# Patient Record
Sex: MALE | Race: WHITE | NOT HISPANIC OR LATINO | Employment: OTHER | URBAN - METROPOLITAN AREA
[De-identification: names, ages, dates, MRNs, and addresses within clinical notes are randomized per-mention and may not be internally consistent; named-entity substitution may affect disease eponyms.]

---

## 2017-01-10 RX ORDER — FLUTICASONE PROPIONATE 50 MCG
SPRAY, SUSPENSION (ML) NASAL
Qty: 16 G | Refills: 3 | Status: SHIPPED | OUTPATIENT
Start: 2017-01-10

## 2017-03-09 RX ORDER — BENAZEPRIL HYDROCHLORIDE 20 MG/1
TABLET ORAL
Qty: 30 TABLET | Refills: 0 | OUTPATIENT
Start: 2017-03-09

## 2017-03-14 RX ORDER — BENAZEPRIL HYDROCHLORIDE 20 MG/1
TABLET ORAL
Qty: 30 TABLET | Refills: 0 | OUTPATIENT
Start: 2017-03-14

## 2020-11-03 ENCOUNTER — OFFICE VISIT (OUTPATIENT)
Dept: SURGERY | Facility: CLINIC | Age: 75
End: 2020-11-03
Payer: COMMERCIAL

## 2020-11-03 VITALS
TEMPERATURE: 97 F | WEIGHT: 219.38 LBS | HEIGHT: 69 IN | HEART RATE: 73 BPM | SYSTOLIC BLOOD PRESSURE: 132 MMHG | BODY MASS INDEX: 32.49 KG/M2 | DIASTOLIC BLOOD PRESSURE: 79 MMHG

## 2020-11-03 DIAGNOSIS — K64.1 GRADE II HEMORRHOIDS: Primary | ICD-10-CM

## 2020-11-03 PROCEDURE — 1126F AMNT PAIN NOTED NONE PRSNT: CPT | Mod: S$GLB,,, | Performed by: SURGERY

## 2020-11-03 PROCEDURE — 3075F SYST BP GE 130 - 139MM HG: CPT | Mod: CPTII,S$GLB,, | Performed by: SURGERY

## 2020-11-03 PROCEDURE — 3078F PR MOST RECENT DIASTOLIC BLOOD PRESSURE < 80 MM HG: ICD-10-PCS | Mod: CPTII,S$GLB,, | Performed by: SURGERY

## 2020-11-03 PROCEDURE — 3008F BODY MASS INDEX DOCD: CPT | Mod: CPTII,S$GLB,, | Performed by: SURGERY

## 2020-11-03 PROCEDURE — 46600 DIAGNOSTIC ANOSCOPY SPX: CPT | Mod: S$GLB,,, | Performed by: SURGERY

## 2020-11-03 PROCEDURE — 1101F PT FALLS ASSESS-DOCD LE1/YR: CPT | Mod: CPTII,S$GLB,, | Performed by: SURGERY

## 2020-11-03 PROCEDURE — 1159F MED LIST DOCD IN RCRD: CPT | Mod: S$GLB,,, | Performed by: SURGERY

## 2020-11-03 PROCEDURE — 99999 PR PBB SHADOW E&M-NEW PATIENT-LVL III: CPT | Mod: PBBFAC,,, | Performed by: SURGERY

## 2020-11-03 PROCEDURE — 99203 PR OFFICE/OUTPT VISIT, NEW, LEVL III, 30-44 MIN: ICD-10-PCS | Mod: 25,S$GLB,, | Performed by: SURGERY

## 2020-11-03 PROCEDURE — 1126F PR PAIN SEVERITY QUANTIFIED, NO PAIN PRESENT: ICD-10-PCS | Mod: S$GLB,,, | Performed by: SURGERY

## 2020-11-03 PROCEDURE — 99203 OFFICE O/P NEW LOW 30 MIN: CPT | Mod: 25,S$GLB,, | Performed by: SURGERY

## 2020-11-03 PROCEDURE — 3078F DIAST BP <80 MM HG: CPT | Mod: CPTII,S$GLB,, | Performed by: SURGERY

## 2020-11-03 PROCEDURE — 99999 PR PBB SHADOW E&M-NEW PATIENT-LVL III: ICD-10-PCS | Mod: PBBFAC,,, | Performed by: SURGERY

## 2020-11-03 PROCEDURE — 1159F PR MEDICATION LIST DOCUMENTED IN MEDICAL RECORD: ICD-10-PCS | Mod: S$GLB,,, | Performed by: SURGERY

## 2020-11-03 PROCEDURE — 46600 PR DIAG2STIC A2SCOPY: ICD-10-PCS | Mod: S$GLB,,, | Performed by: SURGERY

## 2020-11-03 PROCEDURE — 3008F PR BODY MASS INDEX (BMI) DOCUMENTED: ICD-10-PCS | Mod: CPTII,S$GLB,, | Performed by: SURGERY

## 2020-11-03 PROCEDURE — 3075F PR MOST RECENT SYSTOLIC BLOOD PRESS GE 130-139MM HG: ICD-10-PCS | Mod: CPTII,S$GLB,, | Performed by: SURGERY

## 2020-11-03 PROCEDURE — 1101F PR PT FALLS ASSESS DOC 0-1 FALLS W/OUT INJ PAST YR: ICD-10-PCS | Mod: CPTII,S$GLB,, | Performed by: SURGERY

## 2020-11-03 RX ORDER — A/SINGAPORE/GP1908/2015 IVR-180 (AN A/MICHIGAN/45/2015 (H1N1)PDM09-LIKE VIRUS, A/HONG KONG/4801/2014, NYMC X-263B (H3N2) (AN A/HONG KONG/4801/2014-LIKE VIRUS), AND B/BRISBANE/60/2008, WILD TYPE (A B/BRISBANE/60/2008-LIKE VIRUS) 15; 15; 15 UG/.5ML; UG/.5ML; UG/.5ML
INJECTION, SUSPENSION INTRAMUSCULAR
COMMUNITY
Start: 2020-10-08 | End: 2021-01-01

## 2020-11-03 RX ORDER — AMLODIPINE AND BENAZEPRIL HYDROCHLORIDE 5; 40 MG/1; MG/1
1 CAPSULE ORAL DAILY
Status: ON HOLD | COMMUNITY
End: 2022-01-01 | Stop reason: HOSPADM

## 2020-11-03 RX ORDER — TESTOSTERONE CYPIONATE 200 MG/ML
200 INJECTION, SOLUTION INTRAMUSCULAR
COMMUNITY
End: 2021-01-01

## 2020-11-03 RX ORDER — HYDROCORTISONE ACETATE 25 MG/1
25 SUPPOSITORY RECTAL 2 TIMES DAILY PRN
Status: ON HOLD | COMMUNITY
Start: 2020-10-21 | End: 2022-01-01 | Stop reason: HOSPADM

## 2020-11-03 RX ORDER — MULTIVIT WITH IRON,MINERALS
1 TABLET,CHEWABLE ORAL DAILY
Status: ON HOLD | COMMUNITY
End: 2022-01-01 | Stop reason: HOSPADM

## 2020-11-03 NOTE — PROGRESS NOTES
Subjective:       Patient ID: Kalen Evans is a 75 y.o. male.    Chief Complaint: Consult (Hemorrhoids)    HPI   this is a pleasant 75 male who was referred to me for evaluation of his hemorrhoids.  Patient notes he has been having difficulty with his hemorrhoids.  He noted that he had a swollen mass internally that was prolapsing her falling out.  He stated this made bowel movements difficulty.  He states over the last 4-5 days however this has improved.  He notes he can hardly notice that anymore.  He denies any bleeding.  He denies any changes in bowel habits.  He does report he had a colonoscopy in the last 2-3 years.  He has no other complaints.  He does take fiber daily.  His past medical history significant for coronary artery disease.  He has had multiple abdominal surgeries.  Review of Systems   Constitutional: Negative for activity change, appetite change, diaphoresis, fever and unexpected weight change.   Respiratory: Negative for cough, chest tightness and wheezing.    Cardiovascular: Negative for chest pain and palpitations.   Gastrointestinal: Negative for abdominal distention, abdominal pain, anal bleeding, blood in stool, constipation, diarrhea, nausea, rectal pain and vomiting.   Genitourinary: Negative for difficulty urinating, dysuria and hematuria.   Musculoskeletal: Negative for back pain and gait problem.   Neurological: Negative for tremors and seizures.         Objective:      Physical Exam  Vitals signs reviewed.   Constitutional:       Appearance: He is well-developed.   HENT:      Head: Normocephalic and atraumatic.   Eyes:      Pupils: Pupils are equal, round, and reactive to light.   Neck:      Musculoskeletal: Normal range of motion.      Thyroid: No thyromegaly.      Trachea: No tracheal deviation.   Cardiovascular:      Rate and Rhythm: Normal rate and regular rhythm.      Heart sounds: Normal heart sounds. No murmur. No friction rub. No gallop.    Pulmonary:      Effort: Pulmonary  effort is normal.   Abdominal:      General: There is no distension.      Palpations: There is no mass.      Tenderness: There is no abdominal tenderness. There is no guarding or rebound.   Genitourinary:     Comments: External exam does demonstrate a hemorrhoidal tag in the left anterior lateral position.  Digital rectal exam with normal sphincter tone.  Anoscopy was done demonstrating slight to moderate internal hemorrhoids in the right anterior, right posterior and left lateral columns.  Musculoskeletal: Normal range of motion.   Skin:     General: Skin is warm.   Neurological:      Mental Status: He is alert and oriented to person, place, and time.      Coordination: Coordination normal.         Assessment:     hemorrhoids  No diagnosis found.    Plan:       D/w pt.  I have recommended increasing fiber in male diet and to also begin using a fiber supplement (metamucil or psyillium husk).  Will monitor for improvement and if no significant improvement will consider more aggressive treatment.

## 2021-01-01 ENCOUNTER — HOSPITAL ENCOUNTER (OUTPATIENT)
Dept: RADIOLOGY | Facility: HOSPITAL | Age: 76
Discharge: HOME | End: 2021-11-11
Attending: INTERNAL MEDICINE
Payer: MEDICARE

## 2021-01-01 ENCOUNTER — SOCIAL WORK (OUTPATIENT)
Dept: HEMATOLOGY/ONCOLOGY | Facility: CLINIC | Age: 76
End: 2021-01-01

## 2021-01-01 ENCOUNTER — HOSPITAL ENCOUNTER (OUTPATIENT)
Dept: RADIOLOGY | Facility: HOSPITAL | Age: 76
Discharge: HOME | End: 2021-09-23
Attending: INTERNAL MEDICINE
Payer: MEDICARE

## 2021-01-01 ENCOUNTER — INFUSION (OUTPATIENT)
Dept: INFUSION THERAPY | Facility: HOSPITAL | Age: 76
End: 2021-01-01
Attending: INTERNAL MEDICINE
Payer: MEDICARE

## 2021-01-01 ENCOUNTER — TELEPHONE (OUTPATIENT)
Dept: HEMATOLOGY/ONCOLOGY | Facility: CLINIC | Age: 76
End: 2021-01-01

## 2021-01-01 ENCOUNTER — HOSPITAL ENCOUNTER (OUTPATIENT)
Dept: RADIOLOGY | Facility: HOSPITAL | Age: 76
Discharge: HOME | End: 2021-09-21
Attending: INTERNAL MEDICINE
Payer: MEDICARE

## 2021-01-01 ENCOUNTER — TELEPHONE (OUTPATIENT)
Dept: HEMATOLOGY/ONCOLOGY | Facility: HOSPITAL | Age: 76
End: 2021-01-01
Payer: MEDICARE

## 2021-01-01 ENCOUNTER — OFFICE VISIT (OUTPATIENT)
Dept: HEMATOLOGY/ONCOLOGY | Facility: CLINIC | Age: 76
End: 2021-01-01
Payer: MEDICARE

## 2021-01-01 ENCOUNTER — TELEPHONE (OUTPATIENT)
Dept: HEMATOLOGY/ONCOLOGY | Facility: CLINIC | Age: 76
End: 2021-01-01
Payer: MEDICARE

## 2021-01-01 ENCOUNTER — OFFICE VISIT (OUTPATIENT)
Dept: RADIATION ONCOLOGY | Facility: CLINIC | Age: 76
End: 2021-01-01
Payer: MEDICARE

## 2021-01-01 ENCOUNTER — HOSPITAL ENCOUNTER (OUTPATIENT)
Dept: RADIOLOGY | Facility: HOSPITAL | Age: 76
Discharge: HOME | End: 2021-12-29
Attending: INTERNAL MEDICINE
Payer: MEDICARE

## 2021-01-01 ENCOUNTER — HOSPITAL ENCOUNTER (OUTPATIENT)
Dept: RADIOLOGY | Facility: HOSPITAL | Age: 76
Discharge: HOME | End: 2021-09-10
Attending: INTERNAL MEDICINE
Payer: MEDICARE

## 2021-01-01 ENCOUNTER — LAB VISIT (OUTPATIENT)
Dept: LAB | Facility: HOSPITAL | Age: 76
End: 2021-01-01
Attending: INTERNAL MEDICINE
Payer: MEDICARE

## 2021-01-01 ENCOUNTER — TELEPHONE (OUTPATIENT)
Dept: PULMONOLOGY | Facility: CLINIC | Age: 76
End: 2021-01-01

## 2021-01-01 ENCOUNTER — DOCUMENTATION ONLY (OUTPATIENT)
Dept: HEMATOLOGY/ONCOLOGY | Facility: CLINIC | Age: 76
End: 2021-01-01

## 2021-01-01 ENCOUNTER — TELEPHONE (OUTPATIENT)
Dept: INFUSION THERAPY | Facility: HOSPITAL | Age: 76
End: 2021-01-01

## 2021-01-01 ENCOUNTER — HOSPITAL ENCOUNTER (OUTPATIENT)
Dept: RADIOLOGY | Facility: HOSPITAL | Age: 76
Discharge: HOME | End: 2021-12-01
Attending: INTERNAL MEDICINE
Payer: MEDICARE

## 2021-01-01 ENCOUNTER — TELEPHONE (OUTPATIENT)
Dept: RADIOLOGY | Facility: HOSPITAL | Age: 76
End: 2021-01-01

## 2021-01-01 VITALS
HEIGHT: 69 IN | DIASTOLIC BLOOD PRESSURE: 54 MMHG | RESPIRATION RATE: 16 BRPM | HEART RATE: 57 BPM | BODY MASS INDEX: 28.57 KG/M2 | BODY MASS INDEX: 29.31 KG/M2 | RESPIRATION RATE: 16 BRPM | WEIGHT: 191.13 LBS | SYSTOLIC BLOOD PRESSURE: 113 MMHG | WEIGHT: 192.88 LBS | TEMPERATURE: 98 F | SYSTOLIC BLOOD PRESSURE: 117 MMHG | HEIGHT: 69 IN | BODY MASS INDEX: 28.31 KG/M2 | WEIGHT: 197.88 LBS | DIASTOLIC BLOOD PRESSURE: 65 MMHG | DIASTOLIC BLOOD PRESSURE: 62 MMHG | TEMPERATURE: 97 F | HEART RATE: 83 BPM | HEART RATE: 59 BPM | RESPIRATION RATE: 16 BRPM | HEIGHT: 69 IN | TEMPERATURE: 98 F | SYSTOLIC BLOOD PRESSURE: 138 MMHG | OXYGEN SATURATION: 97 %

## 2021-01-01 VITALS
OXYGEN SATURATION: 95 % | SYSTOLIC BLOOD PRESSURE: 133 MMHG | TEMPERATURE: 97 F | DIASTOLIC BLOOD PRESSURE: 63 MMHG | WEIGHT: 194.81 LBS | HEART RATE: 73 BPM | BODY MASS INDEX: 28.77 KG/M2 | RESPIRATION RATE: 18 BRPM

## 2021-01-01 VITALS
WEIGHT: 189.69 LBS | TEMPERATURE: 97 F | HEIGHT: 69 IN | HEART RATE: 62 BPM | BODY MASS INDEX: 28.09 KG/M2 | SYSTOLIC BLOOD PRESSURE: 129 MMHG | DIASTOLIC BLOOD PRESSURE: 72 MMHG | RESPIRATION RATE: 18 BRPM

## 2021-01-01 VITALS
BODY MASS INDEX: 27.91 KG/M2 | SYSTOLIC BLOOD PRESSURE: 132 MMHG | DIASTOLIC BLOOD PRESSURE: 68 MMHG | TEMPERATURE: 98 F | RESPIRATION RATE: 18 BRPM | WEIGHT: 189 LBS | OXYGEN SATURATION: 95 % | HEART RATE: 85 BPM

## 2021-01-01 VITALS
TEMPERATURE: 98 F | HEART RATE: 57 BPM | DIASTOLIC BLOOD PRESSURE: 57 MMHG | DIASTOLIC BLOOD PRESSURE: 63 MMHG | HEART RATE: 70 BPM | BODY MASS INDEX: 28.16 KG/M2 | TEMPERATURE: 97 F | WEIGHT: 190.13 LBS | OXYGEN SATURATION: 95 % | BODY MASS INDEX: 28.51 KG/M2 | RESPIRATION RATE: 18 BRPM | WEIGHT: 192.5 LBS | SYSTOLIC BLOOD PRESSURE: 104 MMHG | SYSTOLIC BLOOD PRESSURE: 113 MMHG | RESPIRATION RATE: 18 BRPM | OXYGEN SATURATION: 96 % | HEIGHT: 69 IN | HEIGHT: 69 IN

## 2021-01-01 VITALS
HEIGHT: 69 IN | HEART RATE: 85 BPM | DIASTOLIC BLOOD PRESSURE: 78 MMHG | BODY MASS INDEX: 27.4 KG/M2 | WEIGHT: 185 LBS | RESPIRATION RATE: 18 BRPM | SYSTOLIC BLOOD PRESSURE: 145 MMHG

## 2021-01-01 VITALS
DIASTOLIC BLOOD PRESSURE: 79 MMHG | BODY MASS INDEX: 28.21 KG/M2 | WEIGHT: 191 LBS | SYSTOLIC BLOOD PRESSURE: 158 MMHG | OXYGEN SATURATION: 95 % | RESPIRATION RATE: 18 BRPM | HEART RATE: 69 BPM | HEIGHT: 69 IN | DIASTOLIC BLOOD PRESSURE: 82 MMHG | TEMPERATURE: 98 F | SYSTOLIC BLOOD PRESSURE: 147 MMHG | HEART RATE: 64 BPM | WEIGHT: 191 LBS | BODY MASS INDEX: 28.29 KG/M2

## 2021-01-01 VITALS
RESPIRATION RATE: 18 BRPM | HEIGHT: 69 IN | HEART RATE: 86 BPM | OXYGEN SATURATION: 97 % | BODY MASS INDEX: 28.29 KG/M2 | DIASTOLIC BLOOD PRESSURE: 66 MMHG | WEIGHT: 191 LBS | TEMPERATURE: 98 F | SYSTOLIC BLOOD PRESSURE: 128 MMHG

## 2021-01-01 VITALS
RESPIRATION RATE: 16 BRPM | WEIGHT: 196.19 LBS | TEMPERATURE: 98 F | DIASTOLIC BLOOD PRESSURE: 71 MMHG | BODY MASS INDEX: 29.06 KG/M2 | HEART RATE: 64 BPM | HEIGHT: 69 IN | SYSTOLIC BLOOD PRESSURE: 149 MMHG | OXYGEN SATURATION: 97 %

## 2021-01-01 VITALS
RESPIRATION RATE: 18 BRPM | SYSTOLIC BLOOD PRESSURE: 142 MMHG | HEIGHT: 69 IN | BODY MASS INDEX: 28.44 KG/M2 | OXYGEN SATURATION: 96 % | HEIGHT: 69 IN | HEART RATE: 71 BPM | HEART RATE: 62 BPM | SYSTOLIC BLOOD PRESSURE: 132 MMHG | WEIGHT: 191.13 LBS | HEIGHT: 69 IN | DIASTOLIC BLOOD PRESSURE: 62 MMHG | BODY MASS INDEX: 28.31 KG/M2 | BODY MASS INDEX: 28.88 KG/M2 | RESPIRATION RATE: 16 BRPM | TEMPERATURE: 97 F | WEIGHT: 192 LBS | DIASTOLIC BLOOD PRESSURE: 75 MMHG | WEIGHT: 195 LBS

## 2021-01-01 VITALS
BODY MASS INDEX: 27.85 KG/M2 | SYSTOLIC BLOOD PRESSURE: 119 MMHG | OXYGEN SATURATION: 95 % | HEIGHT: 69 IN | HEART RATE: 70 BPM | RESPIRATION RATE: 18 BRPM | WEIGHT: 186.81 LBS | SYSTOLIC BLOOD PRESSURE: 126 MMHG | DIASTOLIC BLOOD PRESSURE: 72 MMHG | HEIGHT: 69 IN | RESPIRATION RATE: 17 BRPM | TEMPERATURE: 98 F | HEART RATE: 61 BPM | WEIGHT: 188 LBS | DIASTOLIC BLOOD PRESSURE: 78 MMHG | BODY MASS INDEX: 27.67 KG/M2

## 2021-01-01 VITALS
BODY MASS INDEX: 28.01 KG/M2 | TEMPERATURE: 97 F | RESPIRATION RATE: 18 BRPM | DIASTOLIC BLOOD PRESSURE: 78 MMHG | WEIGHT: 189.13 LBS | HEART RATE: 68 BPM | SYSTOLIC BLOOD PRESSURE: 148 MMHG | HEIGHT: 69 IN | OXYGEN SATURATION: 95 %

## 2021-01-01 VITALS
DIASTOLIC BLOOD PRESSURE: 77 MMHG | RESPIRATION RATE: 18 BRPM | SYSTOLIC BLOOD PRESSURE: 151 MMHG | BODY MASS INDEX: 29.33 KG/M2 | HEART RATE: 75 BPM | WEIGHT: 198 LBS | HEIGHT: 69 IN

## 2021-01-01 VITALS
DIASTOLIC BLOOD PRESSURE: 80 MMHG | WEIGHT: 189 LBS | SYSTOLIC BLOOD PRESSURE: 155 MMHG | HEART RATE: 67 BPM | BODY MASS INDEX: 27.91 KG/M2

## 2021-01-01 VITALS
WEIGHT: 193.63 LBS | SYSTOLIC BLOOD PRESSURE: 149 MMHG | TEMPERATURE: 98 F | RESPIRATION RATE: 17 BRPM | BODY MASS INDEX: 28.59 KG/M2 | HEART RATE: 72 BPM | DIASTOLIC BLOOD PRESSURE: 78 MMHG

## 2021-01-01 VITALS
BODY MASS INDEX: 27.91 KG/M2 | HEART RATE: 80 BPM | DIASTOLIC BLOOD PRESSURE: 69 MMHG | WEIGHT: 189 LBS | TEMPERATURE: 98 F | SYSTOLIC BLOOD PRESSURE: 133 MMHG

## 2021-01-01 VITALS
HEIGHT: 69 IN | DIASTOLIC BLOOD PRESSURE: 73 MMHG | BODY MASS INDEX: 27.8 KG/M2 | TEMPERATURE: 98 F | SYSTOLIC BLOOD PRESSURE: 127 MMHG | RESPIRATION RATE: 16 BRPM | RESPIRATION RATE: 16 BRPM | HEIGHT: 69 IN | DIASTOLIC BLOOD PRESSURE: 64 MMHG | HEART RATE: 78 BPM | WEIGHT: 194.13 LBS | WEIGHT: 187.69 LBS | TEMPERATURE: 98 F | BODY MASS INDEX: 28.75 KG/M2 | HEART RATE: 75 BPM | SYSTOLIC BLOOD PRESSURE: 143 MMHG

## 2021-01-01 VITALS
WEIGHT: 187 LBS | TEMPERATURE: 99 F | HEART RATE: 66 BPM | BODY MASS INDEX: 27.62 KG/M2 | DIASTOLIC BLOOD PRESSURE: 81 MMHG | SYSTOLIC BLOOD PRESSURE: 151 MMHG

## 2021-01-01 VITALS
RESPIRATION RATE: 18 BRPM | DIASTOLIC BLOOD PRESSURE: 84 MMHG | WEIGHT: 197 LBS | SYSTOLIC BLOOD PRESSURE: 179 MMHG | HEART RATE: 69 BPM | BODY MASS INDEX: 29.18 KG/M2 | HEIGHT: 69 IN

## 2021-01-01 VITALS
TEMPERATURE: 98 F | WEIGHT: 186 LBS | HEART RATE: 78 BPM | RESPIRATION RATE: 18 BRPM | HEIGHT: 69 IN | OXYGEN SATURATION: 95 % | DIASTOLIC BLOOD PRESSURE: 67 MMHG | BODY MASS INDEX: 27.55 KG/M2 | SYSTOLIC BLOOD PRESSURE: 144 MMHG

## 2021-01-01 DIAGNOSIS — T45.1X5A CHEMOTHERAPY INDUCED NEUTROPENIA: ICD-10-CM

## 2021-01-01 DIAGNOSIS — C7A.8 NEUROENDOCRINE CARCINOMA OF LUNG: ICD-10-CM

## 2021-01-01 DIAGNOSIS — C34.90 PRIMARY MALIGNANT NEOPLASM OF LUNG, UNSPECIFIED LATERALITY: Primary | ICD-10-CM

## 2021-01-01 DIAGNOSIS — Z03.818 ENCOUNTER FOR OBSERVATION FOR SUSPECTED EXPOSURE TO OTHER BIOLOGICAL AGENTS RULED OUT: Primary | ICD-10-CM

## 2021-01-01 DIAGNOSIS — C34.90 PRIMARY MALIGNANT NEOPLASM OF LUNG, UNSPECIFIED LATERALITY: ICD-10-CM

## 2021-01-01 DIAGNOSIS — C7A.8 NEUROENDOCRINE CARCINOMA OF LUNG: Primary | ICD-10-CM

## 2021-01-01 DIAGNOSIS — C38.1 MALIGNANT NEOPLASM OF ANTERIOR MEDIASTINUM: ICD-10-CM

## 2021-01-01 DIAGNOSIS — I10 ESSENTIAL HYPERTENSION: ICD-10-CM

## 2021-01-01 DIAGNOSIS — J98.59 MEDIASTINAL MASS: Primary | ICD-10-CM

## 2021-01-01 DIAGNOSIS — E03.2 HYPOTHYROIDISM DUE TO MEDICATION: Primary | ICD-10-CM

## 2021-01-01 DIAGNOSIS — D70.1 CHEMOTHERAPY INDUCED NEUTROPENIA: ICD-10-CM

## 2021-01-01 DIAGNOSIS — K86.3 PSEUDOCYST OF PANCREAS: ICD-10-CM

## 2021-01-01 DIAGNOSIS — F17.200 CURRENT EVERY DAY SMOKER: ICD-10-CM

## 2021-01-01 DIAGNOSIS — C7A.8 NEUROENDOCRINE CARCINOMA METASTATIC TO LUNG: Primary | ICD-10-CM

## 2021-01-01 DIAGNOSIS — Z95.1 S/P CABG X 4: ICD-10-CM

## 2021-01-01 DIAGNOSIS — R91.8 PULMONARY NODULES/LESIONS, MULTIPLE: ICD-10-CM

## 2021-01-01 DIAGNOSIS — C7B.8 NEUROENDOCRINE CARCINOMA METASTATIC TO LUNG: Primary | ICD-10-CM

## 2021-01-01 DIAGNOSIS — R91.1 PULMONARY NODULE: Primary | ICD-10-CM

## 2021-01-01 DIAGNOSIS — R91.1 PULMONARY NODULE: ICD-10-CM

## 2021-01-01 DIAGNOSIS — D70.1 CHEMOTHERAPY INDUCED NEUTROPENIA: Primary | ICD-10-CM

## 2021-01-01 DIAGNOSIS — C7B.8 NEUROENDOCRINE CARCINOMA METASTATIC TO LUNG: ICD-10-CM

## 2021-01-01 DIAGNOSIS — E03.2 HYPOTHYROIDISM DUE TO MEDICATION: ICD-10-CM

## 2021-01-01 DIAGNOSIS — R19.7 DIARRHEA DUE TO MALABSORPTION: ICD-10-CM

## 2021-01-01 DIAGNOSIS — J98.59 MEDIASTINAL MASS: ICD-10-CM

## 2021-01-01 DIAGNOSIS — K90.9 DIARRHEA DUE TO MALABSORPTION: ICD-10-CM

## 2021-01-01 DIAGNOSIS — T45.1X5A CHEMOTHERAPY INDUCED NEUTROPENIA: Primary | ICD-10-CM

## 2021-01-01 DIAGNOSIS — C7A.8 NEUROENDOCRINE CARCINOMA METASTATIC TO LUNG: ICD-10-CM

## 2021-01-01 DIAGNOSIS — K86.89 SECONDARY PANCREATIC INSUFFICIENCY: ICD-10-CM

## 2021-01-01 LAB
ALBUMIN SERPL BCP-MCNC: 3.2 G/DL (ref 3.5–5.2)
ALBUMIN SERPL BCP-MCNC: 3.4 G/DL (ref 3.5–5.2)
ALBUMIN SERPL BCP-MCNC: 3.5 G/DL (ref 3.5–5.2)
ALBUMIN SERPL BCP-MCNC: 3.6 G/DL (ref 3.5–5.2)
ALBUMIN SERPL BCP-MCNC: 3.7 G/DL (ref 3.5–5.2)
ALBUMIN SERPL BCP-MCNC: 3.7 G/DL (ref 3.5–5.2)
ALP SERPL-CCNC: 108 U/L (ref 55–135)
ALP SERPL-CCNC: 120 U/L (ref 55–135)
ALP SERPL-CCNC: 153 U/L (ref 55–135)
ALP SERPL-CCNC: 74 U/L (ref 55–135)
ALP SERPL-CCNC: 74 U/L (ref 55–135)
ALP SERPL-CCNC: 95 U/L (ref 55–135)
ALT SERPL W/O P-5'-P-CCNC: 13 U/L (ref 10–44)
ALT SERPL W/O P-5'-P-CCNC: 16 U/L (ref 10–44)
ALT SERPL W/O P-5'-P-CCNC: 17 U/L (ref 10–44)
ALT SERPL W/O P-5'-P-CCNC: 18 U/L (ref 10–44)
ANION GAP SERPL CALC-SCNC: 11 MMOL/L (ref 8–16)
ANION GAP SERPL CALC-SCNC: 12 MMOL/L (ref 8–16)
ANION GAP SERPL CALC-SCNC: 13 MMOL/L (ref 8–16)
ANION GAP SERPL CALC-SCNC: 8 MMOL/L (ref 8–16)
ANION GAP SERPL CALC-SCNC: 8 MMOL/L (ref 8–16)
ANION GAP SERPL CALC-SCNC: 9 MMOL/L (ref 8–16)
ANISOCYTOSIS BLD QL SMEAR: SLIGHT
APTT PPP: 33.7 SEC (ref 25.6–35.8)
AST SERPL-CCNC: 16 U/L (ref 10–40)
AST SERPL-CCNC: 19 U/L (ref 10–40)
AST SERPL-CCNC: 21 U/L (ref 10–40)
AST SERPL-CCNC: 22 U/L (ref 10–40)
AST SERPL-CCNC: 28 U/L (ref 10–40)
AST SERPL-CCNC: 28 U/L (ref 10–40)
BASOPHILS # BLD AUTO: 0.03 K/UL (ref 0–0.2)
BASOPHILS # BLD AUTO: 0.04 K/UL (ref 0–0.2)
BASOPHILS # BLD AUTO: 0.06 K/UL (ref 0–0.2)
BASOPHILS # BLD AUTO: 0.07 K/UL (ref 0–0.2)
BASOPHILS # BLD AUTO: 0.07 K/UL (ref 0–0.2)
BASOPHILS NFR BLD: 0 % (ref 0–1.9)
BASOPHILS NFR BLD: 0 % (ref 0–1.9)
BASOPHILS NFR BLD: 0.3 % (ref 0–1.9)
BASOPHILS NFR BLD: 0.4 % (ref 0–1.9)
BASOPHILS NFR BLD: 0.7 % (ref 0–1.9)
BASOPHILS NFR BLD: 0.7 % (ref 0–1.9)
BASOPHILS NFR BLD: 1 % (ref 0–1.9)
BILIRUB SERPL-MCNC: 0.6 MG/DL (ref 0.1–1)
BILIRUB SERPL-MCNC: 0.8 MG/DL (ref 0.1–1)
BILIRUB SERPL-MCNC: 0.9 MG/DL (ref 0.1–1)
BILIRUB SERPL-MCNC: 1 MG/DL (ref 0.1–1)
BUN SERPL-MCNC: 13 MG/DL (ref 8–23)
BUN SERPL-MCNC: 15 MG/DL (ref 8–23)
BUN SERPL-MCNC: 17 MG/DL (ref 8–23)
BUN SERPL-MCNC: 8 MG/DL (ref 8–23)
CALCIUM SERPL-MCNC: 9 MG/DL (ref 8.7–10.5)
CALCIUM SERPL-MCNC: 9.3 MG/DL (ref 8.7–10.5)
CALCIUM SERPL-MCNC: 9.4 MG/DL (ref 8.7–10.5)
CALCIUM SERPL-MCNC: 9.4 MG/DL (ref 8.7–10.5)
CALCIUM SERPL-MCNC: 9.9 MG/DL (ref 8.7–10.5)
CALCIUM SERPL-MCNC: 9.9 MG/DL (ref 8.7–10.5)
CHLORIDE SERPL-SCNC: 101 MMOL/L (ref 95–110)
CHLORIDE SERPL-SCNC: 102 MMOL/L (ref 95–110)
CHLORIDE SERPL-SCNC: 102 MMOL/L (ref 95–110)
CHLORIDE SERPL-SCNC: 98 MMOL/L (ref 95–110)
CO2 SERPL-SCNC: 22 MMOL/L (ref 23–29)
CO2 SERPL-SCNC: 25 MMOL/L (ref 23–29)
CREAT SERPL-MCNC: 0.7 MG/DL (ref 0.5–1.4)
CREAT SERPL-MCNC: 0.7 MG/DL (ref 0.5–1.4)
CREAT SERPL-MCNC: 0.8 MG/DL (ref 0.5–1.4)
CREAT SERPL-MCNC: 0.9 MG/DL (ref 0.5–1.4)
DIFFERENTIAL METHOD: ABNORMAL
EOSINOPHIL # BLD AUTO: 0 K/UL (ref 0–0.5)
EOSINOPHIL # BLD AUTO: 0 K/UL (ref 0–0.5)
EOSINOPHIL # BLD AUTO: 0.1 K/UL (ref 0–0.5)
EOSINOPHIL # BLD AUTO: 0.1 K/UL (ref 0–0.5)
EOSINOPHIL # BLD AUTO: 0.2 K/UL (ref 0–0.5)
EOSINOPHIL NFR BLD: 0 % (ref 0–8)
EOSINOPHIL NFR BLD: 0 % (ref 0–8)
EOSINOPHIL NFR BLD: 0.2 % (ref 0–8)
EOSINOPHIL NFR BLD: 0.3 % (ref 0–8)
EOSINOPHIL NFR BLD: 1.3 % (ref 0–8)
EOSINOPHIL NFR BLD: 1.3 % (ref 0–8)
EOSINOPHIL NFR BLD: 2.4 % (ref 0–8)
ERYTHROCYTE [DISTWIDTH] IN BLOOD BY AUTOMATED COUNT: 13.6 % (ref 11.5–14.5)
ERYTHROCYTE [DISTWIDTH] IN BLOOD BY AUTOMATED COUNT: 14.7 % (ref 11.5–14.5)
ERYTHROCYTE [DISTWIDTH] IN BLOOD BY AUTOMATED COUNT: 16.6 % (ref 11.5–14.5)
ERYTHROCYTE [DISTWIDTH] IN BLOOD BY AUTOMATED COUNT: 16.6 % (ref 11.5–14.5)
ERYTHROCYTE [DISTWIDTH] IN BLOOD BY AUTOMATED COUNT: 16.9 % (ref 11.5–14.5)
ERYTHROCYTE [DISTWIDTH] IN BLOOD BY AUTOMATED COUNT: 17.4 % (ref 11.5–14.5)
ERYTHROCYTE [DISTWIDTH] IN BLOOD BY AUTOMATED COUNT: 18.7 % (ref 11.5–14.5)
EST. GFR  (AFRICAN AMERICAN): >60 ML/MIN/1.73 M^2
EST. GFR  (NON AFRICAN AMERICAN): >60 ML/MIN/1.73 M^2
GLUCOSE SERPL-MCNC: 102 MG/DL (ref 70–110)
GLUCOSE SERPL-MCNC: 103 MG/DL (ref 70–110)
GLUCOSE SERPL-MCNC: 107 MG/DL (ref 70–110)
GLUCOSE SERPL-MCNC: 120 MG/DL (ref 70–110)
GLUCOSE SERPL-MCNC: 141 MG/DL (ref 70–110)
HCT VFR BLD AUTO: 29.3 % (ref 40–54)
HCT VFR BLD AUTO: 31.2 % (ref 40–54)
HCT VFR BLD AUTO: 32.4 % (ref 40–54)
HCT VFR BLD AUTO: 33.8 % (ref 40–54)
HCT VFR BLD AUTO: 35.7 % (ref 40–54)
HCT VFR BLD AUTO: 35.7 % (ref 40–54)
HCT VFR BLD AUTO: 40 % (ref 40–54)
HGB BLD-MCNC: 10.6 G/DL (ref 14–18)
HGB BLD-MCNC: 11.2 G/DL (ref 14–18)
HGB BLD-MCNC: 11.5 G/DL (ref 14–18)
HGB BLD-MCNC: 12 G/DL (ref 14–18)
HGB BLD-MCNC: 12 G/DL (ref 14–18)
HGB BLD-MCNC: 13.7 G/DL (ref 14–18)
HGB BLD-MCNC: 9.7 G/DL (ref 14–18)
IMM GRANULOCYTES # BLD AUTO: 0.03 K/UL (ref 0–0.04)
IMM GRANULOCYTES # BLD AUTO: 0.04 K/UL (ref 0–0.04)
IMM GRANULOCYTES # BLD AUTO: 0.09 K/UL (ref 0–0.04)
IMM GRANULOCYTES # BLD AUTO: ABNORMAL K/UL (ref 0–0.04)
IMM GRANULOCYTES # BLD AUTO: ABNORMAL K/UL (ref 0–0.04)
IMM GRANULOCYTES NFR BLD AUTO: 0.3 % (ref 0–0.5)
IMM GRANULOCYTES NFR BLD AUTO: 0.3 % (ref 0–0.5)
IMM GRANULOCYTES NFR BLD AUTO: 0.4 % (ref 0–0.5)
IMM GRANULOCYTES NFR BLD AUTO: 0.7 % (ref 0–0.5)
IMM GRANULOCYTES NFR BLD AUTO: 0.7 % (ref 0–0.5)
IMM GRANULOCYTES NFR BLD AUTO: ABNORMAL % (ref 0–0.5)
IMM GRANULOCYTES NFR BLD AUTO: ABNORMAL % (ref 0–0.5)
INR PPP: 1.1
LYMPHOCYTES # BLD AUTO: 0.9 K/UL (ref 1–4.8)
LYMPHOCYTES # BLD AUTO: 1.3 K/UL (ref 1–4.8)
LYMPHOCYTES # BLD AUTO: 1.3 K/UL (ref 1–4.8)
LYMPHOCYTES # BLD AUTO: 1.4 K/UL (ref 1–4.8)
LYMPHOCYTES # BLD AUTO: 1.6 K/UL (ref 1–4.8)
LYMPHOCYTES NFR BLD: 10 % (ref 18–48)
LYMPHOCYTES NFR BLD: 13.1 % (ref 18–48)
LYMPHOCYTES NFR BLD: 13.1 % (ref 18–48)
LYMPHOCYTES NFR BLD: 13.2 % (ref 18–48)
LYMPHOCYTES NFR BLD: 15.1 % (ref 18–48)
LYMPHOCYTES NFR BLD: 17.1 % (ref 18–48)
LYMPHOCYTES NFR BLD: 4 % (ref 18–48)
MAGNESIUM SERPL-MCNC: 1.8 MG/DL (ref 1.6–2.6)
MAGNESIUM SERPL-MCNC: 1.8 MG/DL (ref 1.6–2.6)
MAGNESIUM SERPL-MCNC: 1.9 MG/DL (ref 1.6–2.6)
MCH RBC QN AUTO: 31.4 PG (ref 27–31)
MCH RBC QN AUTO: 32.3 PG (ref 27–31)
MCH RBC QN AUTO: 32.4 PG (ref 27–31)
MCH RBC QN AUTO: 32.7 PG (ref 27–31)
MCH RBC QN AUTO: 33.1 PG (ref 27–31)
MCH RBC QN AUTO: 33.1 PG (ref 27–31)
MCH RBC QN AUTO: 33.2 PG (ref 27–31)
MCHC RBC AUTO-ENTMCNC: 33.1 G/DL (ref 32–36)
MCHC RBC AUTO-ENTMCNC: 33.6 G/DL (ref 32–36)
MCHC RBC AUTO-ENTMCNC: 33.6 G/DL (ref 32–36)
MCHC RBC AUTO-ENTMCNC: 34 G/DL (ref 32–36)
MCHC RBC AUTO-ENTMCNC: 34 G/DL (ref 32–36)
MCHC RBC AUTO-ENTMCNC: 34.3 G/DL (ref 32–36)
MCHC RBC AUTO-ENTMCNC: 34.6 G/DL (ref 32–36)
MCV RBC AUTO: 92 FL (ref 82–98)
MCV RBC AUTO: 95 FL (ref 82–98)
MCV RBC AUTO: 95 FL (ref 82–98)
MCV RBC AUTO: 98 FL (ref 82–98)
METAMYELOCYTES NFR BLD MANUAL: 1 %
MONOCYTES # BLD AUTO: 0.5 K/UL (ref 0.3–1)
MONOCYTES # BLD AUTO: 0.7 K/UL (ref 0.3–1)
MONOCYTES # BLD AUTO: 0.9 K/UL (ref 0.3–1)
MONOCYTES # BLD AUTO: 1.1 K/UL (ref 0.3–1)
MONOCYTES # BLD AUTO: 1.1 K/UL (ref 0.3–1)
MONOCYTES NFR BLD: 0 % (ref 4–15)
MONOCYTES NFR BLD: 11.8 % (ref 4–15)
MONOCYTES NFR BLD: 11.8 % (ref 4–15)
MONOCYTES NFR BLD: 7.5 % (ref 4–15)
MONOCYTES NFR BLD: 7.7 % (ref 4–15)
MONOCYTES NFR BLD: 7.8 % (ref 4–15)
MONOCYTES NFR BLD: 8 % (ref 4–15)
NEUTROPHILS # BLD AUTO: 4.4 K/UL (ref 1.8–7.7)
NEUTROPHILS # BLD AUTO: 6.1 K/UL (ref 1.8–7.7)
NEUTROPHILS # BLD AUTO: 6.9 K/UL (ref 1.8–7.7)
NEUTROPHILS # BLD AUTO: 6.9 K/UL (ref 1.8–7.7)
NEUTROPHILS # BLD AUTO: 9.7 K/UL (ref 1.8–7.7)
NEUTROPHILS NFR BLD: 71.9 % (ref 38–73)
NEUTROPHILS NFR BLD: 72.8 % (ref 38–73)
NEUTROPHILS NFR BLD: 72.8 % (ref 38–73)
NEUTROPHILS NFR BLD: 73 % (ref 38–73)
NEUTROPHILS NFR BLD: 75.2 % (ref 38–73)
NEUTROPHILS NFR BLD: 78.1 % (ref 38–73)
NEUTROPHILS NFR BLD: 92 % (ref 38–73)
NEUTS BAND NFR BLD MANUAL: 4 %
NEUTS BAND NFR BLD MANUAL: 8 %
NRBC BLD-RTO: 0 /100 WBC
PHOSPHATE SERPL-MCNC: 3.1 MG/DL (ref 2.7–4.5)
PHOSPHATE SERPL-MCNC: 3.3 MG/DL (ref 2.7–4.5)
PHOSPHATE SERPL-MCNC: 3.4 MG/DL (ref 2.7–4.5)
PHOSPHATE SERPL-MCNC: 3.4 MG/DL (ref 2.7–4.5)
PHOSPHATE SERPL-MCNC: 3.8 MG/DL (ref 2.7–4.5)
PLATELET # BLD AUTO: 131 K/UL (ref 150–450)
PLATELET # BLD AUTO: 187 K/UL (ref 150–450)
PLATELET # BLD AUTO: 247 K/UL (ref 150–450)
PLATELET # BLD AUTO: 266 K/UL (ref 150–450)
PLATELET # BLD AUTO: 288 K/UL (ref 150–450)
PLATELET # BLD AUTO: 303 K/UL (ref 150–450)
PLATELET # BLD AUTO: 303 K/UL (ref 150–450)
PLATELET BLD QL SMEAR: ABNORMAL
PLATELET BLD QL SMEAR: ABNORMAL
PMV BLD AUTO: 10.1 FL (ref 9.2–12.9)
PMV BLD AUTO: 10.1 FL (ref 9.2–12.9)
PMV BLD AUTO: 10.2 FL (ref 9.2–12.9)
PMV BLD AUTO: 10.4 FL (ref 9.2–12.9)
PMV BLD AUTO: 10.7 FL (ref 9.2–12.9)
PMV BLD AUTO: 9.3 FL (ref 9.2–12.9)
PMV BLD AUTO: 9.3 FL (ref 9.2–12.9)
POLYCHROMASIA BLD QL SMEAR: ABNORMAL
POTASSIUM SERPL-SCNC: 4.4 MMOL/L (ref 3.5–5.1)
POTASSIUM SERPL-SCNC: 4.4 MMOL/L (ref 3.5–5.1)
POTASSIUM SERPL-SCNC: 4.6 MMOL/L (ref 3.5–5.1)
PROT SERPL-MCNC: 6.9 G/DL (ref 6–8.4)
PROT SERPL-MCNC: 7.3 G/DL (ref 6–8.4)
PROT SERPL-MCNC: 7.4 G/DL (ref 6–8.4)
PROT SERPL-MCNC: 7.5 G/DL (ref 6–8.4)
PROT SERPL-MCNC: 7.9 G/DL (ref 6–8.4)
PROT SERPL-MCNC: 7.9 G/DL (ref 6–8.4)
PROTHROMBIN TIME: 14.1 SEC (ref 11.8–14.3)
RBC # BLD AUTO: 2.99 M/UL (ref 4.6–6.2)
RBC # BLD AUTO: 3.19 M/UL (ref 4.6–6.2)
RBC # BLD AUTO: 3.42 M/UL (ref 4.6–6.2)
RBC # BLD AUTO: 3.56 M/UL (ref 4.6–6.2)
RBC # BLD AUTO: 3.63 M/UL (ref 4.6–6.2)
RBC # BLD AUTO: 3.63 M/UL (ref 4.6–6.2)
RBC # BLD AUTO: 4.37 M/UL (ref 4.6–6.2)
SAMPLE: NORMAL
SARS-COV-2 RDRP RESP QL NAA+PROBE: NEGATIVE
SODIUM SERPL-SCNC: 134 MMOL/L (ref 136–145)
SODIUM SERPL-SCNC: 135 MMOL/L (ref 136–145)
SODIUM SERPL-SCNC: 139 MMOL/L (ref 136–145)
TOXIC GRANULES BLD QL SMEAR: PRESENT
TSH SERPL DL<=0.005 MIU/L-ACNC: 2.47 UIU/ML (ref 0.34–5.6)
WBC # BLD AUTO: 12.47 K/UL (ref 3.9–12.7)
WBC # BLD AUTO: 13.64 K/UL (ref 3.9–12.7)
WBC # BLD AUTO: 22.15 K/UL (ref 3.9–12.7)
WBC # BLD AUTO: 5.84 K/UL (ref 3.9–12.7)
WBC # BLD AUTO: 8.42 K/UL (ref 3.9–12.7)
WBC # BLD AUTO: 9.52 K/UL (ref 3.9–12.7)
WBC # BLD AUTO: 9.52 K/UL (ref 3.9–12.7)

## 2021-01-01 PROCEDURE — 1125F PR PAIN SEVERITY QUANTIFIED, PAIN PRESENT: ICD-10-PCS | Mod: S$GLB,,, | Performed by: RADIOLOGY

## 2021-01-01 PROCEDURE — 25000003 PHARM REV CODE 250: Performed by: INTERNAL MEDICINE

## 2021-01-01 PROCEDURE — 25500020 PHARM REV CODE 255: Performed by: INTERNAL MEDICINE

## 2021-01-01 PROCEDURE — 3288F PR FALLS RISK ASSESSMENT DOCUMENTED: ICD-10-PCS | Mod: S$GLB,,, | Performed by: NURSE PRACTITIONER

## 2021-01-01 PROCEDURE — 3008F BODY MASS INDEX DOCD: CPT | Mod: S$GLB,,, | Performed by: INTERNAL MEDICINE

## 2021-01-01 PROCEDURE — 84100 ASSAY OF PHOSPHORUS: CPT | Performed by: INTERNAL MEDICINE

## 2021-01-01 PROCEDURE — 3288F FALL RISK ASSESSMENT DOCD: CPT | Mod: S$GLB,,, | Performed by: INTERNAL MEDICINE

## 2021-01-01 PROCEDURE — 3077F PR MOST RECENT SYSTOLIC BLOOD PRESSURE >= 140 MM HG: ICD-10-PCS | Mod: S$GLB,,, | Performed by: INTERNAL MEDICINE

## 2021-01-01 PROCEDURE — 36415 COLL VENOUS BLD VENIPUNCTURE: CPT | Performed by: INTERNAL MEDICINE

## 2021-01-01 PROCEDURE — 96367 TX/PROPH/DG ADDL SEQ IV INF: CPT

## 2021-01-01 PROCEDURE — 63600175 PHARM REV CODE 636 W HCPCS: Performed by: INTERNAL MEDICINE

## 2021-01-01 PROCEDURE — 99213 PR OFFICE/OUTPT VISIT, EST, LEVL III, 20-29 MIN: ICD-10-PCS | Mod: S$GLB,,, | Performed by: INTERNAL MEDICINE

## 2021-01-01 PROCEDURE — 63600175 PHARM REV CODE 636 W HCPCS: Performed by: RADIOLOGY

## 2021-01-01 PROCEDURE — 3288F FALL RISK ASSESSMENT DOCD: CPT | Mod: S$GLB,,, | Performed by: RADIOLOGY

## 2021-01-01 PROCEDURE — 3078F PR MOST RECENT DIASTOLIC BLOOD PRESSURE < 80 MM HG: ICD-10-PCS | Mod: S$GLB,,, | Performed by: RADIOLOGY

## 2021-01-01 PROCEDURE — 4010F ACE/ARB THERAPY RXD/TAKEN: CPT | Mod: S$GLB,,, | Performed by: INTERNAL MEDICINE

## 2021-01-01 PROCEDURE — 3077F SYST BP >= 140 MM HG: CPT | Mod: S$GLB,,, | Performed by: INTERNAL MEDICINE

## 2021-01-01 PROCEDURE — 83735 ASSAY OF MAGNESIUM: CPT | Performed by: INTERNAL MEDICINE

## 2021-01-01 PROCEDURE — A9585 GADOBUTROL INJECTION: HCPCS | Mod: PO | Performed by: INTERNAL MEDICINE

## 2021-01-01 PROCEDURE — 96413 CHEMO IV INFUSION 1 HR: CPT

## 2021-01-01 PROCEDURE — 4010F ACE/ARB THERAPY RXD/TAKEN: CPT | Mod: S$GLB,,, | Performed by: NURSE PRACTITIONER

## 2021-01-01 PROCEDURE — 71046 X-RAY EXAM CHEST 2 VIEWS: CPT | Mod: TC

## 2021-01-01 PROCEDURE — 88185 FLOWCYTOMETRY/TC ADD-ON: CPT | Mod: TC,59

## 2021-01-01 PROCEDURE — 3008F BODY MASS INDEX DOCD: CPT | Mod: S$GLB,,, | Performed by: NURSE PRACTITIONER

## 2021-01-01 PROCEDURE — 1125F AMNT PAIN NOTED PAIN PRSNT: CPT | Mod: S$GLB,,, | Performed by: INTERNAL MEDICINE

## 2021-01-01 PROCEDURE — 63600175 PHARM REV CODE 636 W HCPCS: Mod: JG | Performed by: INTERNAL MEDICINE

## 2021-01-01 PROCEDURE — 3079F DIAST BP 80-89 MM HG: CPT | Mod: S$GLB,,, | Performed by: INTERNAL MEDICINE

## 2021-01-01 PROCEDURE — 4010F ACE/ARB THERAPY RXD/TAKEN: CPT | Mod: S$GLB,,, | Performed by: RADIOLOGY

## 2021-01-01 PROCEDURE — 85610 PROTHROMBIN TIME: CPT | Performed by: RADIOLOGY

## 2021-01-01 PROCEDURE — 3077F PR MOST RECENT SYSTOLIC BLOOD PRESSURE >= 140 MM HG: ICD-10-PCS | Mod: S$GLB,,, | Performed by: NURSE PRACTITIONER

## 2021-01-01 PROCEDURE — 99215 PR OFFICE/OUTPT VISIT, EST, LEVL V, 40-54 MIN: ICD-10-PCS | Mod: S$GLB,,, | Performed by: NURSE PRACTITIONER

## 2021-01-01 PROCEDURE — 1159F MED LIST DOCD IN RCRD: CPT | Mod: S$GLB,,, | Performed by: INTERNAL MEDICINE

## 2021-01-01 PROCEDURE — 1101F PT FALLS ASSESS-DOCD LE1/YR: CPT | Mod: S$GLB,,, | Performed by: INTERNAL MEDICINE

## 2021-01-01 PROCEDURE — 85025 COMPLETE CBC W/AUTO DIFF WBC: CPT | Performed by: INTERNAL MEDICINE

## 2021-01-01 PROCEDURE — 1160F PR REVIEW ALL MEDS BY PRESCRIBER/CLIN PHARMACIST DOCUMENTED: ICD-10-PCS | Mod: S$GLB,,, | Performed by: NURSE PRACTITIONER

## 2021-01-01 PROCEDURE — 4010F PR ACE/ARB THEARPY RXD/TAKEN: ICD-10-PCS | Mod: S$GLB,,, | Performed by: INTERNAL MEDICINE

## 2021-01-01 PROCEDURE — 1159F PR MEDICATION LIST DOCUMENTED IN MEDICAL RECORD: ICD-10-PCS | Mod: S$GLB,,, | Performed by: INTERNAL MEDICINE

## 2021-01-01 PROCEDURE — 3288F PR FALLS RISK ASSESSMENT DOCUMENTED: ICD-10-PCS | Mod: S$GLB,,, | Performed by: INTERNAL MEDICINE

## 2021-01-01 PROCEDURE — 85730 THROMBOPLASTIN TIME PARTIAL: CPT | Performed by: RADIOLOGY

## 2021-01-01 PROCEDURE — 3008F BODY MASS INDEX DOCD: CPT | Mod: S$GLB,,, | Performed by: RADIOLOGY

## 2021-01-01 PROCEDURE — 99152 MOD SED SAME PHYS/QHP 5/>YRS: CPT

## 2021-01-01 PROCEDURE — 96375 TX/PRO/DX INJ NEW DRUG ADDON: CPT

## 2021-01-01 PROCEDURE — 99213 OFFICE O/P EST LOW 20 MIN: CPT | Mod: S$GLB,,, | Performed by: INTERNAL MEDICINE

## 2021-01-01 PROCEDURE — 3078F PR MOST RECENT DIASTOLIC BLOOD PRESSURE < 80 MM HG: ICD-10-PCS | Mod: S$GLB,,, | Performed by: INTERNAL MEDICINE

## 2021-01-01 PROCEDURE — 1126F PR PAIN SEVERITY QUANTIFIED, NO PAIN PRESENT: ICD-10-PCS | Mod: S$GLB,,, | Performed by: INTERNAL MEDICINE

## 2021-01-01 PROCEDURE — 70553 MRI BRAIN STEM W/O & W/DYE: CPT | Mod: TC,PO

## 2021-01-01 PROCEDURE — 85027 COMPLETE CBC AUTOMATED: CPT | Performed by: INTERNAL MEDICINE

## 2021-01-01 PROCEDURE — 99205 PR OFFICE/OUTPT VISIT, NEW, LEVL V, 60-74 MIN: ICD-10-PCS | Mod: S$GLB,,, | Performed by: RADIOLOGY

## 2021-01-01 PROCEDURE — 99214 OFFICE O/P EST MOD 30 MIN: CPT | Mod: S$GLB,,, | Performed by: INTERNAL MEDICINE

## 2021-01-01 PROCEDURE — 25000003 PHARM REV CODE 250: Performed by: RADIOLOGY

## 2021-01-01 PROCEDURE — 80053 COMPREHEN METABOLIC PANEL: CPT | Performed by: INTERNAL MEDICINE

## 2021-01-01 PROCEDURE — 1125F PR PAIN SEVERITY QUANTIFIED, PAIN PRESENT: ICD-10-PCS | Mod: S$GLB,,, | Performed by: INTERNAL MEDICINE

## 2021-01-01 PROCEDURE — 99205 OFFICE O/P NEW HI 60 MIN: CPT | Mod: S$GLB,,, | Performed by: RADIOLOGY

## 2021-01-01 PROCEDURE — 99215 OFFICE O/P EST HI 40 MIN: CPT | Mod: S$GLB,,, | Performed by: NURSE PRACTITIONER

## 2021-01-01 PROCEDURE — 3078F PR MOST RECENT DIASTOLIC BLOOD PRESSURE < 80 MM HG: ICD-10-PCS | Mod: S$GLB,,, | Performed by: NURSE PRACTITIONER

## 2021-01-01 PROCEDURE — 1101F PR PT FALLS ASSESS DOC 0-1 FALLS W/OUT INJ PAST YR: ICD-10-PCS | Mod: S$GLB,,, | Performed by: RADIOLOGY

## 2021-01-01 PROCEDURE — 3078F DIAST BP <80 MM HG: CPT | Mod: S$GLB,,, | Performed by: RADIOLOGY

## 2021-01-01 PROCEDURE — 96417 CHEMO IV INFUS EACH ADDL SEQ: CPT

## 2021-01-01 PROCEDURE — 1101F PR PT FALLS ASSESS DOC 0-1 FALLS W/OUT INJ PAST YR: ICD-10-PCS | Mod: S$GLB,,, | Performed by: INTERNAL MEDICINE

## 2021-01-01 PROCEDURE — 3288F FALL RISK ASSESSMENT DOCD: CPT | Mod: S$GLB,,, | Performed by: NURSE PRACTITIONER

## 2021-01-01 PROCEDURE — 3008F PR BODY MASS INDEX (BMI) DOCUMENTED: ICD-10-PCS | Mod: S$GLB,,, | Performed by: INTERNAL MEDICINE

## 2021-01-01 PROCEDURE — 1101F PR PT FALLS ASSESS DOC 0-1 FALLS W/OUT INJ PAST YR: ICD-10-PCS | Mod: S$GLB,,, | Performed by: NURSE PRACTITIONER

## 2021-01-01 PROCEDURE — 96372 THER/PROPH/DIAG INJ SC/IM: CPT

## 2021-01-01 PROCEDURE — 4010F PR ACE/ARB THEARPY RXD/TAKEN: ICD-10-PCS | Mod: S$GLB,,, | Performed by: RADIOLOGY

## 2021-01-01 PROCEDURE — 1159F PR MEDICATION LIST DOCUMENTED IN MEDICAL RECORD: ICD-10-PCS | Mod: S$GLB,,, | Performed by: NURSE PRACTITIONER

## 2021-01-01 PROCEDURE — 3078F DIAST BP <80 MM HG: CPT | Mod: S$GLB,,, | Performed by: INTERNAL MEDICINE

## 2021-01-01 PROCEDURE — 3079F PR MOST RECENT DIASTOLIC BLOOD PRESSURE 80-89 MM HG: ICD-10-PCS | Mod: S$GLB,,, | Performed by: INTERNAL MEDICINE

## 2021-01-01 PROCEDURE — 4010F PR ACE/ARB THEARPY RXD/TAKEN: ICD-10-PCS | Mod: S$GLB,,, | Performed by: NURSE PRACTITIONER

## 2021-01-01 PROCEDURE — 3288F PR FALLS RISK ASSESSMENT DOCUMENTED: ICD-10-PCS | Mod: S$GLB,,, | Performed by: RADIOLOGY

## 2021-01-01 PROCEDURE — 3074F PR MOST RECENT SYSTOLIC BLOOD PRESSURE < 130 MM HG: ICD-10-PCS | Mod: S$GLB,,, | Performed by: INTERNAL MEDICINE

## 2021-01-01 PROCEDURE — 1126F PR PAIN SEVERITY QUANTIFIED, NO PAIN PRESENT: ICD-10-PCS | Mod: S$GLB,,, | Performed by: NURSE PRACTITIONER

## 2021-01-01 PROCEDURE — 25500020 PHARM REV CODE 255: Mod: PO | Performed by: INTERNAL MEDICINE

## 2021-01-01 PROCEDURE — 85007 BL SMEAR W/DIFF WBC COUNT: CPT | Performed by: INTERNAL MEDICINE

## 2021-01-01 PROCEDURE — 1126F AMNT PAIN NOTED NONE PRSNT: CPT | Mod: S$GLB,,, | Performed by: NURSE PRACTITIONER

## 2021-01-01 PROCEDURE — 85025 COMPLETE CBC W/AUTO DIFF WBC: CPT | Performed by: RADIOLOGY

## 2021-01-01 PROCEDURE — 1125F AMNT PAIN NOTED PAIN PRSNT: CPT | Mod: S$GLB,,, | Performed by: RADIOLOGY

## 2021-01-01 PROCEDURE — 1159F MED LIST DOCD IN RCRD: CPT | Mod: S$GLB,,, | Performed by: NURSE PRACTITIONER

## 2021-01-01 PROCEDURE — 99204 OFFICE O/P NEW MOD 45 MIN: CPT | Mod: S$GLB,,, | Performed by: INTERNAL MEDICINE

## 2021-01-01 PROCEDURE — A4216 STERILE WATER/SALINE, 10 ML: HCPCS | Performed by: INTERNAL MEDICINE

## 2021-01-01 PROCEDURE — 32408 CORE NDL BX LNG/MED PERQ: CPT

## 2021-01-01 PROCEDURE — 1101F PT FALLS ASSESS-DOCD LE1/YR: CPT | Mod: S$GLB,,, | Performed by: RADIOLOGY

## 2021-01-01 PROCEDURE — 3077F SYST BP >= 140 MM HG: CPT | Mod: S$GLB,,, | Performed by: NURSE PRACTITIONER

## 2021-01-01 PROCEDURE — 96415 CHEMO IV INFUSION ADDL HR: CPT

## 2021-01-01 PROCEDURE — 1101F PT FALLS ASSESS-DOCD LE1/YR: CPT | Mod: S$GLB,,, | Performed by: NURSE PRACTITIONER

## 2021-01-01 PROCEDURE — 99204 PR OFFICE/OUTPT VISIT, NEW, LEVL IV, 45-59 MIN: ICD-10-PCS | Mod: S$GLB,,, | Performed by: INTERNAL MEDICINE

## 2021-01-01 PROCEDURE — 3008F PR BODY MASS INDEX (BMI) DOCUMENTED: ICD-10-PCS | Mod: S$GLB,,, | Performed by: NURSE PRACTITIONER

## 2021-01-01 PROCEDURE — 82962 GLUCOSE BLOOD TEST: CPT | Mod: PO

## 2021-01-01 PROCEDURE — 3008F PR BODY MASS INDEX (BMI) DOCUMENTED: ICD-10-PCS | Mod: S$GLB,,, | Performed by: RADIOLOGY

## 2021-01-01 PROCEDURE — 99214 PR OFFICE/OUTPT VISIT, EST, LEVL IV, 30-39 MIN: ICD-10-PCS | Mod: S$GLB,,, | Performed by: INTERNAL MEDICINE

## 2021-01-01 PROCEDURE — 88305 TISSUE EXAM BY PATHOLOGIST: CPT | Mod: TC

## 2021-01-01 PROCEDURE — 3077F SYST BP >= 140 MM HG: CPT | Mod: S$GLB,,, | Performed by: RADIOLOGY

## 2021-01-01 PROCEDURE — 3078F DIAST BP <80 MM HG: CPT | Mod: S$GLB,,, | Performed by: NURSE PRACTITIONER

## 2021-01-01 PROCEDURE — 3074F SYST BP LT 130 MM HG: CPT | Mod: S$GLB,,, | Performed by: INTERNAL MEDICINE

## 2021-01-01 PROCEDURE — 1126F AMNT PAIN NOTED NONE PRSNT: CPT | Mod: S$GLB,,, | Performed by: INTERNAL MEDICINE

## 2021-01-01 PROCEDURE — A4215 STERILE NEEDLE: HCPCS

## 2021-01-01 PROCEDURE — 3077F PR MOST RECENT SYSTOLIC BLOOD PRESSURE >= 140 MM HG: ICD-10-PCS | Mod: S$GLB,,, | Performed by: RADIOLOGY

## 2021-01-01 PROCEDURE — 1160F RVW MEDS BY RX/DR IN RCRD: CPT | Mod: S$GLB,,, | Performed by: NURSE PRACTITIONER

## 2021-01-01 PROCEDURE — 71260 CT THORAX DX C+: CPT | Mod: TC

## 2021-01-01 PROCEDURE — 84443 ASSAY THYROID STIM HORMONE: CPT | Performed by: INTERNAL MEDICINE

## 2021-01-01 PROCEDURE — U0002 COVID-19 LAB TEST NON-CDC: HCPCS | Performed by: RADIOLOGY

## 2021-01-01 RX ORDER — ONDANSETRON 2 MG/ML
16 INJECTION INTRAMUSCULAR; INTRAVENOUS ONCE
Status: CANCELLED
Start: 2021-01-01 | End: 2021-01-01

## 2021-01-01 RX ORDER — EPINEPHRINE 0.3 MG/.3ML
0.3 INJECTION SUBCUTANEOUS ONCE AS NEEDED
Status: CANCELLED | OUTPATIENT
Start: 2021-01-01

## 2021-01-01 RX ORDER — SODIUM CHLORIDE 0.9 % (FLUSH) 0.9 %
10 SYRINGE (ML) INJECTION
Status: DISCONTINUED | OUTPATIENT
Start: 2021-01-01 | End: 2021-01-01 | Stop reason: HOSPADM

## 2021-01-01 RX ORDER — PROMETHAZINE HYDROCHLORIDE 25 MG/1
25 TABLET ORAL
Qty: 30 TABLET | Refills: 5 | Status: SHIPPED | OUTPATIENT
Start: 2021-01-01

## 2021-01-01 RX ORDER — PANCRELIPASE LIPASE, PANCRELIPASE PROTEASE, PANCRELIPASE AMYLASE 40000; 126000; 168000 [USP'U]/1; [USP'U]/1; [USP'U]/1
CAPSULE, DELAYED RELEASE ORAL
Qty: 80 CAPSULE | Refills: 2 | Status: ON HOLD | OUTPATIENT
Start: 2021-01-01 | End: 2022-01-01 | Stop reason: HOSPADM

## 2021-01-01 RX ORDER — DEXAMETHASONE SODIUM PHOSPHATE 4 MG/ML
12 INJECTION, SOLUTION INTRA-ARTICULAR; INTRALESIONAL; INTRAMUSCULAR; INTRAVENOUS; SOFT TISSUE ONCE
Status: CANCELLED
Start: 2021-01-01

## 2021-01-01 RX ORDER — MIDAZOLAM HYDROCHLORIDE 1 MG/ML
INJECTION INTRAMUSCULAR; INTRAVENOUS CODE/TRAUMA/SEDATION MEDICATION
Status: COMPLETED | OUTPATIENT
Start: 2021-01-01 | End: 2021-01-01

## 2021-01-01 RX ORDER — SODIUM CHLORIDE 0.9 % (FLUSH) 0.9 %
10 SYRINGE (ML) INJECTION
Status: CANCELLED | OUTPATIENT
Start: 2021-01-01

## 2021-01-01 RX ORDER — FAMOTIDINE 10 MG/ML
20 INJECTION INTRAVENOUS
Status: CANCELLED
Start: 2021-01-01 | End: 2021-01-01

## 2021-01-01 RX ORDER — DIPHENHYDRAMINE HYDROCHLORIDE 50 MG/ML
50 INJECTION INTRAMUSCULAR; INTRAVENOUS ONCE AS NEEDED
Status: CANCELLED | OUTPATIENT
Start: 2021-01-01

## 2021-01-01 RX ORDER — HEPARIN 100 UNIT/ML
500 SYRINGE INTRAVENOUS
Status: CANCELLED | OUTPATIENT
Start: 2021-01-01

## 2021-01-01 RX ORDER — TADALAFIL 5 MG/1
5 TABLET ORAL DAILY
Status: ON HOLD | COMMUNITY
Start: 2021-01-01 | End: 2022-01-01 | Stop reason: HOSPADM

## 2021-01-01 RX ORDER — DIPHENHYDRAMINE HYDROCHLORIDE 50 MG/ML
25 INJECTION INTRAMUSCULAR; INTRAVENOUS ONCE
Status: CANCELLED | OUTPATIENT
Start: 2021-01-01 | End: 2021-01-01

## 2021-01-01 RX ORDER — ONDANSETRON HYDROCHLORIDE 8 MG/1
8 TABLET, FILM COATED ORAL EVERY 8 HOURS PRN
Qty: 30 TABLET | Refills: 5 | Status: SHIPPED | OUTPATIENT
Start: 2021-01-01 | End: 2022-09-28

## 2021-01-01 RX ORDER — FAMOTIDINE 10 MG/ML
20 INJECTION INTRAVENOUS
Status: COMPLETED | OUTPATIENT
Start: 2021-01-01 | End: 2021-01-01

## 2021-01-01 RX ORDER — HEPARIN 100 UNIT/ML
500 SYRINGE INTRAVENOUS
Status: DISCONTINUED | OUTPATIENT
Start: 2021-01-01 | End: 2021-01-01 | Stop reason: HOSPADM

## 2021-01-01 RX ORDER — FAMOTIDINE 10 MG/ML
20 INJECTION INTRAVENOUS
Status: CANCELLED
Start: 2021-01-01

## 2021-01-01 RX ORDER — EPINEPHRINE 0.3 MG/.3ML
0.3 INJECTION SUBCUTANEOUS ONCE AS NEEDED
Status: DISCONTINUED | OUTPATIENT
Start: 2021-01-01 | End: 2021-01-01 | Stop reason: HOSPADM

## 2021-01-01 RX ORDER — HYDROCODONE BITARTRATE AND ACETAMINOPHEN 5; 325 MG/1; MG/1
1 TABLET ORAL EVERY 6 HOURS PRN
Qty: 40 TABLET | Refills: 0 | OUTPATIENT
Start: 2021-01-01 | End: 2022-01-01

## 2021-01-01 RX ORDER — LEVOFLOXACIN 500 MG/1
500 TABLET, FILM COATED ORAL DAILY
Qty: 10 TABLET | Refills: 0 | Status: SHIPPED | OUTPATIENT
Start: 2021-01-01 | End: 2022-01-01

## 2021-01-01 RX ORDER — GADOBUTROL 604.72 MG/ML
8.5 INJECTION INTRAVENOUS
Status: COMPLETED | OUTPATIENT
Start: 2021-01-01 | End: 2021-01-01

## 2021-01-01 RX ORDER — SODIUM CHLORIDE 9 MG/ML
INJECTION, SOLUTION INTRAVENOUS
Status: COMPLETED | OUTPATIENT
Start: 2021-01-01 | End: 2021-01-01

## 2021-01-01 RX ORDER — FENTANYL CITRATE 50 UG/ML
INJECTION, SOLUTION INTRAMUSCULAR; INTRAVENOUS CODE/TRAUMA/SEDATION MEDICATION
Status: COMPLETED | OUTPATIENT
Start: 2021-01-01 | End: 2021-01-01

## 2021-01-01 RX ADMIN — FENTANYL CITRATE 25 MCG: 50 INJECTION INTRAMUSCULAR; INTRAVENOUS at 11:09

## 2021-01-01 RX ADMIN — ONDANSETRON 16 MG: 2 INJECTION INTRAMUSCULAR; INTRAVENOUS at 10:10

## 2021-01-01 RX ADMIN — ETOPOSIDE 206 MG: 20 INJECTION INTRAVENOUS at 12:10

## 2021-01-01 RX ADMIN — FAMOTIDINE 20 MG: 10 INJECTION INTRAVENOUS at 10:09

## 2021-01-01 RX ADMIN — APREPITANT 130 MG: 130 INJECTION, EMULSION INTRAVENOUS at 09:11

## 2021-01-01 RX ADMIN — ONDANSETRON 16 MG: 2 INJECTION INTRAMUSCULAR; INTRAVENOUS at 10:09

## 2021-01-01 RX ADMIN — ETOPOSIDE 206 MG: 20 INJECTION INTRAVENOUS at 11:11

## 2021-01-01 RX ADMIN — PEGFILGRASTIM 6 MG: 6 INJECTION SUBCUTANEOUS at 01:10

## 2021-01-01 RX ADMIN — ONDANSETRON 16 MG: 2 INJECTION INTRAMUSCULAR; INTRAVENOUS at 10:11

## 2021-01-01 RX ADMIN — SODIUM CHLORIDE: 9 INJECTION, SOLUTION INTRAVENOUS at 10:10

## 2021-01-01 RX ADMIN — DEXAMETHASONE SODIUM PHOSPHATE: 4 INJECTION, SOLUTION INTRA-ARTICULAR; INTRALESIONAL; INTRAMUSCULAR; INTRAVENOUS; SOFT TISSUE at 11:10

## 2021-01-01 RX ADMIN — ETOPOSIDE 206 MG: 20 INJECTION INTRAVENOUS at 11:10

## 2021-01-01 RX ADMIN — PEGFILGRASTIM 6 MG: 6 INJECTION SUBCUTANEOUS at 02:10

## 2021-01-01 RX ADMIN — SODIUM CHLORIDE: 9 INJECTION, SOLUTION INTRAVENOUS at 10:09

## 2021-01-01 RX ADMIN — SODIUM CHLORIDE: 9 INJECTION, SOLUTION INTRAVENOUS at 10:11

## 2021-01-01 RX ADMIN — PEGFILGRASTIM 6 MG: 6 INJECTION SUBCUTANEOUS at 08:11

## 2021-01-01 RX ADMIN — MIDAZOLAM HYDROCHLORIDE 1 MG: 1 INJECTION, SOLUTION INTRAMUSCULAR; INTRAVENOUS at 11:09

## 2021-01-01 RX ADMIN — SODIUM CHLORIDE 250 ML/HR: 9 INJECTION, SOLUTION INTRAVENOUS at 11:09

## 2021-01-01 RX ADMIN — SODIUM CHLORIDE 10 ML: 9 INJECTION INTRAMUSCULAR; INTRAVENOUS; SUBCUTANEOUS at 03:10

## 2021-01-01 RX ADMIN — FAMOTIDINE 20 MG: 10 INJECTION INTRAVENOUS at 10:10

## 2021-01-01 RX ADMIN — ETOPOSIDE 206 MG: 20 INJECTION INTRAVENOUS at 11:09

## 2021-01-01 RX ADMIN — ETOPOSIDE 206 MG: 20 INJECTION INTRAVENOUS at 12:09

## 2021-01-01 RX ADMIN — DEXAMETHASONE SODIUM PHOSPHATE: 4 INJECTION, SOLUTION INTRA-ARTICULAR; INTRALESIONAL; INTRAMUSCULAR; INTRAVENOUS; SOFT TISSUE at 09:11

## 2021-01-01 RX ADMIN — SODIUM CHLORIDE: 9 INJECTION, SOLUTION INTRAVENOUS at 11:10

## 2021-01-01 RX ADMIN — DEXAMETHASONE SODIUM PHOSPHATE 12 MG: 4 INJECTION, SOLUTION INTRA-ARTICULAR; INTRALESIONAL; INTRAMUSCULAR; INTRAVENOUS; SOFT TISSUE at 11:10

## 2021-01-01 RX ADMIN — ETOPOSIDE 206 MG: 20 INJECTION INTRAVENOUS at 10:11

## 2021-01-01 RX ADMIN — CARBOPLATIN 620 MG: 10 INJECTION, SOLUTION INTRAVENOUS at 10:09

## 2021-01-01 RX ADMIN — GADOBUTROL 8.5 ML: 604.72 INJECTION INTRAVENOUS at 03:09

## 2021-01-01 RX ADMIN — DEXAMETHASONE SODIUM PHOSPHATE 12 MG: 4 INJECTION, SOLUTION INTRA-ARTICULAR; INTRALESIONAL; INTRAMUSCULAR; INTRAVENOUS; SOFT TISSUE at 10:11

## 2021-01-01 RX ADMIN — DEXAMETHASONE SODIUM PHOSPHATE 12 MG: 4 INJECTION, SOLUTION INTRA-ARTICULAR; INTRALESIONAL; INTRAMUSCULAR; INTRAVENOUS; SOFT TISSUE at 10:09

## 2021-01-01 RX ADMIN — ONDANSETRON 16 MG: 2 INJECTION INTRAMUSCULAR; INTRAVENOUS at 11:10

## 2021-01-01 RX ADMIN — APREPITANT 130 MG: 130 INJECTION, EMULSION INTRAVENOUS at 10:10

## 2021-01-01 RX ADMIN — DEXAMETHASONE SODIUM PHOSPHATE 12 MG: 4 INJECTION, SOLUTION INTRA-ARTICULAR; INTRALESIONAL; INTRAMUSCULAR; INTRAVENOUS; SOFT TISSUE at 10:10

## 2021-01-01 RX ADMIN — SODIUM CHLORIDE, PRESERVATIVE FREE 10 ML: 5 INJECTION INTRAVENOUS at 01:10

## 2021-01-01 RX ADMIN — IOHEXOL 100 ML: 350 INJECTION, SOLUTION INTRAVENOUS at 03:12

## 2021-01-01 RX ADMIN — FAMOTIDINE 20 MG: 10 INJECTION INTRAVENOUS at 09:11

## 2021-01-01 RX ADMIN — CARBOPLATIN 620 MG: 10 INJECTION, SOLUTION INTRAVENOUS at 10:11

## 2021-01-01 RX ADMIN — APREPITANT 130 MG: 130 INJECTION, EMULSION INTRAVENOUS at 10:09

## 2021-01-01 RX ADMIN — CARBOPLATIN 620 MG: 10 INJECTION, SOLUTION INTRAVENOUS at 11:10

## 2021-01-01 RX ADMIN — ONDANSETRON 16 MG: 2 INJECTION INTRAMUSCULAR; INTRAVENOUS at 09:11

## 2021-01-01 RX ADMIN — SODIUM CHLORIDE 200 MG: 9 INJECTION, SOLUTION INTRAVENOUS at 03:12

## 2021-01-01 RX ADMIN — DEXAMETHASONE SODIUM PHOSPHATE: 4 INJECTION, SOLUTION INTRA-ARTICULAR; INTRALESIONAL; INTRAMUSCULAR; INTRAVENOUS; SOFT TISSUE at 10:09

## 2021-01-16 ENCOUNTER — IMMUNIZATION (OUTPATIENT)
Dept: PRIMARY CARE CLINIC | Facility: CLINIC | Age: 76
End: 2021-01-16

## 2021-01-16 DIAGNOSIS — Z23 NEED FOR VACCINATION: Primary | ICD-10-CM

## 2021-01-16 PROCEDURE — 91300 COVID-19, MRNA, LNP-S, PF, 30 MCG/0.3 ML DOSE VACCINE: ICD-10-PCS | Mod: S$GLB,,, | Performed by: FAMILY MEDICINE

## 2021-01-16 PROCEDURE — 0001A COVID-19, MRNA, LNP-S, PF, 30 MCG/0.3 ML DOSE VACCINE: ICD-10-PCS | Mod: CV19,S$GLB,, | Performed by: FAMILY MEDICINE

## 2021-01-16 PROCEDURE — 91300 COVID-19, MRNA, LNP-S, PF, 30 MCG/0.3 ML DOSE VACCINE: CPT | Mod: S$GLB,,, | Performed by: FAMILY MEDICINE

## 2021-01-16 PROCEDURE — 0001A COVID-19, MRNA, LNP-S, PF, 30 MCG/0.3 ML DOSE VACCINE: CPT | Mod: CV19,S$GLB,, | Performed by: FAMILY MEDICINE

## 2021-02-06 ENCOUNTER — IMMUNIZATION (OUTPATIENT)
Dept: PRIMARY CARE CLINIC | Facility: CLINIC | Age: 76
End: 2021-02-06

## 2021-02-06 DIAGNOSIS — Z23 NEED FOR VACCINATION: Primary | ICD-10-CM

## 2021-02-06 PROCEDURE — 0002A COVID-19, MRNA, LNP-S, PF, 30 MCG/0.3 ML DOSE VACCINE: CPT | Mod: CV19,S$GLB,, | Performed by: FAMILY MEDICINE

## 2021-02-06 PROCEDURE — 91300 COVID-19, MRNA, LNP-S, PF, 30 MCG/0.3 ML DOSE VACCINE: ICD-10-PCS | Mod: S$GLB,,, | Performed by: FAMILY MEDICINE

## 2021-02-06 PROCEDURE — 91300 COVID-19, MRNA, LNP-S, PF, 30 MCG/0.3 ML DOSE VACCINE: CPT | Mod: S$GLB,,, | Performed by: FAMILY MEDICINE

## 2021-02-06 PROCEDURE — 0002A COVID-19, MRNA, LNP-S, PF, 30 MCG/0.3 ML DOSE VACCINE: ICD-10-PCS | Mod: CV19,S$GLB,, | Performed by: FAMILY MEDICINE

## 2021-09-25 PROBLEM — D70.1 CHEMOTHERAPY INDUCED NEUTROPENIA: Status: ACTIVE | Noted: 2021-01-01

## 2021-09-25 PROBLEM — T45.1X5A CHEMOTHERAPY INDUCED NEUTROPENIA: Status: ACTIVE | Noted: 2021-01-01

## 2021-09-25 PROBLEM — C7A.8 NEUROENDOCRINE CARCINOMA OF LUNG: Status: ACTIVE | Noted: 2021-01-01

## 2022-01-01 ENCOUNTER — TELEPHONE (OUTPATIENT)
Dept: INFUSION THERAPY | Facility: HOSPITAL | Age: 77
End: 2022-01-01
Payer: MEDICARE

## 2022-01-01 ENCOUNTER — TELEPHONE (OUTPATIENT)
Dept: ORTHOPEDICS | Facility: CLINIC | Age: 77
End: 2022-01-01
Payer: MEDICARE

## 2022-01-01 ENCOUNTER — TREATMENT (OUTPATIENT)
Dept: RADIATION ONCOLOGY | Facility: CLINIC | Age: 77
End: 2022-01-01
Payer: MEDICARE

## 2022-01-01 ENCOUNTER — ANESTHESIA EVENT (OUTPATIENT)
Dept: SURGERY | Facility: HOSPITAL | Age: 77
DRG: 493 | End: 2022-01-01
Payer: MEDICARE

## 2022-01-01 ENCOUNTER — OFFICE VISIT (OUTPATIENT)
Dept: HEMATOLOGY/ONCOLOGY | Facility: CLINIC | Age: 77
End: 2022-01-01
Payer: MEDICARE

## 2022-01-01 ENCOUNTER — TELEPHONE (OUTPATIENT)
Dept: HEMATOLOGY/ONCOLOGY | Facility: CLINIC | Age: 77
End: 2022-01-01

## 2022-01-01 ENCOUNTER — OFFICE VISIT (OUTPATIENT)
Dept: ORTHOPEDICS | Facility: CLINIC | Age: 77
End: 2022-01-01
Payer: MEDICARE

## 2022-01-01 ENCOUNTER — HOSPITAL ENCOUNTER (OUTPATIENT)
Dept: RADIOLOGY | Facility: HOSPITAL | Age: 77
Discharge: HOME | End: 2022-01-04
Attending: INTERNAL MEDICINE
Payer: MEDICARE

## 2022-01-01 ENCOUNTER — TELEPHONE (OUTPATIENT)
Dept: HEMATOLOGY/ONCOLOGY | Facility: CLINIC | Age: 77
End: 2022-01-01
Payer: MEDICARE

## 2022-01-01 ENCOUNTER — HOSPITAL ENCOUNTER (EMERGENCY)
Facility: HOSPITAL | Age: 77
Discharge: HOME | End: 2022-02-07
Attending: EMERGENCY MEDICINE
Payer: MEDICARE

## 2022-01-01 ENCOUNTER — HOSPITAL ENCOUNTER (INPATIENT)
Facility: HOSPITAL | Age: 77
LOS: 10 days | Discharge: HOSPICE | DRG: 493 | End: 2022-03-14
Attending: EMERGENCY MEDICINE | Admitting: INTERNAL MEDICINE
Payer: MEDICARE

## 2022-01-01 ENCOUNTER — HOSPITAL ENCOUNTER (OUTPATIENT)
Dept: RADIOLOGY | Facility: HOSPITAL | Age: 77
Discharge: HOME | End: 2022-02-09
Attending: ORTHOPAEDIC SURGERY
Payer: MEDICARE

## 2022-01-01 ENCOUNTER — LAB VISIT (OUTPATIENT)
Dept: LAB | Facility: HOSPITAL | Age: 77
End: 2022-01-01
Attending: INTERNAL MEDICINE
Payer: MEDICARE

## 2022-01-01 ENCOUNTER — HOSPITAL ENCOUNTER (OUTPATIENT)
Dept: RADIOLOGY | Facility: HOSPITAL | Age: 77
Discharge: HOME | End: 2022-01-31
Attending: INTERNAL MEDICINE
Payer: MEDICARE

## 2022-01-01 ENCOUNTER — CLINICAL SUPPORT (OUTPATIENT)
Dept: RADIATION ONCOLOGY | Facility: CLINIC | Age: 77
End: 2022-01-01
Payer: MEDICARE

## 2022-01-01 ENCOUNTER — ANESTHESIA (OUTPATIENT)
Dept: SURGERY | Facility: HOSPITAL | Age: 77
DRG: 493 | End: 2022-01-01
Payer: MEDICARE

## 2022-01-01 ENCOUNTER — INFUSION (OUTPATIENT)
Dept: INFUSION THERAPY | Facility: HOSPITAL | Age: 77
DRG: 493 | End: 2022-01-01
Attending: INTERNAL MEDICINE
Payer: MEDICARE

## 2022-01-01 ENCOUNTER — OFFICE VISIT (OUTPATIENT)
Dept: RADIATION ONCOLOGY | Facility: CLINIC | Age: 77
End: 2022-01-01
Payer: MEDICARE

## 2022-01-01 VITALS
TEMPERATURE: 98 F | HEART RATE: 68 BPM | BODY MASS INDEX: 26.64 KG/M2 | SYSTOLIC BLOOD PRESSURE: 123 MMHG | DIASTOLIC BLOOD PRESSURE: 81 MMHG | HEIGHT: 69 IN | OXYGEN SATURATION: 93 % | WEIGHT: 179.88 LBS | RESPIRATION RATE: 18 BRPM

## 2022-01-01 VITALS
BODY MASS INDEX: 27.47 KG/M2 | TEMPERATURE: 98 F | WEIGHT: 186 LBS | SYSTOLIC BLOOD PRESSURE: 127 MMHG | HEART RATE: 85 BPM | DIASTOLIC BLOOD PRESSURE: 70 MMHG

## 2022-01-01 VITALS
WEIGHT: 179 LBS | BODY MASS INDEX: 26.51 KG/M2 | WEIGHT: 179.63 LBS | BODY MASS INDEX: 26.61 KG/M2 | SYSTOLIC BLOOD PRESSURE: 153 MMHG | RESPIRATION RATE: 17 BRPM | TEMPERATURE: 100 F | HEIGHT: 69 IN | SYSTOLIC BLOOD PRESSURE: 153 MMHG | TEMPERATURE: 100 F | DIASTOLIC BLOOD PRESSURE: 74 MMHG | HEART RATE: 104 BPM | HEIGHT: 69 IN | RESPIRATION RATE: 17 BRPM | HEART RATE: 104 BPM | DIASTOLIC BLOOD PRESSURE: 74 MMHG

## 2022-01-01 VITALS
RESPIRATION RATE: 19 BRPM | TEMPERATURE: 97 F | HEIGHT: 69 IN | DIASTOLIC BLOOD PRESSURE: 69 MMHG | OXYGEN SATURATION: 95 % | HEART RATE: 78 BPM | WEIGHT: 186 LBS | SYSTOLIC BLOOD PRESSURE: 134 MMHG | BODY MASS INDEX: 27.55 KG/M2

## 2022-01-01 VITALS
BODY MASS INDEX: 27.85 KG/M2 | DIASTOLIC BLOOD PRESSURE: 79 MMHG | TEMPERATURE: 98 F | HEART RATE: 70 BPM | OXYGEN SATURATION: 96 % | RESPIRATION RATE: 20 BRPM | WEIGHT: 188 LBS | HEIGHT: 69 IN | SYSTOLIC BLOOD PRESSURE: 163 MMHG

## 2022-01-01 VITALS
HEART RATE: 69 BPM | DIASTOLIC BLOOD PRESSURE: 79 MMHG | SYSTOLIC BLOOD PRESSURE: 129 MMHG | RESPIRATION RATE: 18 BRPM | HEIGHT: 69 IN | WEIGHT: 188 LBS | BODY MASS INDEX: 27.85 KG/M2

## 2022-01-01 VITALS
SYSTOLIC BLOOD PRESSURE: 140 MMHG | RESPIRATION RATE: 18 BRPM | HEART RATE: 69 BPM | DIASTOLIC BLOOD PRESSURE: 73 MMHG | TEMPERATURE: 99 F | WEIGHT: 186 LBS | BODY MASS INDEX: 27.55 KG/M2 | HEIGHT: 69 IN

## 2022-01-01 VITALS — WEIGHT: 186 LBS | BODY MASS INDEX: 27.55 KG/M2 | RESPIRATION RATE: 16 BRPM | HEIGHT: 69 IN

## 2022-01-01 DIAGNOSIS — S42.364A: Primary | ICD-10-CM

## 2022-01-01 DIAGNOSIS — C7A.8 NEUROENDOCRINE CARCINOMA OF LUNG: ICD-10-CM

## 2022-01-01 DIAGNOSIS — C38.1 MALIGNANT NEOPLASM OF ANTERIOR MEDIASTINUM: ICD-10-CM

## 2022-01-01 DIAGNOSIS — C7A.8 NEUROENDOCRINE CARCINOMA OF LUNG: Primary | ICD-10-CM

## 2022-01-01 DIAGNOSIS — C7B.8 NEUROENDOCRINE CARCINOMA METASTATIC TO LUNG: ICD-10-CM

## 2022-01-01 DIAGNOSIS — E87.1 HYPONATREMIA: ICD-10-CM

## 2022-01-01 DIAGNOSIS — E03.2 HYPOTHYROIDISM DUE TO MEDICATION: ICD-10-CM

## 2022-01-01 DIAGNOSIS — R52 PAIN: ICD-10-CM

## 2022-01-01 DIAGNOSIS — R90.0 INTRACRANIAL MASS: ICD-10-CM

## 2022-01-01 DIAGNOSIS — C34.90 MALIGNANT NEOPLASM OF LUNG, UNSPECIFIED LATERALITY, UNSPECIFIED PART OF LUNG: ICD-10-CM

## 2022-01-01 DIAGNOSIS — M84.48XA PATHOLOGIC RIB FRACTURE, INITIAL ENCOUNTER: ICD-10-CM

## 2022-01-01 DIAGNOSIS — S42.411G CLOSED SUPRACONDYLAR FRACTURE OF RIGHT HUMERUS WITH DELAYED HEALING, SUBSEQUENT ENCOUNTER: ICD-10-CM

## 2022-01-01 DIAGNOSIS — D70.1 CHEMOTHERAPY INDUCED NEUTROPENIA: ICD-10-CM

## 2022-01-01 DIAGNOSIS — S42.309A FRACTURE, HUMERUS: ICD-10-CM

## 2022-01-01 DIAGNOSIS — M79.601 PAIN OF RIGHT UPPER EXTREMITY: Primary | ICD-10-CM

## 2022-01-01 DIAGNOSIS — R04.2 HEMOPTYSIS: Primary | ICD-10-CM

## 2022-01-01 DIAGNOSIS — C7A.8 NEUROENDOCRINE CARCINOMA METASTATIC TO LUNG: ICD-10-CM

## 2022-01-01 DIAGNOSIS — C7B.8 NEUROENDOCRINE CARCINOMA METASTATIC TO LUNG: Primary | ICD-10-CM

## 2022-01-01 DIAGNOSIS — R91.8 PULMONARY NODULES/LESIONS, MULTIPLE: ICD-10-CM

## 2022-01-01 DIAGNOSIS — S42.291A CLOSED FRACTURE OF HEAD OF RIGHT HUMERUS, INITIAL ENCOUNTER: ICD-10-CM

## 2022-01-01 DIAGNOSIS — T45.1X5A CHEMOTHERAPY INDUCED NEUTROPENIA: ICD-10-CM

## 2022-01-01 DIAGNOSIS — J98.59 MEDIASTINAL MASS: ICD-10-CM

## 2022-01-01 DIAGNOSIS — R04.2 HEMOPTYSIS: ICD-10-CM

## 2022-01-01 DIAGNOSIS — R07.1 CHEST PAIN ON BREATHING: ICD-10-CM

## 2022-01-01 DIAGNOSIS — C7A.8 NEUROENDOCRINE CARCINOMA METASTATIC TO LUNG: Primary | ICD-10-CM
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
## 2022-01-01 DIAGNOSIS — S42.364A: ICD-10-CM

## 2022-01-01 DIAGNOSIS — G93.9 BRAIN LESION: ICD-10-CM

## 2022-01-01 DIAGNOSIS — C79.31 METASTASIS TO BRAIN: ICD-10-CM

## 2022-01-01 DIAGNOSIS — R51.9 SCALP PAIN: ICD-10-CM

## 2022-01-01 DIAGNOSIS — M79.601 PAIN OF RIGHT UPPER EXTREMITY: ICD-10-CM

## 2022-01-01 DIAGNOSIS — S42.411S RIGHT SUPRACONDYLAR HUMERUS FRACTURE, SEQUELA: ICD-10-CM

## 2022-01-01 DIAGNOSIS — R53.1 WEAKNESS: ICD-10-CM

## 2022-01-01 DIAGNOSIS — S49.001A: Primary | ICD-10-CM

## 2022-01-01 DIAGNOSIS — C34.90 PRIMARY MALIGNANT NEOPLASM OF LUNG, UNSPECIFIED LATERALITY: Primary | ICD-10-CM

## 2022-01-01 LAB
ABO + RH BLD: NORMAL
ALBUMIN SERPL BCP-MCNC: 2.6 G/DL (ref 3.5–5.2)
ALBUMIN SERPL BCP-MCNC: 2.6 G/DL (ref 3.5–5.2)
ALBUMIN SERPL BCP-MCNC: 2.7 G/DL (ref 3.5–5.2)
ALBUMIN SERPL BCP-MCNC: 2.8 G/DL (ref 3.5–5.2)
ALBUMIN SERPL BCP-MCNC: 3 G/DL (ref 3.5–5.2)
ALBUMIN SERPL BCP-MCNC: 3.2 G/DL (ref 3.5–5.2)
ALP SERPL-CCNC: 100 U/L (ref 55–135)
ALP SERPL-CCNC: 102 U/L (ref 55–135)
ALP SERPL-CCNC: 76 U/L (ref 55–135)
ALP SERPL-CCNC: 89 U/L (ref 55–135)
ALP SERPL-CCNC: 89 U/L (ref 55–135)
ALP SERPL-CCNC: 90 U/L (ref 55–135)
ALP SERPL-CCNC: 92 U/L (ref 55–135)
ALP SERPL-CCNC: 93 U/L (ref 55–135)
ALP SERPL-CCNC: 96 U/L (ref 55–135)
ALP SERPL-CCNC: 99 U/L (ref 55–135)
ALP SERPL-CCNC: 99 U/L (ref 55–135)
ALT SERPL W/O P-5'-P-CCNC: 14 U/L (ref 10–44)
ALT SERPL W/O P-5'-P-CCNC: 15 U/L (ref 10–44)
ALT SERPL W/O P-5'-P-CCNC: 16 U/L (ref 10–44)
ALT SERPL W/O P-5'-P-CCNC: 16 U/L (ref 10–44)
ALT SERPL W/O P-5'-P-CCNC: 23 U/L (ref 10–44)
ALT SERPL W/O P-5'-P-CCNC: 23 U/L (ref 10–44)
ALT SERPL W/O P-5'-P-CCNC: 24 U/L (ref 10–44)
ALT SERPL W/O P-5'-P-CCNC: 25 U/L (ref 10–44)
ALT SERPL W/O P-5'-P-CCNC: 27 U/L (ref 10–44)
ALT SERPL W/O P-5'-P-CCNC: 28 U/L (ref 10–44)
ANION GAP SERPL CALC-SCNC: 10 MMOL/L (ref 8–16)
ANION GAP SERPL CALC-SCNC: 11 MMOL/L (ref 8–16)
ANION GAP SERPL CALC-SCNC: 11 MMOL/L (ref 8–16)
ANION GAP SERPL CALC-SCNC: 12 MMOL/L (ref 8–16)
ANION GAP SERPL CALC-SCNC: 13 MMOL/L (ref 8–16)
ANION GAP SERPL CALC-SCNC: 13 MMOL/L (ref 8–16)
ANION GAP SERPL CALC-SCNC: 15 MMOL/L (ref 8–16)
ANION GAP SERPL CALC-SCNC: 8 MMOL/L (ref 8–16)
ANION GAP SERPL CALC-SCNC: 8 MMOL/L (ref 8–16)
AST SERPL-CCNC: 26 U/L (ref 10–40)
AST SERPL-CCNC: 33 U/L (ref 10–40)
AST SERPL-CCNC: 34 U/L (ref 10–40)
AST SERPL-CCNC: 34 U/L (ref 10–40)
AST SERPL-CCNC: 35 U/L (ref 10–40)
AST SERPL-CCNC: 36 U/L (ref 10–40)
AST SERPL-CCNC: 38 U/L (ref 10–40)
AST SERPL-CCNC: 42 U/L (ref 10–40)
AST SERPL-CCNC: 45 U/L (ref 10–40)
AST SERPL-CCNC: 47 U/L (ref 10–40)
AST SERPL-CCNC: 48 U/L (ref 10–40)
BASOPHILS # BLD AUTO: 0.01 K/UL (ref 0–0.2)
BASOPHILS # BLD AUTO: 0.02 K/UL (ref 0–0.2)
BASOPHILS # BLD AUTO: 0.02 K/UL (ref 0–0.2)
BASOPHILS # BLD AUTO: 0.03 K/UL (ref 0–0.2)
BASOPHILS # BLD AUTO: 0.03 K/UL (ref 0–0.2)
BASOPHILS # BLD AUTO: 0.04 K/UL (ref 0–0.2)
BASOPHILS # BLD AUTO: 0.05 K/UL (ref 0–0.2)
BASOPHILS # BLD AUTO: 0.06 K/UL (ref 0–0.2)
BASOPHILS # BLD AUTO: 0.06 K/UL (ref 0–0.2)
BASOPHILS NFR BLD: 0.1 % (ref 0–1.9)
BASOPHILS NFR BLD: 0.2 % (ref 0–1.9)
BASOPHILS NFR BLD: 0.3 % (ref 0–1.9)
BASOPHILS NFR BLD: 0.4 % (ref 0–1.9)
BASOPHILS NFR BLD: 0.6 % (ref 0–1.9)
BILIRUB SERPL-MCNC: 0.5 MG/DL (ref 0.1–1)
BILIRUB SERPL-MCNC: 0.7 MG/DL (ref 0.1–1)
BILIRUB SERPL-MCNC: 0.8 MG/DL (ref 0.1–1)
BILIRUB SERPL-MCNC: 1.1 MG/DL (ref 0.1–1)
BILIRUB SERPL-MCNC: 1.2 MG/DL (ref 0.1–1)
BILIRUB UR QL STRIP: NEGATIVE
BLD GP AB SCN CELLS X3 SERPL QL: NORMAL
BUN SERPL-MCNC: 11 MG/DL (ref 8–23)
BUN SERPL-MCNC: 11 MG/DL (ref 8–23)
BUN SERPL-MCNC: 13 MG/DL (ref 8–23)
BUN SERPL-MCNC: 14 MG/DL (ref 8–23)
BUN SERPL-MCNC: 21 MG/DL (ref 8–23)
BUN SERPL-MCNC: 26 MG/DL (ref 8–23)
BUN SERPL-MCNC: 26 MG/DL (ref 8–23)
BUN SERPL-MCNC: 28 MG/DL (ref 8–23)
BUN SERPL-MCNC: 30 MG/DL (ref 8–23)
BUN SERPL-MCNC: 32 MG/DL (ref 8–23)
BUN SERPL-MCNC: 36 MG/DL (ref 8–23)
BUN SERPL-MCNC: 9 MG/DL (ref 8–23)
CALCIUM SERPL-MCNC: 10.1 MG/DL (ref 8.7–10.5)
CALCIUM SERPL-MCNC: 10.1 MG/DL (ref 8.7–10.5)
CALCIUM SERPL-MCNC: 10.3 MG/DL (ref 8.7–10.5)
CALCIUM SERPL-MCNC: 10.4 MG/DL (ref 8.7–10.5)
CALCIUM SERPL-MCNC: 9.3 MG/DL (ref 8.7–10.5)
CALCIUM SERPL-MCNC: 9.7 MG/DL (ref 8.7–10.5)
CALCIUM SERPL-MCNC: 9.7 MG/DL (ref 8.7–10.5)
CALCIUM SERPL-MCNC: 9.9 MG/DL (ref 8.7–10.5)
CALCIUM SERPL-MCNC: 9.9 MG/DL (ref 8.7–10.5)
CHLORIDE SERPL-SCNC: 90 MMOL/L (ref 95–110)
CHLORIDE SERPL-SCNC: 91 MMOL/L (ref 95–110)
CHLORIDE SERPL-SCNC: 91 MMOL/L (ref 95–110)
CHLORIDE SERPL-SCNC: 92 MMOL/L (ref 95–110)
CHLORIDE SERPL-SCNC: 93 MMOL/L (ref 95–110)
CHLORIDE SERPL-SCNC: 93 MMOL/L (ref 95–110)
CHLORIDE SERPL-SCNC: 94 MMOL/L (ref 95–110)
CHLORIDE SERPL-SCNC: 96 MMOL/L (ref 95–110)
CHLORIDE SERPL-SCNC: 96 MMOL/L (ref 95–110)
CHLORIDE SERPL-SCNC: 97 MMOL/L (ref 95–110)
CLARITY UR: CLEAR
CO2 SERPL-SCNC: 19 MMOL/L (ref 23–29)
CO2 SERPL-SCNC: 21 MMOL/L (ref 23–29)
CO2 SERPL-SCNC: 23 MMOL/L (ref 23–29)
CO2 SERPL-SCNC: 24 MMOL/L (ref 23–29)
CO2 SERPL-SCNC: 25 MMOL/L (ref 23–29)
CO2 SERPL-SCNC: 26 MMOL/L (ref 23–29)
CO2 SERPL-SCNC: 26 MMOL/L (ref 23–29)
CO2 SERPL-SCNC: 27 MMOL/L (ref 23–29)
CO2 SERPL-SCNC: 27 MMOL/L (ref 23–29)
COLOR UR: YELLOW
CREAT SERPL-MCNC: 0.5 MG/DL (ref 0.5–1.4)
CREAT SERPL-MCNC: 0.5 MG/DL (ref 0.5–1.4)
CREAT SERPL-MCNC: 0.6 MG/DL (ref 0.5–1.4)
CREAT SERPL-MCNC: 0.7 MG/DL (ref 0.5–1.4)
CREAT SERPL-MCNC: 0.8 MG/DL (ref 0.5–1.4)
DIFFERENTIAL METHOD: ABNORMAL
EOSINOPHIL # BLD AUTO: 0 K/UL (ref 0–0.5)
EOSINOPHIL # BLD AUTO: 0.1 K/UL (ref 0–0.5)
EOSINOPHIL # BLD AUTO: 0.2 K/UL (ref 0–0.5)
EOSINOPHIL NFR BLD: 0 % (ref 0–8)
EOSINOPHIL NFR BLD: 0.1 % (ref 0–8)
EOSINOPHIL NFR BLD: 0.2 % (ref 0–8)
EOSINOPHIL NFR BLD: 0.7 % (ref 0–8)
EOSINOPHIL NFR BLD: 1.2 % (ref 0–8)
EOSINOPHIL NFR BLD: 3.2 % (ref 0–8)
ERYTHROCYTE [DISTWIDTH] IN BLOOD BY AUTOMATED COUNT: 13.7 % (ref 11.5–14.5)
ERYTHROCYTE [DISTWIDTH] IN BLOOD BY AUTOMATED COUNT: 14.6 % (ref 11.5–14.5)
ERYTHROCYTE [DISTWIDTH] IN BLOOD BY AUTOMATED COUNT: 14.7 % (ref 11.5–14.5)
ERYTHROCYTE [DISTWIDTH] IN BLOOD BY AUTOMATED COUNT: 14.8 % (ref 11.5–14.5)
ERYTHROCYTE [DISTWIDTH] IN BLOOD BY AUTOMATED COUNT: 15 % (ref 11.5–14.5)
ERYTHROCYTE [DISTWIDTH] IN BLOOD BY AUTOMATED COUNT: 15.2 % (ref 11.5–14.5)
ERYTHROCYTE [DISTWIDTH] IN BLOOD BY AUTOMATED COUNT: 15.3 % (ref 11.5–14.5)
ERYTHROCYTE [DISTWIDTH] IN BLOOD BY AUTOMATED COUNT: 15.5 % (ref 11.5–14.5)
ERYTHROCYTE [DISTWIDTH] IN BLOOD BY AUTOMATED COUNT: 15.5 % (ref 11.5–14.5)
EST. GFR  (AFRICAN AMERICAN): >60 ML/MIN/1.73 M^2
EST. GFR  (NON AFRICAN AMERICAN): >60 ML/MIN/1.73 M^2
GLUCOSE SERPL-MCNC: 105 MG/DL (ref 70–110)
GLUCOSE SERPL-MCNC: 110 MG/DL (ref 70–110)
GLUCOSE SERPL-MCNC: 112 MG/DL (ref 70–110)
GLUCOSE SERPL-MCNC: 118 MG/DL (ref 70–110)
GLUCOSE SERPL-MCNC: 122 MG/DL (ref 70–110)
GLUCOSE SERPL-MCNC: 137 MG/DL (ref 70–110)
GLUCOSE SERPL-MCNC: 137 MG/DL (ref 70–110)
GLUCOSE SERPL-MCNC: 154 MG/DL (ref 70–110)
GLUCOSE SERPL-MCNC: 159 MG/DL (ref 70–110)
GLUCOSE SERPL-MCNC: 167 MG/DL (ref 70–110)
GLUCOSE SERPL-MCNC: 172 MG/DL (ref 70–110)
GLUCOSE SERPL-MCNC: 182 MG/DL (ref 70–110)
GLUCOSE SERPL-MCNC: 190 MG/DL (ref 70–110)
GLUCOSE SERPL-MCNC: 198 MG/DL (ref 70–110)
GLUCOSE UR QL STRIP: NEGATIVE
HCT VFR BLD AUTO: 33.1 % (ref 40–54)
HCT VFR BLD AUTO: 33.6 % (ref 40–54)
HCT VFR BLD AUTO: 33.9 % (ref 40–54)
HCT VFR BLD AUTO: 35.3 % (ref 40–54)
HCT VFR BLD AUTO: 35.3 % (ref 40–54)
HCT VFR BLD AUTO: 35.6 % (ref 40–54)
HCT VFR BLD AUTO: 35.8 % (ref 40–54)
HCT VFR BLD AUTO: 36.1 % (ref 40–54)
HCT VFR BLD AUTO: 36.4 % (ref 40–54)
HCT VFR BLD AUTO: 36.5 % (ref 40–54)
HCT VFR BLD AUTO: 36.7 % (ref 40–54)
HCT VFR BLD AUTO: 37.4 % (ref 40–54)
HGB BLD-MCNC: 11.1 G/DL (ref 14–18)
HGB BLD-MCNC: 11.2 G/DL (ref 14–18)
HGB BLD-MCNC: 11.8 G/DL (ref 14–18)
HGB BLD-MCNC: 11.9 G/DL (ref 14–18)
HGB BLD-MCNC: 12.2 G/DL (ref 14–18)
HGB BLD-MCNC: 12.2 G/DL (ref 14–18)
HGB BLD-MCNC: 12.3 G/DL (ref 14–18)
HGB BLD-MCNC: 12.4 G/DL (ref 14–18)
HGB BLD-MCNC: 12.5 G/DL (ref 14–18)
HGB UR QL STRIP: NEGATIVE
IMM GRANULOCYTES # BLD AUTO: 0.02 K/UL (ref 0–0.04)
IMM GRANULOCYTES # BLD AUTO: 0.04 K/UL (ref 0–0.04)
IMM GRANULOCYTES # BLD AUTO: 0.05 K/UL (ref 0–0.04)
IMM GRANULOCYTES # BLD AUTO: 0.09 K/UL (ref 0–0.04)
IMM GRANULOCYTES # BLD AUTO: 0.19 K/UL (ref 0–0.04)
IMM GRANULOCYTES # BLD AUTO: 0.22 K/UL (ref 0–0.04)
IMM GRANULOCYTES # BLD AUTO: 0.22 K/UL (ref 0–0.04)
IMM GRANULOCYTES # BLD AUTO: 0.27 K/UL (ref 0–0.04)
IMM GRANULOCYTES # BLD AUTO: 0.32 K/UL (ref 0–0.04)
IMM GRANULOCYTES # BLD AUTO: 0.33 K/UL (ref 0–0.04)
IMM GRANULOCYTES # BLD AUTO: 0.33 K/UL (ref 0–0.04)
IMM GRANULOCYTES NFR BLD AUTO: 0.2 % (ref 0–0.5)
IMM GRANULOCYTES NFR BLD AUTO: 0.3 % (ref 0–0.5)
IMM GRANULOCYTES NFR BLD AUTO: 0.5 % (ref 0–0.5)
IMM GRANULOCYTES NFR BLD AUTO: 0.5 % (ref 0–0.5)
IMM GRANULOCYTES NFR BLD AUTO: 0.7 % (ref 0–0.5)
IMM GRANULOCYTES NFR BLD AUTO: 0.9 % (ref 0–0.5)
IMM GRANULOCYTES NFR BLD AUTO: 1 % (ref 0–0.5)
IMM GRANULOCYTES NFR BLD AUTO: 1.1 % (ref 0–0.5)
IMM GRANULOCYTES NFR BLD AUTO: 1.2 % (ref 0–0.5)
IMM GRANULOCYTES NFR BLD AUTO: 1.3 % (ref 0–0.5)
KETONES UR QL STRIP: NEGATIVE
LEUKOCYTE ESTERASE UR QL STRIP: NEGATIVE
LYMPHOCYTES # BLD AUTO: 0.3 K/UL (ref 1–4.8)
LYMPHOCYTES # BLD AUTO: 0.4 K/UL (ref 1–4.8)
LYMPHOCYTES # BLD AUTO: 0.5 K/UL (ref 1–4.8)
LYMPHOCYTES # BLD AUTO: 0.6 K/UL (ref 1–4.8)
LYMPHOCYTES # BLD AUTO: 0.7 K/UL (ref 1–4.8)
LYMPHOCYTES # BLD AUTO: 1 K/UL (ref 1–4.8)
LYMPHOCYTES NFR BLD: 1.5 % (ref 18–48)
LYMPHOCYTES NFR BLD: 1.5 % (ref 18–48)
LYMPHOCYTES NFR BLD: 1.6 % (ref 18–48)
LYMPHOCYTES NFR BLD: 1.7 % (ref 18–48)
LYMPHOCYTES NFR BLD: 14.3 % (ref 18–48)
LYMPHOCYTES NFR BLD: 3.1 % (ref 18–48)
LYMPHOCYTES NFR BLD: 4 % (ref 18–48)
LYMPHOCYTES NFR BLD: 5.3 % (ref 18–48)
LYMPHOCYTES NFR BLD: 8.5 % (ref 18–48)
MAGNESIUM SERPL-MCNC: 1.8 MG/DL (ref 1.6–2.6)
MCH RBC QN AUTO: 28.8 PG (ref 27–31)
MCH RBC QN AUTO: 28.9 PG (ref 27–31)
MCH RBC QN AUTO: 28.9 PG (ref 27–31)
MCH RBC QN AUTO: 29 PG (ref 27–31)
MCH RBC QN AUTO: 29.1 PG (ref 27–31)
MCH RBC QN AUTO: 29.2 PG (ref 27–31)
MCH RBC QN AUTO: 29.4 PG (ref 27–31)
MCH RBC QN AUTO: 29.4 PG (ref 27–31)
MCH RBC QN AUTO: 29.5 PG (ref 27–31)
MCH RBC QN AUTO: 29.6 PG (ref 27–31)
MCH RBC QN AUTO: 30.7 PG (ref 27–31)
MCHC RBC AUTO-ENTMCNC: 32.7 G/DL (ref 32–36)
MCHC RBC AUTO-ENTMCNC: 33.1 G/DL (ref 32–36)
MCHC RBC AUTO-ENTMCNC: 33.2 G/DL (ref 32–36)
MCHC RBC AUTO-ENTMCNC: 33.3 G/DL (ref 32–36)
MCHC RBC AUTO-ENTMCNC: 33.4 G/DL (ref 32–36)
MCHC RBC AUTO-ENTMCNC: 33.5 G/DL (ref 32–36)
MCHC RBC AUTO-ENTMCNC: 33.8 G/DL (ref 32–36)
MCHC RBC AUTO-ENTMCNC: 34 G/DL (ref 32–36)
MCHC RBC AUTO-ENTMCNC: 34.1 G/DL (ref 32–36)
MCHC RBC AUTO-ENTMCNC: 34.6 G/DL (ref 32–36)
MCHC RBC AUTO-ENTMCNC: 34.6 G/DL (ref 32–36)
MCV RBC AUTO: 85 FL (ref 82–98)
MCV RBC AUTO: 86 FL (ref 82–98)
MCV RBC AUTO: 87 FL (ref 82–98)
MCV RBC AUTO: 88 FL (ref 82–98)
MCV RBC AUTO: 89 FL (ref 82–98)
MCV RBC AUTO: 89 FL (ref 82–98)
MCV RBC AUTO: 92 FL (ref 82–98)
MONOCYTES # BLD AUTO: 0.3 K/UL (ref 0.3–1)
MONOCYTES # BLD AUTO: 0.7 K/UL (ref 0.3–1)
MONOCYTES # BLD AUTO: 0.7 K/UL (ref 0.3–1)
MONOCYTES # BLD AUTO: 0.8 K/UL (ref 0.3–1)
MONOCYTES # BLD AUTO: 1 K/UL (ref 0.3–1)
MONOCYTES # BLD AUTO: 1.1 K/UL (ref 0.3–1)
MONOCYTES # BLD AUTO: 1.1 K/UL (ref 0.3–1)
MONOCYTES # BLD AUTO: 1.2 K/UL (ref 0.3–1)
MONOCYTES # BLD AUTO: 1.4 K/UL (ref 0.3–1)
MONOCYTES NFR BLD: 10 % (ref 4–15)
MONOCYTES NFR BLD: 10.3 % (ref 4–15)
MONOCYTES NFR BLD: 2.6 % (ref 4–15)
MONOCYTES NFR BLD: 3.2 % (ref 4–15)
MONOCYTES NFR BLD: 3.8 % (ref 4–15)
MONOCYTES NFR BLD: 3.9 % (ref 4–15)
MONOCYTES NFR BLD: 5 % (ref 4–15)
MONOCYTES NFR BLD: 5.1 % (ref 4–15)
MONOCYTES NFR BLD: 9.2 % (ref 4–15)
NEUTROPHILS # BLD AUTO: 12.1 K/UL (ref 1.8–7.7)
NEUTROPHILS # BLD AUTO: 20.3 K/UL (ref 1.8–7.7)
NEUTROPHILS # BLD AUTO: 20.3 K/UL (ref 1.8–7.7)
NEUTROPHILS # BLD AUTO: 20.5 K/UL (ref 1.8–7.7)
NEUTROPHILS # BLD AUTO: 21.8 K/UL (ref 1.8–7.7)
NEUTROPHILS # BLD AUTO: 25.5 K/UL (ref 1.8–7.7)
NEUTROPHILS # BLD AUTO: 26.1 K/UL (ref 1.8–7.7)
NEUTROPHILS # BLD AUTO: 26.8 K/UL (ref 1.8–7.7)
NEUTROPHILS # BLD AUTO: 4.9 K/UL (ref 1.8–7.7)
NEUTROPHILS # BLD AUTO: 6.6 K/UL (ref 1.8–7.7)
NEUTROPHILS # BLD AUTO: 6.8 K/UL (ref 1.8–7.7)
NEUTROPHILS # BLD AUTO: 8.7 K/UL (ref 1.8–7.7)
NEUTROPHILS NFR BLD: 71.6 % (ref 38–73)
NEUTROPHILS NFR BLD: 79.5 % (ref 38–73)
NEUTROPHILS NFR BLD: 84.1 % (ref 38–73)
NEUTROPHILS NFR BLD: 84.5 % (ref 38–73)
NEUTROPHILS NFR BLD: 91.8 % (ref 38–73)
NEUTROPHILS NFR BLD: 91.9 % (ref 38–73)
NEUTROPHILS NFR BLD: 93.1 % (ref 38–73)
NEUTROPHILS NFR BLD: 93.2 % (ref 38–73)
NEUTROPHILS NFR BLD: 93.5 % (ref 38–73)
NEUTROPHILS NFR BLD: 94 % (ref 38–73)
NITRITE UR QL STRIP: NEGATIVE
NRBC BLD-RTO: 0 /100 WBC
PH UR STRIP: 7 [PH] (ref 5–8)
PHOSPHATE SERPL-MCNC: 3.1 MG/DL (ref 2.7–4.5)
PLATELET # BLD AUTO: 286 K/UL (ref 150–450)
PLATELET # BLD AUTO: 346 K/UL (ref 150–450)
PLATELET # BLD AUTO: 354 K/UL (ref 150–450)
PLATELET # BLD AUTO: 356 K/UL (ref 150–450)
PLATELET # BLD AUTO: 356 K/UL (ref 150–450)
PLATELET # BLD AUTO: 367 K/UL (ref 150–450)
PLATELET # BLD AUTO: 372 K/UL (ref 150–450)
PLATELET # BLD AUTO: 377 K/UL (ref 150–450)
PLATELET # BLD AUTO: 381 K/UL (ref 150–450)
PLATELET # BLD AUTO: 395 K/UL (ref 150–450)
PLATELET # BLD AUTO: 400 K/UL (ref 150–450)
PLATELET # BLD AUTO: 400 K/UL (ref 150–450)
PMV BLD AUTO: 10.5 FL (ref 9.2–12.9)
PMV BLD AUTO: 8.9 FL (ref 9.2–12.9)
PMV BLD AUTO: 9.1 FL (ref 9.2–12.9)
PMV BLD AUTO: 9.2 FL (ref 9.2–12.9)
PMV BLD AUTO: 9.2 FL (ref 9.2–12.9)
PMV BLD AUTO: 9.3 FL (ref 9.2–12.9)
PMV BLD AUTO: 9.3 FL (ref 9.2–12.9)
PMV BLD AUTO: 9.5 FL (ref 9.2–12.9)
PMV BLD AUTO: 9.6 FL (ref 9.2–12.9)
PMV BLD AUTO: 9.6 FL (ref 9.2–12.9)
PMV BLD AUTO: 9.7 FL (ref 9.2–12.9)
POTASSIUM SERPL-SCNC: 3.5 MMOL/L (ref 3.5–5.1)
POTASSIUM SERPL-SCNC: 3.9 MMOL/L (ref 3.5–5.1)
POTASSIUM SERPL-SCNC: 3.9 MMOL/L (ref 3.5–5.1)
POTASSIUM SERPL-SCNC: 4 MMOL/L (ref 3.5–5.1)
POTASSIUM SERPL-SCNC: 4.1 MMOL/L (ref 3.5–5.1)
POTASSIUM SERPL-SCNC: 4.2 MMOL/L (ref 3.5–5.1)
POTASSIUM SERPL-SCNC: 4.2 MMOL/L (ref 3.5–5.1)
POTASSIUM SERPL-SCNC: 4.3 MMOL/L (ref 3.5–5.1)
POTASSIUM SERPL-SCNC: 4.5 MMOL/L (ref 3.5–5.1)
POTASSIUM SERPL-SCNC: 4.8 MMOL/L (ref 3.5–5.1)
POTASSIUM SERPL-SCNC: 5 MMOL/L (ref 3.5–5.1)
PROT SERPL-MCNC: 6.3 G/DL (ref 6–8.4)
PROT SERPL-MCNC: 6.5 G/DL (ref 6–8.4)
PROT SERPL-MCNC: 6.8 G/DL (ref 6–8.4)
PROT SERPL-MCNC: 6.9 G/DL (ref 6–8.4)
PROT SERPL-MCNC: 7.1 G/DL (ref 6–8.4)
PROT SERPL-MCNC: 7.1 G/DL (ref 6–8.4)
PROT SERPL-MCNC: 7.2 G/DL (ref 6–8.4)
PROT SERPL-MCNC: 7.4 G/DL (ref 6–8.4)
PROT SERPL-MCNC: 7.8 G/DL (ref 6–8.4)
PROT UR QL STRIP: NEGATIVE
RBC # BLD AUTO: 3.78 M/UL (ref 4.6–6.2)
RBC # BLD AUTO: 3.83 M/UL (ref 4.6–6.2)
RBC # BLD AUTO: 3.84 M/UL (ref 4.6–6.2)
RBC # BLD AUTO: 4 M/UL (ref 4.6–6.2)
RBC # BLD AUTO: 4.01 M/UL (ref 4.6–6.2)
RBC # BLD AUTO: 4.12 M/UL (ref 4.6–6.2)
RBC # BLD AUTO: 4.15 M/UL (ref 4.6–6.2)
RBC # BLD AUTO: 4.15 M/UL (ref 4.6–6.2)
RBC # BLD AUTO: 4.22 M/UL (ref 4.6–6.2)
RBC # BLD AUTO: 4.22 M/UL (ref 4.6–6.2)
RBC # BLD AUTO: 4.24 M/UL (ref 4.6–6.2)
RBC # BLD AUTO: 4.3 M/UL (ref 4.6–6.2)
SARS-COV-2 RDRP RESP QL NAA+PROBE: NEGATIVE
SODIUM SERPL-SCNC: 124 MMOL/L (ref 136–145)
SODIUM SERPL-SCNC: 126 MMOL/L (ref 136–145)
SODIUM SERPL-SCNC: 127 MMOL/L (ref 136–145)
SODIUM SERPL-SCNC: 129 MMOL/L (ref 136–145)
SODIUM SERPL-SCNC: 131 MMOL/L (ref 136–145)
SODIUM SERPL-SCNC: 134 MMOL/L (ref 136–145)
SP GR UR STRIP: 1.01 (ref 1–1.03)
TROPONIN I SERPL DL<=0.01 NG/ML-MCNC: 0.03 NG/ML
TROPONIN I SERPL DL<=0.01 NG/ML-MCNC: 0.03 NG/ML
TROPONIN I SERPL DL<=0.01 NG/ML-MCNC: 0.04 NG/ML
TSH SERPL DL<=0.005 MIU/L-ACNC: 2.74 UIU/ML (ref 0.34–5.6)
TSH SERPL DL<=0.005 MIU/L-ACNC: 2.96 UIU/ML (ref 0.34–5.6)
URN SPEC COLLECT METH UR: NORMAL
UROBILINOGEN UR STRIP-ACNC: NEGATIVE EU/DL
WBC # BLD AUTO: 10.3 K/UL (ref 3.9–12.7)
WBC # BLD AUTO: 12.9 K/UL (ref 3.9–12.7)
WBC # BLD AUTO: 21.69 K/UL (ref 3.9–12.7)
WBC # BLD AUTO: 21.69 K/UL (ref 3.9–12.7)
WBC # BLD AUTO: 21.76 K/UL (ref 3.9–12.7)
WBC # BLD AUTO: 23.76 K/UL (ref 3.9–12.7)
WBC # BLD AUTO: 27.77 K/UL (ref 3.9–12.7)
WBC # BLD AUTO: 27.96 K/UL (ref 3.9–12.7)
WBC # BLD AUTO: 28.75 K/UL (ref 3.9–12.7)
WBC # BLD AUTO: 6.78 K/UL (ref 3.9–12.7)
WBC # BLD AUTO: 8.09 K/UL (ref 3.9–12.7)
WBC # BLD AUTO: 8.24 K/UL (ref 3.9–12.7)

## 2022-01-01 PROCEDURE — 94640 AIRWAY INHALATION TREATMENT: CPT

## 2022-01-01 PROCEDURE — 25000003 PHARM REV CODE 250: Performed by: ORTHOPAEDIC SURGERY

## 2022-01-01 PROCEDURE — 99214 OFFICE O/P EST MOD 30 MIN: CPT | Mod: S$GLB,,, | Performed by: INTERNAL MEDICINE

## 2022-01-01 PROCEDURE — G6002 STEREOSCOPIC X-RAY GUIDANCE: HCPCS | Mod: S$GLB,,, | Performed by: RADIOLOGY

## 2022-01-01 PROCEDURE — 80053 COMPREHEN METABOLIC PANEL: CPT | Performed by: NURSE PRACTITIONER

## 2022-01-01 PROCEDURE — 36000710: Performed by: ORTHOPAEDIC SURGERY

## 2022-01-01 PROCEDURE — 36415 COLL VENOUS BLD VENIPUNCTURE: CPT | Performed by: NURSE PRACTITIONER

## 2022-01-01 PROCEDURE — 3078F PR MOST RECENT DIASTOLIC BLOOD PRESSURE < 80 MM HG: ICD-10-PCS | Mod: S$GLB,,, | Performed by: INTERNAL MEDICINE

## 2022-01-01 PROCEDURE — 3008F BODY MASS INDEX DOCD: CPT | Mod: S$GLB,,, | Performed by: INTERNAL MEDICINE

## 2022-01-01 PROCEDURE — 25000003 PHARM REV CODE 250: Performed by: NURSE PRACTITIONER

## 2022-01-01 PROCEDURE — 85025 COMPLETE CBC W/AUTO DIFF WBC: CPT | Performed by: NURSE PRACTITIONER

## 2022-01-01 PROCEDURE — 94760 N-INVAS EAR/PLS OXIMETRY 1: CPT

## 2022-01-01 PROCEDURE — 71046 X-RAY EXAM CHEST 2 VIEWS: CPT | Mod: TC,PO

## 2022-01-01 PROCEDURE — 36415 COLL VENOUS BLD VENIPUNCTURE: CPT | Performed by: INTERNAL MEDICINE

## 2022-01-01 PROCEDURE — 27000221 HC OXYGEN, UP TO 24 HOURS

## 2022-01-01 PROCEDURE — 84443 ASSAY THYROID STIM HORMONE: CPT | Performed by: INTERNAL MEDICINE

## 2022-01-01 PROCEDURE — 3077F SYST BP >= 140 MM HG: CPT | Mod: S$GLB,,, | Performed by: INTERNAL MEDICINE

## 2022-01-01 PROCEDURE — 3288F FALL RISK ASSESSMENT DOCD: CPT | Mod: S$GLB,,, | Performed by: INTERNAL MEDICINE

## 2022-01-01 PROCEDURE — 94761 N-INVAS EAR/PLS OXIMETRY MLT: CPT

## 2022-01-01 PROCEDURE — 99900031 HC PATIENT EDUCATION (STAT)

## 2022-01-01 PROCEDURE — 25000003 PHARM REV CODE 250: Performed by: INTERNAL MEDICINE

## 2022-01-01 PROCEDURE — 3008F PR BODY MASS INDEX (BMI) DOCUMENTED: ICD-10-PCS | Mod: S$GLB,,, | Performed by: INTERNAL MEDICINE

## 2022-01-01 PROCEDURE — 73060 X-RAY EXAM OF HUMERUS: CPT | Mod: TC,PN,RT

## 2022-01-01 PROCEDURE — 3074F PR MOST RECENT SYSTOLIC BLOOD PRESSURE < 130 MM HG: ICD-10-PCS | Mod: S$GLB,,, | Performed by: INTERNAL MEDICINE

## 2022-01-01 PROCEDURE — 3288F FALL RISK ASSESSMENT DOCD: CPT | Mod: CPTII,S$GLB,, | Performed by: ORTHOPAEDIC SURGERY

## 2022-01-01 PROCEDURE — 99900035 HC TECH TIME PER 15 MIN (STAT)

## 2022-01-01 PROCEDURE — 3074F SYST BP LT 130 MM HG: CPT | Mod: S$GLB,,, | Performed by: INTERNAL MEDICINE

## 2022-01-01 PROCEDURE — 63600175 PHARM REV CODE 636 W HCPCS: Performed by: ANESTHESIOLOGY

## 2022-01-01 PROCEDURE — 3078F DIAST BP <80 MM HG: CPT | Mod: S$GLB,,, | Performed by: INTERNAL MEDICINE

## 2022-01-01 PROCEDURE — U0002 COVID-19 LAB TEST NON-CDC: HCPCS | Performed by: STUDENT IN AN ORGANIZED HEALTH CARE EDUCATION/TRAINING PROGRAM

## 2022-01-01 PROCEDURE — 3077F PR MOST RECENT SYSTOLIC BLOOD PRESSURE >= 140 MM HG: ICD-10-PCS | Mod: S$GLB,,, | Performed by: INTERNAL MEDICINE

## 2022-01-01 PROCEDURE — 1101F PT FALLS ASSESS-DOCD LE1/YR: CPT | Mod: S$GLB,,, | Performed by: INTERNAL MEDICINE

## 2022-01-01 PROCEDURE — 1125F PR PAIN SEVERITY QUANTIFIED, PAIN PRESENT: ICD-10-PCS | Mod: S$GLB,,, | Performed by: INTERNAL MEDICINE

## 2022-01-01 PROCEDURE — 37000009 HC ANESTHESIA EA ADD 15 MINS: Performed by: ORTHOPAEDIC SURGERY

## 2022-01-01 PROCEDURE — 12000002 HC ACUTE/MED SURGE SEMI-PRIVATE ROOM

## 2022-01-01 PROCEDURE — 84484 ASSAY OF TROPONIN QUANT: CPT | Mod: 91 | Performed by: STUDENT IN AN ORGANIZED HEALTH CARE EDUCATION/TRAINING PROGRAM

## 2022-01-01 PROCEDURE — 25000003 PHARM REV CODE 250: Performed by: NURSE ANESTHETIST, CERTIFIED REGISTERED

## 2022-01-01 PROCEDURE — 70260 X-RAY EXAM OF SKULL: CPT | Mod: TC

## 2022-01-01 PROCEDURE — 63600175 PHARM REV CODE 636 W HCPCS: Performed by: ORTHOPAEDIC SURGERY

## 2022-01-01 PROCEDURE — 99213 PR OFFICE/OUTPT VISIT, EST, LEVL III, 20-29 MIN: ICD-10-PCS | Mod: S$GLB,,, | Performed by: INTERNAL MEDICINE

## 2022-01-01 PROCEDURE — 3075F PR MOST RECENT SYSTOLIC BLOOD PRESS GE 130-139MM HG: ICD-10-PCS | Mod: S$GLB,,, | Performed by: INTERNAL MEDICINE

## 2022-01-01 PROCEDURE — 77336 PR  RADN PHYSICS CONSULT CONTINUING: ICD-10-PCS | Mod: S$GLB,,, | Performed by: RADIOLOGY

## 2022-01-01 PROCEDURE — 3008F BODY MASS INDEX DOCD: CPT | Mod: CPTII,S$GLB,, | Performed by: ORTHOPAEDIC SURGERY

## 2022-01-01 PROCEDURE — 27202107 HC XP QUATRO SENSOR: Performed by: ANESTHESIOLOGY

## 2022-01-01 PROCEDURE — 1101F PR PT FALLS ASSESS DOC 0-1 FALLS W/OUT INJ PAST YR: ICD-10-PCS | Mod: S$GLB,,, | Performed by: INTERNAL MEDICINE

## 2022-01-01 PROCEDURE — 3288F PR FALLS RISK ASSESSMENT DOCUMENTED: ICD-10-PCS | Mod: S$GLB,,, | Performed by: INTERNAL MEDICINE

## 2022-01-01 PROCEDURE — 1159F MED LIST DOCD IN RCRD: CPT | Mod: CPTII,S$GLB,, | Performed by: ORTHOPAEDIC SURGERY

## 2022-01-01 PROCEDURE — 1159F PR MEDICATION LIST DOCUMENTED IN MEDICAL RECORD: ICD-10-PCS | Mod: CPTII,S$GLB,, | Performed by: ORTHOPAEDIC SURGERY

## 2022-01-01 PROCEDURE — 63600175 PHARM REV CODE 636 W HCPCS: Performed by: INTERNAL MEDICINE

## 2022-01-01 PROCEDURE — 25000003 PHARM REV CODE 250: Performed by: ANESTHESIOLOGY

## 2022-01-01 PROCEDURE — 71000039 HC RECOVERY, EACH ADD'L HOUR: Performed by: ORTHOPAEDIC SURGERY

## 2022-01-01 PROCEDURE — 36415 COLL VENOUS BLD VENIPUNCTURE: CPT | Performed by: STUDENT IN AN ORGANIZED HEALTH CARE EDUCATION/TRAINING PROGRAM

## 2022-01-01 PROCEDURE — 99203 OFFICE O/P NEW LOW 30 MIN: CPT | Mod: S$GLB,,, | Performed by: ORTHOPAEDIC SURGERY

## 2022-01-01 PROCEDURE — 25000242 PHARM REV CODE 250 ALT 637 W/ HCPCS: Performed by: NURSE PRACTITIONER

## 2022-01-01 PROCEDURE — 1101F PR PT FALLS ASSESS DOC 0-1 FALLS W/OUT INJ PAST YR: ICD-10-PCS | Mod: CPTII,S$GLB,, | Performed by: ORTHOPAEDIC SURGERY

## 2022-01-01 PROCEDURE — 27000080 OPTIME MED/SURG SUP & DEVICES GENERAL CLASSIFICATION: Performed by: ORTHOPAEDIC SURGERY

## 2022-01-01 PROCEDURE — 84484 ASSAY OF TROPONIN QUANT: CPT | Performed by: NURSE PRACTITIONER

## 2022-01-01 PROCEDURE — 25000003 PHARM REV CODE 250: Performed by: EMERGENCY MEDICINE

## 2022-01-01 PROCEDURE — 84100 ASSAY OF PHOSPHORUS: CPT | Performed by: INTERNAL MEDICINE

## 2022-01-01 PROCEDURE — 1125F AMNT PAIN NOTED PAIN PRSNT: CPT | Mod: S$GLB,,, | Performed by: INTERNAL MEDICINE

## 2022-01-01 PROCEDURE — 71000033 HC RECOVERY, INTIAL HOUR: Performed by: ORTHOPAEDIC SURGERY

## 2022-01-01 PROCEDURE — 4010F ACE/ARB THERAPY RXD/TAKEN: CPT | Mod: CPTII,S$GLB,, | Performed by: ORTHOPAEDIC SURGERY

## 2022-01-01 PROCEDURE — 1159F PR MEDICATION LIST DOCUMENTED IN MEDICAL RECORD: ICD-10-PCS | Mod: S$GLB,,, | Performed by: INTERNAL MEDICINE

## 2022-01-01 PROCEDURE — 97162 PT EVAL MOD COMPLEX 30 MIN: CPT

## 2022-01-01 PROCEDURE — 85025 COMPLETE CBC W/AUTO DIFF WBC: CPT | Performed by: INTERNAL MEDICINE

## 2022-01-01 PROCEDURE — 99285 EMERGENCY DEPT VISIT HI MDM: CPT | Mod: 25

## 2022-01-01 PROCEDURE — 99233 PR SUBSEQUENT HOSPITAL CARE,LEVL III: ICD-10-PCS | Mod: ,,, | Performed by: INTERNAL MEDICINE

## 2022-01-01 PROCEDURE — 1159F MED LIST DOCD IN RCRD: CPT | Mod: S$GLB,,, | Performed by: INTERNAL MEDICINE

## 2022-01-01 PROCEDURE — 96413 CHEMO IV INFUSION 1 HR: CPT

## 2022-01-01 PROCEDURE — 99213 OFFICE O/P EST LOW 20 MIN: CPT | Mod: S$GLB,,, | Performed by: INTERNAL MEDICINE

## 2022-01-01 PROCEDURE — 86901 BLOOD TYPING SEROLOGIC RH(D): CPT | Performed by: STUDENT IN AN ORGANIZED HEALTH CARE EDUCATION/TRAINING PROGRAM

## 2022-01-01 PROCEDURE — 4010F ACE/ARB THERAPY RXD/TAKEN: CPT | Mod: S$GLB,,, | Performed by: INTERNAL MEDICINE

## 2022-01-01 PROCEDURE — 1125F AMNT PAIN NOTED PAIN PRSNT: CPT | Mod: CPTII,S$GLB,, | Performed by: ORTHOPAEDIC SURGERY

## 2022-01-01 PROCEDURE — 93005 ELECTROCARDIOGRAM TRACING: CPT | Performed by: INTERNAL MEDICINE

## 2022-01-01 PROCEDURE — G6002 PR STEREOSCOPIC XRAY GUIDE FOR RADIATION TX DELIV: ICD-10-PCS | Mod: S$GLB,,, | Performed by: RADIOLOGY

## 2022-01-01 PROCEDURE — 63600175 PHARM REV CODE 636 W HCPCS: Mod: JG | Performed by: INTERNAL MEDICINE

## 2022-01-01 PROCEDURE — 99283 EMERGENCY DEPT VISIT LOW MDM: CPT

## 2022-01-01 PROCEDURE — 94799 UNLISTED PULMONARY SVC/PX: CPT

## 2022-01-01 PROCEDURE — 24516 TX HUMRL SHAFT FX IMED IMPLT: CPT | Mod: RT,,, | Performed by: ORTHOPAEDIC SURGERY

## 2022-01-01 PROCEDURE — 3075F SYST BP GE 130 - 139MM HG: CPT | Mod: S$GLB,,, | Performed by: INTERNAL MEDICINE

## 2022-01-01 PROCEDURE — 80053 COMPREHEN METABOLIC PANEL: CPT | Performed by: INTERNAL MEDICINE

## 2022-01-01 PROCEDURE — 94618 PULMONARY STRESS TESTING: CPT

## 2022-01-01 PROCEDURE — C1713 ANCHOR/SCREW BN/BN,TIS/BN: HCPCS | Performed by: ORTHOPAEDIC SURGERY

## 2022-01-01 PROCEDURE — 83735 ASSAY OF MAGNESIUM: CPT | Performed by: INTERNAL MEDICINE

## 2022-01-01 PROCEDURE — 73060 X-RAY EXAM OF HUMERUS: CPT | Mod: 26,RT,, | Performed by: RADIOLOGY

## 2022-01-01 PROCEDURE — 81003 URINALYSIS AUTO W/O SCOPE: CPT | Performed by: STUDENT IN AN ORGANIZED HEALTH CARE EDUCATION/TRAINING PROGRAM

## 2022-01-01 PROCEDURE — 99214 PR OFFICE/OUTPT VISIT, EST, LEVL IV, 30-39 MIN: ICD-10-PCS | Mod: S$GLB,,, | Performed by: RADIOLOGY

## 2022-01-01 PROCEDURE — 27000671 HC TUBING MICROBORE EXT: Performed by: ANESTHESIOLOGY

## 2022-01-01 PROCEDURE — 63600175 PHARM REV CODE 636 W HCPCS: Performed by: NURSE ANESTHETIST, CERTIFIED REGISTERED

## 2022-01-01 PROCEDURE — 71100 X-RAY EXAM RIBS UNI 2 VIEWS: CPT | Mod: TC,PO,LT

## 2022-01-01 PROCEDURE — 4010F PR ACE/ARB THEARPY RXD/TAKEN: ICD-10-PCS | Mod: S$GLB,,, | Performed by: INTERNAL MEDICINE

## 2022-01-01 PROCEDURE — 99203 PR OFFICE/OUTPT VISIT, NEW, LEVL III, 30-44 MIN: ICD-10-PCS | Mod: S$GLB,,, | Performed by: ORTHOPAEDIC SURGERY

## 2022-01-01 PROCEDURE — 80048 BASIC METABOLIC PNL TOTAL CA: CPT | Performed by: NURSE PRACTITIONER

## 2022-01-01 PROCEDURE — 99232 SBSQ HOSP IP/OBS MODERATE 35: CPT | Mod: ,,, | Performed by: INTERNAL MEDICINE

## 2022-01-01 PROCEDURE — 1101F PT FALLS ASSESS-DOCD LE1/YR: CPT | Mod: CPTII,S$GLB,, | Performed by: ORTHOPAEDIC SURGERY

## 2022-01-01 PROCEDURE — 85025 COMPLETE CBC W/AUTO DIFF WBC: CPT | Performed by: STUDENT IN AN ORGANIZED HEALTH CARE EDUCATION/TRAINING PROGRAM

## 2022-01-01 PROCEDURE — 25000003 PHARM REV CODE 250: Performed by: STUDENT IN AN ORGANIZED HEALTH CARE EDUCATION/TRAINING PROGRAM

## 2022-01-01 PROCEDURE — 99223 1ST HOSP IP/OBS HIGH 75: CPT | Mod: ,,, | Performed by: ORTHOPAEDIC SURGERY

## 2022-01-01 PROCEDURE — 99214 PR OFFICE/OUTPT VISIT, EST, LEVL IV, 30-39 MIN: ICD-10-PCS | Mod: S$GLB,,, | Performed by: INTERNAL MEDICINE

## 2022-01-01 PROCEDURE — 97530 THERAPEUTIC ACTIVITIES: CPT

## 2022-01-01 PROCEDURE — 86900 BLOOD TYPING SEROLOGIC ABO: CPT | Performed by: STUDENT IN AN ORGANIZED HEALTH CARE EDUCATION/TRAINING PROGRAM

## 2022-01-01 PROCEDURE — 93010 EKG 12-LEAD: ICD-10-PCS | Mod: ,,, | Performed by: INTERNAL MEDICINE

## 2022-01-01 PROCEDURE — 99999 PR PBB SHADOW E&M-EST. PATIENT-LVL III: CPT | Mod: PBBFAC,,, | Performed by: ORTHOPAEDIC SURGERY

## 2022-01-01 PROCEDURE — 27201423 OPTIME MED/SURG SUP & DEVICES STERILE SUPPLY: Performed by: ORTHOPAEDIC SURGERY

## 2022-01-01 PROCEDURE — 99233 SBSQ HOSP IP/OBS HIGH 50: CPT | Mod: ,,, | Performed by: INTERNAL MEDICINE

## 2022-01-01 PROCEDURE — 93010 ELECTROCARDIOGRAM REPORT: CPT | Mod: ,,, | Performed by: INTERNAL MEDICINE

## 2022-01-01 PROCEDURE — 27000656 HC EYE GOGGLES: Performed by: ANESTHESIOLOGY

## 2022-01-01 PROCEDURE — 77336 RADIATION PHYSICS CONSULT: CPT | Mod: S$GLB,,, | Performed by: RADIOLOGY

## 2022-01-01 PROCEDURE — 99231 SBSQ HOSP IP/OBS SF/LOW 25: CPT | Mod: ,,, | Performed by: INTERNAL MEDICINE

## 2022-01-01 PROCEDURE — 71275 CT ANGIOGRAPHY CHEST: CPT | Mod: TC,PO

## 2022-01-01 PROCEDURE — G6014 PR RADN TX DELIVERY,  >= 20 MEV, >= 3 TX AREAS: ICD-10-PCS | Mod: S$GLB,,, | Performed by: RADIOLOGY

## 2022-01-01 PROCEDURE — 99999 PR PBB SHADOW E&M-EST. PATIENT-LVL III: ICD-10-PCS | Mod: PBBFAC,,, | Performed by: ORTHOPAEDIC SURGERY

## 2022-01-01 PROCEDURE — 63600175 PHARM REV CODE 636 W HCPCS: Performed by: STUDENT IN AN ORGANIZED HEALTH CARE EDUCATION/TRAINING PROGRAM

## 2022-01-01 PROCEDURE — 3288F PR FALLS RISK ASSESSMENT DOCUMENTED: ICD-10-PCS | Mod: CPTII,S$GLB,, | Performed by: ORTHOPAEDIC SURGERY

## 2022-01-01 PROCEDURE — 73060 XR HUMERUS 2 VIEW RIGHT: ICD-10-PCS | Mod: 26,RT,, | Performed by: RADIOLOGY

## 2022-01-01 PROCEDURE — A4216 STERILE WATER/SALINE, 10 ML: HCPCS | Performed by: ORTHOPAEDIC SURGERY

## 2022-01-01 PROCEDURE — 3008F PR BODY MASS INDEX (BMI) DOCUMENTED: ICD-10-PCS | Mod: CPTII,S$GLB,, | Performed by: ORTHOPAEDIC SURGERY

## 2022-01-01 PROCEDURE — 4010F PR ACE/ARB THEARPY RXD/TAKEN: ICD-10-PCS | Mod: CPTII,S$GLB,, | Performed by: ORTHOPAEDIC SURGERY

## 2022-01-01 PROCEDURE — 99223 PR INITIAL HOSPITAL CARE,LEVL III: ICD-10-PCS | Mod: ,,, | Performed by: ORTHOPAEDIC SURGERY

## 2022-01-01 PROCEDURE — 27201107 HC STYLET, STANDARD: Performed by: ANESTHESIOLOGY

## 2022-01-01 PROCEDURE — 27000673 HC TUBING BLOOD Y: Performed by: ANESTHESIOLOGY

## 2022-01-01 PROCEDURE — 99214 OFFICE O/P EST MOD 30 MIN: CPT | Mod: S$GLB,,, | Performed by: RADIOLOGY

## 2022-01-01 PROCEDURE — 36000711: Performed by: ORTHOPAEDIC SURGERY

## 2022-01-01 PROCEDURE — 99232 PR SUBSEQUENT HOSPITAL CARE,LEVL II: ICD-10-PCS | Mod: ,,, | Performed by: INTERNAL MEDICINE

## 2022-01-01 PROCEDURE — 37000008 HC ANESTHESIA 1ST 15 MINUTES: Performed by: ORTHOPAEDIC SURGERY

## 2022-01-01 PROCEDURE — 1125F PR PAIN SEVERITY QUANTIFIED, PAIN PRESENT: ICD-10-PCS | Mod: CPTII,S$GLB,, | Performed by: ORTHOPAEDIC SURGERY

## 2022-01-01 PROCEDURE — 25500020 PHARM REV CODE 255: Mod: PO | Performed by: INTERNAL MEDICINE

## 2022-01-01 PROCEDURE — 71046 X-RAY EXAM CHEST 2 VIEWS: CPT | Mod: TC

## 2022-01-01 PROCEDURE — 80053 COMPREHEN METABOLIC PANEL: CPT | Performed by: STUDENT IN AN ORGANIZED HEALTH CARE EDUCATION/TRAINING PROGRAM

## 2022-01-01 PROCEDURE — G6014 RADIATION TREATMENT DELIVERY: HCPCS | Mod: S$GLB,,, | Performed by: RADIOLOGY

## 2022-01-01 PROCEDURE — 99231 PR SUBSEQUENT HOSPITAL CARE,LEVL I: ICD-10-PCS | Mod: ,,, | Performed by: INTERNAL MEDICINE

## 2022-01-01 PROCEDURE — 24516 PR OPEN ROD FIXATN HUMERAL SHAFT FX: ICD-10-PCS | Mod: RT,,, | Performed by: ORTHOPAEDIC SURGERY

## 2022-01-01 DEVICE — IMPLANTABLE DEVICE: Type: IMPLANTABLE DEVICE | Site: HUMERUS | Status: FUNCTIONAL

## 2022-01-01 RX ORDER — BENAZEPRIL HYDROCHLORIDE 20 MG/1
40 TABLET ORAL DAILY
Status: DISCONTINUED | OUTPATIENT
Start: 2022-01-01 | End: 2022-01-01

## 2022-01-01 RX ORDER — SODIUM CHLORIDE 9 MG/ML
INJECTION, SOLUTION INTRAVENOUS CONTINUOUS
Status: DISCONTINUED | OUTPATIENT
Start: 2022-01-01 | End: 2022-01-01

## 2022-01-01 RX ORDER — CLINDAMYCIN PHOSPHATE 900 MG/50ML
900 INJECTION, SOLUTION INTRAVENOUS
Status: CANCELLED | OUTPATIENT
Start: 2022-01-01

## 2022-01-01 RX ORDER — DEXAMETHASONE SODIUM PHOSPHATE 4 MG/ML
6 INJECTION, SOLUTION INTRA-ARTICULAR; INTRALESIONAL; INTRAMUSCULAR; INTRAVENOUS; SOFT TISSUE EVERY 4 HOURS
Status: DISCONTINUED | OUTPATIENT
Start: 2022-01-01 | End: 2022-01-01 | Stop reason: HOSPADM

## 2022-01-01 RX ORDER — LIDOCAINE HCL/PF 100 MG/5ML
SYRINGE (ML) INTRAVENOUS
Status: DISCONTINUED | OUTPATIENT
Start: 2022-01-01 | End: 2022-01-01

## 2022-01-01 RX ORDER — ONDANSETRON 2 MG/ML
INJECTION INTRAMUSCULAR; INTRAVENOUS
Status: DISCONTINUED | OUTPATIENT
Start: 2022-01-01 | End: 2022-01-01

## 2022-01-01 RX ORDER — HEPARIN 100 UNIT/ML
500 SYRINGE INTRAVENOUS
Status: CANCELLED | OUTPATIENT
Start: 2022-03-23

## 2022-01-01 RX ORDER — HYDROMORPHONE HYDROCHLORIDE 1 MG/ML
1 INJECTION, SOLUTION INTRAMUSCULAR; INTRAVENOUS; SUBCUTANEOUS EVERY 4 HOURS PRN
Status: DISCONTINUED | OUTPATIENT
Start: 2022-01-01 | End: 2022-01-01 | Stop reason: HOSPADM

## 2022-01-01 RX ORDER — SODIUM CHLORIDE 0.9 % (FLUSH) 0.9 %
10 SYRINGE (ML) INJECTION
Status: DISCONTINUED | OUTPATIENT
Start: 2022-01-01 | End: 2022-01-01 | Stop reason: HOSPADM

## 2022-01-01 RX ORDER — FENTANYL CITRATE 50 UG/ML
INJECTION, SOLUTION INTRAMUSCULAR; INTRAVENOUS
Status: DISCONTINUED | OUTPATIENT
Start: 2022-01-01 | End: 2022-01-01

## 2022-01-01 RX ORDER — AMLODIPINE BESYLATE 5 MG/1
5 TABLET ORAL DAILY
Status: DISCONTINUED | OUTPATIENT
Start: 2022-01-01 | End: 2022-01-01

## 2022-01-01 RX ORDER — CEFAZOLIN SODIUM 1 G/3ML
INJECTION, POWDER, FOR SOLUTION INTRAMUSCULAR; INTRAVENOUS
Status: DISCONTINUED | OUTPATIENT
Start: 2022-01-01 | End: 2022-01-01

## 2022-01-01 RX ORDER — HYDROCODONE BITARTRATE AND ACETAMINOPHEN 5; 325 MG/1; MG/1
1 TABLET ORAL
Status: COMPLETED | OUTPATIENT
Start: 2022-01-01 | End: 2022-01-01

## 2022-01-01 RX ORDER — PROPOFOL 10 MG/ML
VIAL (ML) INTRAVENOUS
Status: DISCONTINUED | OUTPATIENT
Start: 2022-01-01 | End: 2022-01-01

## 2022-01-01 RX ORDER — TALC
6 POWDER (GRAM) TOPICAL NIGHTLY PRN
Status: DISCONTINUED | OUTPATIENT
Start: 2022-01-01 | End: 2022-01-01 | Stop reason: HOSPADM

## 2022-01-01 RX ORDER — OXYCODONE HYDROCHLORIDE 5 MG/1
5 TABLET ORAL
Status: COMPLETED | OUTPATIENT
Start: 2022-01-01 | End: 2022-01-01

## 2022-01-01 RX ORDER — SUCCINYLCHOLINE CHLORIDE 20 MG/ML
INJECTION INTRAMUSCULAR; INTRAVENOUS
Status: DISCONTINUED | OUTPATIENT
Start: 2022-01-01 | End: 2022-01-01

## 2022-01-01 RX ORDER — SODIUM CHLORIDE 0.9 % (FLUSH) 0.9 %
10 SYRINGE (ML) INJECTION
Status: CANCELLED | OUTPATIENT
Start: 2022-03-23

## 2022-01-01 RX ORDER — LEVETIRACETAM 5 MG/ML
500 INJECTION INTRAVASCULAR EVERY 12 HOURS
Status: DISCONTINUED | OUTPATIENT
Start: 2022-01-01 | End: 2022-01-01

## 2022-01-01 RX ORDER — ACETAMINOPHEN 10 MG/ML
INJECTION, SOLUTION INTRAVENOUS
Status: DISCONTINUED | OUTPATIENT
Start: 2022-01-01 | End: 2022-01-01

## 2022-01-01 RX ORDER — BENAZEPRIL HYDROCHLORIDE 20 MG/1
40 TABLET ORAL DAILY
Status: DISCONTINUED | OUTPATIENT
Start: 2022-01-01 | End: 2022-01-01 | Stop reason: HOSPADM

## 2022-01-01 RX ORDER — IPRATROPIUM BROMIDE AND ALBUTEROL SULFATE 2.5; .5 MG/3ML; MG/3ML
3 SOLUTION RESPIRATORY (INHALATION) EVERY 6 HOURS
Status: DISCONTINUED | OUTPATIENT
Start: 2022-01-01 | End: 2022-01-01

## 2022-01-01 RX ORDER — SODIUM CHLORIDE 0.9 % (FLUSH) 0.9 %
2 SYRINGE (ML) INJECTION EVERY 6 HOURS
Status: DISCONTINUED | OUTPATIENT
Start: 2022-01-01 | End: 2022-01-01

## 2022-01-01 RX ORDER — AMLODIPINE BESYLATE 5 MG/1
10 TABLET ORAL DAILY
Status: DISCONTINUED | OUTPATIENT
Start: 2022-01-01 | End: 2022-01-01

## 2022-01-01 RX ORDER — METOPROLOL SUCCINATE 50 MG/1
100 TABLET, EXTENDED RELEASE ORAL DAILY
Status: DISCONTINUED | OUTPATIENT
Start: 2022-01-01 | End: 2022-01-01 | Stop reason: HOSPADM

## 2022-01-01 RX ORDER — AMOXICILLIN 250 MG
1 CAPSULE ORAL 2 TIMES DAILY
Status: DISCONTINUED | OUTPATIENT
Start: 2022-01-01 | End: 2022-01-01 | Stop reason: HOSPADM

## 2022-01-01 RX ORDER — LOPERAMIDE HYDROCHLORIDE 2 MG/1
2 CAPSULE ORAL CONTINUOUS PRN
Status: DISCONTINUED | OUTPATIENT
Start: 2022-01-01 | End: 2022-01-01 | Stop reason: HOSPADM

## 2022-01-01 RX ORDER — SODIUM CHLORIDE 9 MG/ML
1000 INJECTION, SOLUTION INTRAVENOUS
Status: COMPLETED | OUTPATIENT
Start: 2022-01-01 | End: 2022-01-01

## 2022-01-01 RX ORDER — AMLODIPINE AND BENAZEPRIL HYDROCHLORIDE 5; 40 MG/1; MG/1
1 CAPSULE ORAL DAILY
Status: DISCONTINUED | OUTPATIENT
Start: 2022-01-01 | End: 2022-01-01 | Stop reason: CLARIF

## 2022-01-01 RX ORDER — MEPERIDINE HYDROCHLORIDE 50 MG/ML
12.5 INJECTION INTRAMUSCULAR; INTRAVENOUS; SUBCUTANEOUS EVERY 10 MIN PRN
Status: DISCONTINUED | OUTPATIENT
Start: 2022-01-01 | End: 2022-01-01 | Stop reason: HOSPADM

## 2022-01-01 RX ORDER — OXYCODONE AND ACETAMINOPHEN 5; 325 MG/1; MG/1
1 TABLET ORAL EVERY 6 HOURS PRN
Qty: 30 TABLET | Refills: 0 | Status: SHIPPED | OUTPATIENT
Start: 2022-01-01

## 2022-01-01 RX ORDER — ONDANSETRON 2 MG/ML
4 INJECTION INTRAMUSCULAR; INTRAVENOUS EVERY 8 HOURS PRN
Status: DISCONTINUED | OUTPATIENT
Start: 2022-01-01 | End: 2022-01-01

## 2022-01-01 RX ORDER — HYDROMORPHONE HYDROCHLORIDE 1 MG/ML
0.2 INJECTION, SOLUTION INTRAMUSCULAR; INTRAVENOUS; SUBCUTANEOUS EVERY 5 MIN PRN
Status: DISCONTINUED | OUTPATIENT
Start: 2022-01-01 | End: 2022-01-01 | Stop reason: HOSPADM

## 2022-01-01 RX ORDER — HEPARIN 100 UNIT/ML
500 SYRINGE INTRAVENOUS
Status: CANCELLED | OUTPATIENT
Start: 2022-01-01

## 2022-01-01 RX ORDER — DIPHENHYDRAMINE HYDROCHLORIDE 50 MG/ML
25 INJECTION INTRAMUSCULAR; INTRAVENOUS ONCE
Status: CANCELLED | OUTPATIENT
Start: 2022-01-01 | End: 2022-01-01

## 2022-01-01 RX ORDER — AMLODIPINE BESYLATE 5 MG/1
5 TABLET ORAL DAILY
Status: DISCONTINUED | OUTPATIENT
Start: 2022-01-01 | End: 2022-01-01 | Stop reason: HOSPADM

## 2022-01-01 RX ORDER — DEXAMETHASONE 6 MG/1
6 TABLET ORAL EVERY 6 HOURS
Qty: 90 TABLET | Refills: 0 | Status: SHIPPED | OUTPATIENT
Start: 2022-01-01 | End: 2022-04-06

## 2022-01-01 RX ORDER — PHENYLEPHRINE HYDROCHLORIDE 10 MG/ML
INJECTION INTRAVENOUS
Status: DISCONTINUED | OUTPATIENT
Start: 2022-01-01 | End: 2022-01-01

## 2022-01-01 RX ORDER — HYDROCODONE BITARTRATE AND ACETAMINOPHEN 5; 325 MG/1; MG/1
2 TABLET ORAL EVERY 6 HOURS PRN
Qty: 120 TABLET | Refills: 0 | Status: SHIPPED | OUTPATIENT
Start: 2022-01-01 | End: 2022-01-01

## 2022-01-01 RX ORDER — ONDANSETRON 2 MG/ML
4 INJECTION INTRAMUSCULAR; INTRAVENOUS DAILY PRN
Status: DISCONTINUED | OUTPATIENT
Start: 2022-01-01 | End: 2022-01-01 | Stop reason: HOSPADM

## 2022-01-01 RX ORDER — DIPHENHYDRAMINE HYDROCHLORIDE 50 MG/ML
25 INJECTION INTRAMUSCULAR; INTRAVENOUS ONCE
Status: CANCELLED | OUTPATIENT
Start: 2022-03-23 | End: 2022-03-23

## 2022-01-01 RX ORDER — BENZONATATE 100 MG/1
100 CAPSULE ORAL 3 TIMES DAILY PRN
Status: DISCONTINUED | OUTPATIENT
Start: 2022-01-01 | End: 2022-01-01 | Stop reason: HOSPADM

## 2022-01-01 RX ORDER — ASPIRIN 81 MG/1
81 TABLET ORAL DAILY
Status: DISCONTINUED | OUTPATIENT
Start: 2022-01-01 | End: 2022-01-01 | Stop reason: HOSPADM

## 2022-01-01 RX ORDER — DOXYCYCLINE 100 MG/1
100 CAPSULE ORAL EVERY 12 HOURS
Status: DISCONTINUED | OUTPATIENT
Start: 2022-01-01 | End: 2022-01-01 | Stop reason: HOSPADM

## 2022-01-01 RX ORDER — LEVETIRACETAM 500 MG/5ML
500 INJECTION, SOLUTION, CONCENTRATE INTRAVENOUS
Status: DISCONTINUED | OUTPATIENT
Start: 2022-01-01 | End: 2022-01-01

## 2022-01-01 RX ORDER — AMLODIPINE BESYLATE 5 MG/1
5 TABLET ORAL ONCE
Status: COMPLETED | OUTPATIENT
Start: 2022-01-01 | End: 2022-01-01

## 2022-01-01 RX ORDER — HYDROCODONE BITARTRATE AND ACETAMINOPHEN 5; 325 MG/1; MG/1
1 TABLET ORAL EVERY 4 HOURS PRN
Qty: 12 TABLET | Refills: 0 | Status: SHIPPED | OUTPATIENT
Start: 2022-01-01

## 2022-01-01 RX ORDER — AMLODIPINE BESYLATE 5 MG/1
5 TABLET ORAL DAILY
Qty: 60 TABLET | Refills: 0 | Status: SHIPPED | OUTPATIENT
Start: 2022-01-01 | End: 2022-05-14

## 2022-01-01 RX ORDER — IPRATROPIUM BROMIDE AND ALBUTEROL SULFATE 2.5; .5 MG/3ML; MG/3ML
3 SOLUTION RESPIRATORY (INHALATION) EVERY 6 HOURS PRN
Status: DISCONTINUED | OUTPATIENT
Start: 2022-01-01 | End: 2022-01-01 | Stop reason: HOSPADM

## 2022-01-01 RX ORDER — ACETAMINOPHEN 325 MG/1
650 TABLET ORAL EVERY 8 HOURS PRN
Status: DISCONTINUED | OUTPATIENT
Start: 2022-01-01 | End: 2022-01-01 | Stop reason: HOSPADM

## 2022-01-01 RX ORDER — BENZONATATE 100 MG/1
100 CAPSULE ORAL 3 TIMES DAILY PRN
Qty: 30 CAPSULE | Refills: 2 | Status: SHIPPED | OUTPATIENT
Start: 2022-01-01 | End: 2022-01-01

## 2022-01-01 RX ORDER — SODIUM CHLORIDE 0.9 % (FLUSH) 0.9 %
10 SYRINGE (ML) INJECTION
Status: CANCELLED | OUTPATIENT
Start: 2022-01-01

## 2022-01-01 RX ORDER — PRAVASTATIN SODIUM 20 MG/1
20 TABLET ORAL NIGHTLY
Status: DISCONTINUED | OUTPATIENT
Start: 2022-01-01 | End: 2022-01-01 | Stop reason: HOSPADM

## 2022-01-01 RX ORDER — HYDROCODONE BITARTRATE AND ACETAMINOPHEN 5; 325 MG/1; MG/1
1 TABLET ORAL EVERY 4 HOURS PRN
Status: DISCONTINUED | OUTPATIENT
Start: 2022-01-01 | End: 2022-01-01 | Stop reason: HOSPADM

## 2022-01-01 RX ORDER — EPINEPHRINE 0.3 MG/.3ML
0.3 INJECTION SUBCUTANEOUS ONCE AS NEEDED
Status: CANCELLED | OUTPATIENT
Start: 2022-03-23

## 2022-01-01 RX ORDER — EPINEPHRINE 0.3 MG/.3ML
0.3 INJECTION SUBCUTANEOUS ONCE AS NEEDED
Status: CANCELLED | OUTPATIENT
Start: 2022-01-01

## 2022-01-01 RX ORDER — SODIUM CHLORIDE, SODIUM LACTATE, POTASSIUM CHLORIDE, CALCIUM CHLORIDE 600; 310; 30; 20 MG/100ML; MG/100ML; MG/100ML; MG/100ML
INJECTION, SOLUTION INTRAVENOUS CONTINUOUS PRN
Status: DISCONTINUED | OUTPATIENT
Start: 2022-01-01 | End: 2022-01-01

## 2022-01-01 RX ORDER — LIDOCAINE HYDROCHLORIDE 20 MG/ML
JELLY TOPICAL
Status: DISCONTINUED | OUTPATIENT
Start: 2022-01-01 | End: 2022-01-01

## 2022-01-01 RX ORDER — LEVETIRACETAM 500 MG/1
500 TABLET ORAL 2 TIMES DAILY
Status: DISCONTINUED | OUTPATIENT
Start: 2022-01-01 | End: 2022-01-01 | Stop reason: HOSPADM

## 2022-01-01 RX ORDER — PROMETHAZINE HYDROCHLORIDE 25 MG/1
25 TABLET ORAL EVERY 6 HOURS PRN
Status: DISCONTINUED | OUTPATIENT
Start: 2022-01-01 | End: 2022-01-01 | Stop reason: HOSPADM

## 2022-01-01 RX ORDER — HYDROCODONE BITARTRATE AND ACETAMINOPHEN 5; 325 MG/1; MG/1
1 TABLET ORAL EVERY 4 HOURS PRN
Status: DISCONTINUED | OUTPATIENT
Start: 2022-01-01 | End: 2022-01-01

## 2022-01-01 RX ORDER — ONDANSETRON 2 MG/ML
4 INJECTION INTRAMUSCULAR; INTRAVENOUS EVERY 12 HOURS PRN
Status: DISCONTINUED | OUTPATIENT
Start: 2022-01-01 | End: 2022-01-01 | Stop reason: HOSPADM

## 2022-01-01 RX ORDER — ROCURONIUM BROMIDE 10 MG/ML
INJECTION, SOLUTION INTRAVENOUS
Status: DISCONTINUED | OUTPATIENT
Start: 2022-01-01 | End: 2022-01-01

## 2022-01-01 RX ORDER — FAMOTIDINE 10 MG/ML
INJECTION INTRAVENOUS
Status: DISCONTINUED | OUTPATIENT
Start: 2022-01-01 | End: 2022-01-01

## 2022-01-01 RX ADMIN — HYDROCODONE BITARTRATE AND ACETAMINOPHEN 1 TABLET: 5; 325 TABLET ORAL at 09:03

## 2022-01-01 RX ADMIN — SODIUM CHLORIDE: 0.9 INJECTION, SOLUTION INTRAVENOUS at 03:03

## 2022-01-01 RX ADMIN — BENAZEPRIL HYDROCHLORIDE 40 MG: 20 TABLET ORAL at 09:03

## 2022-01-01 RX ADMIN — HYDROCODONE BITARTRATE AND ACETAMINOPHEN 1 TABLET: 5; 325 TABLET ORAL at 05:03

## 2022-01-01 RX ADMIN — LEVETIRACETAM 500 MG: 5 INJECTION, SOLUTION INTRAVENOUS at 09:03

## 2022-01-01 RX ADMIN — DOXYCYCLINE HYCLATE 100 MG: 100 CAPSULE ORAL at 08:03

## 2022-01-01 RX ADMIN — LEUCINE, PHENYLALANINE, LYSINE, METHIONINE, ISOLEUCINE, VALINE, HISTIDINE, THREONINE, TRYPTOPHAN, ALANINE, GLYCINE, ARGININE, PROLINE, SERINE, TYROSINE, SODIUM ACETATE, DIBASIC POTASSIUM PHOSPHATE, MAGNESIUM CHLORIDE, SODIUM CHLORIDE, CALCIUM CHLORIDE, DEXTROSE
311; 238; 247; 170; 255; 247; 204; 179; 77; 880; 438; 489; 289; 213; 17; 297; 261; 51; 77; 33; 5 INJECTION INTRAVENOUS at 08:03

## 2022-01-01 RX ADMIN — PRAVASTATIN SODIUM 20 MG: 20 TABLET ORAL at 08:03

## 2022-01-01 RX ADMIN — SENNOSIDES AND DOCUSATE SODIUM 1 TABLET: 50; 8.6 TABLET ORAL at 09:03

## 2022-01-01 RX ADMIN — FENTANYL CITRATE 50 MCG: 50 INJECTION INTRAMUSCULAR; INTRAVENOUS at 11:03

## 2022-01-01 RX ADMIN — LEVETIRACETAM 500 MG: 500 TABLET, FILM COATED ORAL at 09:03

## 2022-01-01 RX ADMIN — DOXYCYCLINE HYCLATE 100 MG: 100 CAPSULE ORAL at 09:03

## 2022-01-01 RX ADMIN — PRAVASTATIN SODIUM 20 MG: 20 TABLET ORAL at 09:03

## 2022-01-01 RX ADMIN — LIDOCAINE HYDROCHLORIDE 80 MG: 20 INJECTION, SOLUTION INTRAVENOUS at 11:03

## 2022-01-01 RX ADMIN — LEVETIRACETAM 500 MG: 5 INJECTION, SOLUTION INTRAVENOUS at 08:03

## 2022-01-01 RX ADMIN — DEXAMETHASONE SODIUM PHOSPHATE 6 MG: 4 INJECTION, SOLUTION INTRA-ARTICULAR; INTRALESIONAL; INTRAMUSCULAR; INTRAVENOUS; SOFT TISSUE at 06:03

## 2022-01-01 RX ADMIN — DEXAMETHASONE SODIUM PHOSPHATE 6 MG: 4 INJECTION, SOLUTION INTRA-ARTICULAR; INTRALESIONAL; INTRAMUSCULAR; INTRAVENOUS; SOFT TISSUE at 10:03

## 2022-01-01 RX ADMIN — AMLODIPINE BESYLATE 10 MG: 5 TABLET ORAL at 09:03

## 2022-01-01 RX ADMIN — IPRATROPIUM BROMIDE AND ALBUTEROL SULFATE 3 ML: .5; 3 SOLUTION RESPIRATORY (INHALATION) at 12:03

## 2022-01-01 RX ADMIN — DEXAMETHASONE SODIUM PHOSPHATE 6 MG: 4 INJECTION, SOLUTION INTRA-ARTICULAR; INTRALESIONAL; INTRAMUSCULAR; INTRAVENOUS; SOFT TISSUE at 01:03

## 2022-01-01 RX ADMIN — HYDROMORPHONE HYDROCHLORIDE 0.2 MG: 1 INJECTION, SOLUTION INTRAMUSCULAR; INTRAVENOUS; SUBCUTANEOUS at 02:03

## 2022-01-01 RX ADMIN — PHENYLEPHRINE HYDROCHLORIDE 150 MCG: 10 INJECTION INTRAVENOUS at 11:03

## 2022-01-01 RX ADMIN — METOPROLOL SUCCINATE 100 MG: 50 TABLET, EXTENDED RELEASE ORAL at 08:03

## 2022-01-01 RX ADMIN — BENAZEPRIL HYDROCHLORIDE 40 MG: 20 TABLET ORAL at 05:03

## 2022-01-01 RX ADMIN — BENAZEPRIL HYDROCHLORIDE 40 MG: 20 TABLET ORAL at 08:03

## 2022-01-01 RX ADMIN — SODIUM CHLORIDE, PRESERVATIVE FREE 2 ML: 5 INJECTION INTRAVENOUS at 06:03

## 2022-01-01 RX ADMIN — DEXAMETHASONE SODIUM PHOSPHATE 6 MG: 4 INJECTION, SOLUTION INTRA-ARTICULAR; INTRALESIONAL; INTRAMUSCULAR; INTRAVENOUS; SOFT TISSUE at 09:03

## 2022-01-01 RX ADMIN — LEVETIRACETAM 500 MG: 500 TABLET, FILM COATED ORAL at 08:03

## 2022-01-01 RX ADMIN — HYDROMORPHONE HYDROCHLORIDE 1 MG: 1 INJECTION, SOLUTION INTRAMUSCULAR; INTRAVENOUS; SUBCUTANEOUS at 01:03

## 2022-01-01 RX ADMIN — HYDROCODONE BITARTRATE AND ACETAMINOPHEN 1 TABLET: 5; 325 TABLET ORAL at 01:03

## 2022-01-01 RX ADMIN — SENNOSIDES AND DOCUSATE SODIUM 1 TABLET: 50; 8.6 TABLET ORAL at 08:03

## 2022-01-01 RX ADMIN — MULTIPLE VITAMINS W/ MINERALS TAB 1 TABLET: TAB at 09:03

## 2022-01-01 RX ADMIN — DEXAMETHASONE SODIUM PHOSPHATE 6 MG: 4 INJECTION, SOLUTION INTRA-ARTICULAR; INTRALESIONAL; INTRAMUSCULAR; INTRAVENOUS; SOFT TISSUE at 02:03

## 2022-01-01 RX ADMIN — ASPIRIN 81 MG: 81 TABLET, COATED ORAL at 09:03

## 2022-01-01 RX ADMIN — MULTIPLE VITAMINS W/ MINERALS TAB 1 TABLET: TAB at 08:03

## 2022-01-01 RX ADMIN — SUCCINYLCHOLINE CHLORIDE 140 MG: 20 INJECTION, SOLUTION INTRAMUSCULAR; INTRAVENOUS at 11:03

## 2022-01-01 RX ADMIN — ASPIRIN 81 MG: 81 TABLET, COATED ORAL at 08:03

## 2022-01-01 RX ADMIN — LIDOCAINE HYDROCHLORIDE 3 ML: 20 JELLY TOPICAL at 11:03

## 2022-01-01 RX ADMIN — METOPROLOL SUCCINATE 100 MG: 50 TABLET, EXTENDED RELEASE ORAL at 09:03

## 2022-01-01 RX ADMIN — AMLODIPINE BESYLATE 5 MG: 5 TABLET ORAL at 08:03

## 2022-01-01 RX ADMIN — DEXAMETHASONE SODIUM PHOSPHATE 6 MG: 4 INJECTION, SOLUTION INTRA-ARTICULAR; INTRALESIONAL; INTRAMUSCULAR; INTRAVENOUS; SOFT TISSUE at 05:03

## 2022-01-01 RX ADMIN — HYDROCODONE BITARTRATE AND ACETAMINOPHEN 1 TABLET: 5; 325 TABLET ORAL at 11:02

## 2022-01-01 RX ADMIN — HYDROCODONE BITARTRATE AND ACETAMINOPHEN 1 TABLET: 5; 325 TABLET ORAL at 03:03

## 2022-01-01 RX ADMIN — DEXAMETHASONE SODIUM PHOSPHATE 6 MG: 4 INJECTION, SOLUTION INTRA-ARTICULAR; INTRALESIONAL; INTRAMUSCULAR; INTRAVENOUS; SOFT TISSUE at 03:03

## 2022-01-01 RX ADMIN — HYDROCODONE BITARTRATE AND ACETAMINOPHEN 1 TABLET: 5; 325 TABLET ORAL at 12:03

## 2022-01-01 RX ADMIN — IPRATROPIUM BROMIDE AND ALBUTEROL SULFATE 3 ML: .5; 3 SOLUTION RESPIRATORY (INHALATION) at 01:03

## 2022-01-01 RX ADMIN — SODIUM CHLORIDE, PRESERVATIVE FREE 2 ML: 5 INJECTION INTRAVENOUS at 01:03

## 2022-01-01 RX ADMIN — DEXAMETHASONE SODIUM PHOSPHATE 6 MG: 4 INJECTION, SOLUTION INTRA-ARTICULAR; INTRALESIONAL; INTRAMUSCULAR; INTRAVENOUS; SOFT TISSUE at 11:03

## 2022-01-01 RX ADMIN — IPRATROPIUM BROMIDE AND ALBUTEROL SULFATE 3 ML: .5; 3 SOLUTION RESPIRATORY (INHALATION) at 07:03

## 2022-01-01 RX ADMIN — HYDROCODONE BITARTRATE AND ACETAMINOPHEN 1 TABLET: 5; 325 TABLET ORAL at 11:03

## 2022-01-01 RX ADMIN — METOPROLOL SUCCINATE 100 MG: 50 TABLET, EXTENDED RELEASE ORAL at 05:03

## 2022-01-01 RX ADMIN — SENNOSIDES AND DOCUSATE SODIUM 1 TABLET: 50; 8.6 TABLET ORAL at 10:03

## 2022-01-01 RX ADMIN — SODIUM CHLORIDE, SODIUM LACTATE, POTASSIUM CHLORIDE, AND CALCIUM CHLORIDE: .6; .31; .03; .02 INJECTION, SOLUTION INTRAVENOUS at 11:03

## 2022-01-01 RX ADMIN — AMLODIPINE BESYLATE 5 MG: 5 TABLET ORAL at 09:03

## 2022-01-01 RX ADMIN — PHENYLEPHRINE HYDROCHLORIDE 100 MCG: 10 INJECTION INTRAVENOUS at 11:03

## 2022-01-01 RX ADMIN — DOXYCYCLINE HYCLATE 100 MG: 100 CAPSULE ORAL at 10:03

## 2022-01-01 RX ADMIN — HYDROCODONE BITARTRATE AND ACETAMINOPHEN 1 TABLET: 5; 325 TABLET ORAL at 08:03

## 2022-01-01 RX ADMIN — HYDROCODONE BITARTRATE AND ACETAMINOPHEN 1 TABLET: 5; 325 TABLET ORAL at 06:03

## 2022-01-01 RX ADMIN — ASPIRIN 81 MG: 81 TABLET, COATED ORAL at 05:03

## 2022-01-01 RX ADMIN — HYDROMORPHONE HYDROCHLORIDE 1 MG: 1 INJECTION, SOLUTION INTRAMUSCULAR; INTRAVENOUS; SUBCUTANEOUS at 07:03

## 2022-01-01 RX ADMIN — LEVETIRACETAM 500 MG: 5 INJECTION, SOLUTION INTRAVENOUS at 03:03

## 2022-01-01 RX ADMIN — IPRATROPIUM BROMIDE AND ALBUTEROL SULFATE 3 ML: .5; 3 SOLUTION RESPIRATORY (INHALATION) at 02:03

## 2022-01-01 RX ADMIN — BENZONATATE 100 MG: 100 CAPSULE ORAL at 08:03

## 2022-01-01 RX ADMIN — CEFAZOLIN 2 G: 330 INJECTION, POWDER, FOR SOLUTION INTRAMUSCULAR; INTRAVENOUS at 11:03

## 2022-01-01 RX ADMIN — ACETAMINOPHEN 1000 MG: 10 INJECTION, SOLUTION INTRAVENOUS at 11:03

## 2022-01-01 RX ADMIN — SODIUM CHLORIDE: 0.9 INJECTION, SOLUTION INTRAVENOUS at 05:03

## 2022-01-01 RX ADMIN — IOHEXOL 100 ML: 350 INJECTION, SOLUTION INTRAVENOUS at 08:01

## 2022-01-01 RX ADMIN — FAMOTIDINE 20 MG: 10 INJECTION, SOLUTION INTRAVENOUS at 11:03

## 2022-01-01 RX ADMIN — ROCURONIUM BROMIDE 5 MG: 10 INJECTION, SOLUTION INTRAVENOUS at 11:03

## 2022-01-01 RX ADMIN — PRAVASTATIN SODIUM 20 MG: 20 TABLET ORAL at 10:03

## 2022-01-01 RX ADMIN — LEVETIRACETAM 500 MG: 5 INJECTION, SOLUTION INTRAVENOUS at 10:03

## 2022-01-01 RX ADMIN — PROPOFOL 90 MG: 10 INJECTION, EMULSION INTRAVENOUS at 11:03

## 2022-01-01 RX ADMIN — ONDANSETRON 4 MG: 2 INJECTION INTRAMUSCULAR; INTRAVENOUS at 07:03

## 2022-01-01 RX ADMIN — SODIUM CHLORIDE 1000 ML: 0.9 INJECTION, SOLUTION INTRAVENOUS at 10:03

## 2022-01-01 RX ADMIN — SODIUM CHLORIDE, PRESERVATIVE FREE 2 ML: 5 INJECTION INTRAVENOUS at 11:03

## 2022-01-01 RX ADMIN — OXYCODONE HYDROCHLORIDE 5 MG: 5 TABLET ORAL at 02:03

## 2022-01-01 RX ADMIN — SODIUM CHLORIDE, PRESERVATIVE FREE 2 ML: 5 INJECTION INTRAVENOUS at 05:03

## 2022-01-01 RX ADMIN — SODIUM CHLORIDE, PRESERVATIVE FREE 2 ML: 5 INJECTION INTRAVENOUS at 12:03

## 2022-01-01 RX ADMIN — SUGAMMADEX 200 MG: 100 INJECTION, SOLUTION INTRAVENOUS at 01:03

## 2022-01-01 RX ADMIN — ROCURONIUM BROMIDE 45 MG: 10 INJECTION, SOLUTION INTRAVENOUS at 11:03

## 2022-01-01 RX ADMIN — BENZONATATE 100 MG: 100 CAPSULE ORAL at 05:03

## 2022-01-01 RX ADMIN — MULTIPLE VITAMINS W/ MINERALS TAB 1 TABLET: TAB at 05:03

## 2022-01-01 RX ADMIN — ONDANSETRON 4 MG: 2 INJECTION INTRAMUSCULAR; INTRAVENOUS at 11:03

## 2022-01-01 RX ADMIN — LEUCINE, PHENYLALANINE, LYSINE, METHIONINE, ISOLEUCINE, VALINE, HISTIDINE, THREONINE, TRYPTOPHAN, ALANINE, GLYCINE, ARGININE, PROLINE, SERINE, TYROSINE, SODIUM ACETATE, DIBASIC POTASSIUM PHOSPHATE, MAGNESIUM CHLORIDE, SODIUM CHLORIDE, CALCIUM CHLORIDE, DEXTROSE
311; 238; 247; 170; 255; 247; 204; 179; 77; 880; 438; 489; 289; 213; 17; 297; 261; 51; 77; 33; 5 INJECTION INTRAVENOUS at 02:03

## 2022-01-01 RX ADMIN — HYDROMORPHONE HYDROCHLORIDE 1 MG: 1 INJECTION, SOLUTION INTRAMUSCULAR; INTRAVENOUS; SUBCUTANEOUS at 04:03

## 2022-01-01 RX ADMIN — HYDROMORPHONE HYDROCHLORIDE 1 MG: 1 INJECTION, SOLUTION INTRAMUSCULAR; INTRAVENOUS; SUBCUTANEOUS at 10:03

## 2022-01-01 RX ADMIN — AMLODIPINE BESYLATE 5 MG: 5 TABLET ORAL at 05:03

## 2022-01-01 RX ADMIN — SODIUM CHLORIDE, SODIUM LACTATE, POTASSIUM CHLORIDE, AND CALCIUM CHLORIDE: .6; .31; .03; .02 INJECTION, SOLUTION INTRAVENOUS at 01:03

## 2022-01-01 RX ADMIN — HYDROMORPHONE HYDROCHLORIDE 1 MG: 1 INJECTION, SOLUTION INTRAMUSCULAR; INTRAVENOUS; SUBCUTANEOUS at 08:03

## 2022-01-01 RX ADMIN — LEUCINE, PHENYLALANINE, LYSINE, METHIONINE, ISOLEUCINE, VALINE, HISTIDINE, THREONINE, TRYPTOPHAN, ALANINE, GLYCINE, ARGININE, PROLINE, SERINE, TYROSINE, SODIUM ACETATE, DIBASIC POTASSIUM PHOSPHATE, MAGNESIUM CHLORIDE, SODIUM CHLORIDE, CALCIUM CHLORIDE, DEXTROSE
311; 238; 247; 170; 255; 247; 204; 179; 77; 880; 438; 489; 289; 213; 17; 297; 261; 51; 77; 33; 5 INJECTION INTRAVENOUS at 09:03

## 2022-01-01 RX ADMIN — SODIUM CHLORIDE 200 MG: 9 INJECTION, SOLUTION INTRAVENOUS at 03:03

## 2022-01-03 NOTE — TELEPHONE ENCOUNTER
On 12/29 prescription of norco sent to pharmacy by TRAMAINE Velázquez NP in for pts pain.  Went over chest x ray with pt showing increased mass.    Pt called in today 1/3 stating the pain is terrible and he is still coughing up blood. Pt states norco is not helping. Pt to come in today at 4:15 to see Dr. Zacarias.  Pt verbalized understanding.
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
## 2022-01-03 NOTE — TELEPHONE ENCOUNTER
Per Dr. Zacarias, scans ordered stat at [facility].  Spoke with scheduling. Pt is scheduled for 8:00 on 1/4.  Pt will arrive at 7:45.  Pt to fast for 4 hours prior to scan.  Pt notified.  Pt verbalized understanding.
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
## 2022-01-04 NOTE — PATIENT INSTRUCTIONS
Instructions given on chest radiation and skin care.  Radiation Therapy for Cancer and Radiation Therapy for Lung Cancer Brochures given.  Patient verbalized understanding.

## 2022-01-04 NOTE — PROGRESS NOTES
Kalen Evans  [id number]  1945 1/4/2022  SUDHAKAR Zacarias Md  [address]  [address]  [place] [address]    DIAGNOSIS: Stage IVB neuroendocrine lung cancer    REASON FOR VISIT: Routine scheduled follow-up.     HISTORY OF PRESENT ILLNESS:   Mr. Evans is a 76M smoker dx'd w/ metastatic NE lung cancer in 2021 and has undergone upfront systemic therapies w/ Dr. Zacarias but now p/w disease progression and worsening bony/rib pain in his left chest/flank from numerous large thoracic lesions.  He has been sent for eval re: pallaitive RT during a break from systemic therapies.  He endorses and identifies sites of painful pressure, some of which correlate to his large anterior chest mass w/ rib invasion while others only to non-malignancy-involved rib fractures.  He also reports recent small volume jossue hemoptysis but denies wekaness, pallor, palpitations, f/c/n/v/d/(cardiac)cp/sob.    REVIEW OF SYSTEMS:  A complete review of systems was performed and the patient denies any acute concerns/changes other than per HPI    Past Medical History:   Diagnosis Date    Hyperlipidemia     Hypertension     Myocardial infarction      Past Surgical History:   Procedure Laterality Date    BACK SURGERY N/A 1967    CARDIAC SURGERY      FRACTURE SURGERY      right wrist    HERNIA REPAIR      ROTATOR CUFF REPAIR       Social History     Socioeconomic History    Marital status:    Occupational History     Employer: [employer]   Tobacco Use    Smoking status: Former Smoker     Packs/day: 0.50     Types: Cigarettes     Start date: 9/10/2015    Smokeless tobacco: Never Used   Substance and Sexual Activity    Alcohol use: Yes     Comment: very rarely    Drug use: No     Family History   Problem Relation Age of Onset    Diabetes Mother     Cancer Father         pancreatic    Hypertension Father      Medication List with Changes/Refills   Current Medications    AMLODIPINE-BENAZEPRIL (LOTREL) 5-40 MG PER CAPSULE    Take  1 capsule by mouth once daily.    ASPIRIN (ECOTRIN) 81 MG EC TABLET    Take 81 mg by mouth once daily.    FLUTICASONE (FLONASE) 50 MCG/ACTUATION NASAL SPRAY    USE 2 SPRAYS INTO EACH NOSTRIL AS NEEDED    GLUCOSAMINE-CHONDROITIN 250-200 MG TAB    Take 1 tablet by mouth once daily.    HYDROCODONE-ACETAMINOPHEN (NORCO) 5-325 MG PER TABLET    Take 1 tablet by mouth every 6 (six) hours as needed for Pain.    HYDROCORTISONE (ANUSOL-HC) 25 MG SUPPOSITORY    UNWRAP AND INSERT ONE SUPPOSITORY RECTALLY TWICE DAILY TO FOUR TIMES DAILY FOR 1-2 WEEKS (INSERT ONE AFTER BOWEL MOVEMENT)    LEVOFLOXACIN (LEVAQUIN) 500 MG TABLET    Take 1 tablet (500 mg total) by mouth once daily. for 7 days    LIPASE-PROTEASE-AMYLASE (ZENPEP) 40,000-126,000- 168,000 UNIT CPDR    Take 2 po with meals, take 1 po with snacks.    METOPROLOL SUCCINATE (TOPROL-XL) 100 MG 24 HR TABLET    TAKE ONE TABLET BY MOUTH EVERY DAY    MULTIVITAMIN CAPSULE    Take 1 capsule by mouth once daily.    ONDANSETRON (ZOFRAN) 8 MG TABLET    Take 1 tablet (8 mg total) by mouth every 8 (eight) hours as needed.    PRAVASTATIN (PRAVACHOL) 20 MG TABLET    Take 1 tablet (20 mg total) by mouth once daily.    PROMETHAZINE (PHENERGAN) 25 MG TABLET    Take 1 tablet (25 mg total) by mouth every 4 to 6 hours as needed.    TADALAFIL (CIALIS) 5 MG TABLET    Take 5 mg by mouth once daily.     Review of patient's allergies indicates:   Allergen Reactions    Penicillins Other (See Comments)     Stomach upset, cramping       QUALITY OF LIFE: 70%- Cares for Self: Unable to Carry on Normal Activity or Active Work    There were no vitals filed for this visit.  There is no height or weight on file to calculate BMI.    PHYSICAL EXAM:  GENERAL: alert; in no apparent distress.   HEAD: normocephalic, atraumatic.  EYES: pupils are equal, round, reactive to light and accommodation. Sclera anicteric. Conjunctiva not injected.   NOSE/THROAT: no nasal erythema or rhinorrhea. Oropharynx pink, without  erythema, ulcerations or thrush.   NECK: no cervical motion rigidity; supple with no masses.  CHEST: TTP w/ deep palpation of sternum and lateral 4th-6th ribs w/oi overlying skin changes or masses; coarse but diminished BS, R>L to auscultation bilaterally; no wheezes, crackles or rubs. Patient is speaking comfortably on room air with normal work of breathing without using accessory muscles of respiration.  CARDIOVASCULAR: regular rate and rhythm; no murmurs, rubs or gallops.  ABDOMEN: soft, nontender, nondistended. Bowel sounds present.   MUSCULOSKELETAL: no tenderness to palpation along the spine or scapulae. Normal range of motion.  NEUROLOGIC: cranial nerves II-XII intact bilaterally. Strength 5/5 in bilateral upper and lower extremities. No sensory deficits appreciated. Reflexes globally intact. No cerebellar signs. Normal gait.  LYMPHATIC: no cervical, supraclavicular or axillary adenopathy appreciated bilaterally.       ASSESSMENT: Kalen Evans is a 76 y.o. male with stage IVB neuroendocrine lung cancer    PLAN:   - RTP for 2 weeks of palliative thoracic/rib RT for pain and hemoptysis    I spent over 30 minutes with the patient discussing the rationales, risks, benefits, and alternatives to thoracic RT in this setting, as well as the potential toxicities, including but not limited to fatigue, skin irritation/erythema/desquamation, chronic chest wall pain, hemoptysis due to bronchial wall damage, spinal cord myelopathy, pericarditis, pneumothorax, pneumonitis requiring steroid therapy, respiratory distress, pulmonary fibrosis possibly leading to oxygen therapy dependence, esophagitis causing dysphagia/odynophagia or chronic TE fistula, increased lifetime risk of cardiac disease/events, brachial plexopathy causing pain/weakness/paralysis/parasthesias of LUE, and secondary malignancy.  I carefully explained the process of simulation and treatment delivery with weekly physician visits.    The patient verbalized  understanding of the above plan, risks, benefits, and details, and informed consent was obtained.  We will proceed with RTP-CT imaging with plans to initiate RT w/in the next few days.  Contact info was exchanged, and the patient was encouraged to contact our clinic with any questions, needs, or concerns.    All questions answered and contact information provided. Patient understands free to call us anytime with any questions or concerns regarding radiation therapy.    I have personally seen and evaluated this patient. Greater than 50% of this time was spent discussing coordination of care and/or counseling.    PHYSICIAN: Layton Nguyen III, MD

## 2022-01-10 NOTE — TELEPHONE ENCOUNTER
Pt will not be getting keytruda tomorrow. Pt is receiving 10 doses of radiation at this time.  Pt to f/u with Dr. Zacarias 1/27. Verbalized understanding.
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
## 2022-02-07 NOTE — DISCHARGE INSTRUCTIONS
Norco as directed if needed for pain  Sling and swath as directed  Please follow-up with orthopedist as directed for definitive care  Return for any concerns

## 2022-02-07 NOTE — ED PROVIDER NOTES
Encounter Date: 2/7/2022       History     Chief Complaint   Patient presents with    Arm Pain     Pt on chemo and already has a broken rib from coughing.  Picked a 12lbs dog and heard a pop in his right arm.  Knot noted to right arm under shoulder     76-year-old male presents emergency department with complaint of right arm pain.  Patient has a history of lung cancer currently on chemotherapy he states that he has had fractures related to his cancer previously.  He states that he has a a 12 lb dog that he picked up and felt a pop to his right arm .  The patient is right-hand dominant.  His has no further complaints        Review of patient's allergies indicates:   Allergen Reactions    Penicillins Other (See Comments)     Stomach upset, cramping     Past Medical History:   Diagnosis Date    Hyperlipidemia     Hypertension     Myocardial infarction      Past Surgical History:   Procedure Laterality Date    BACK SURGERY N/A 1967    CARDIAC SURGERY      FRACTURE SURGERY      right wrist    HERNIA REPAIR      ROTATOR CUFF REPAIR       Family History   Problem Relation Age of Onset    Diabetes Mother     Cancer Father         pancreatic    Hypertension Father      Social History     Tobacco Use    Smoking status: Former Smoker     Packs/day: 0.50     Types: Cigarettes     Start date: 9/10/2015    Smokeless tobacco: Never Used   Substance Use Topics    Alcohol use: Yes     Comment: very rarely    Drug use: No     Review of Systems   Constitutional: Negative.    HENT: Negative.    Respiratory: Negative.    Cardiovascular: Negative.    Gastrointestinal: Negative.    Genitourinary: Negative.    Musculoskeletal:        Right humerus pain   Skin: Negative.    Allergic/Immunologic: Negative.    Neurological: Negative.    Hematological: Negative.    Psychiatric/Behavioral: Negative.    All other systems reviewed and are negative.      Physical Exam     Initial Vitals [02/07/22 1034]   BP Pulse Resp Temp SpO2    (!) 148/58 93 20 97.8 °F (36.6 °C) 95 %      MAP       --         Physical Exam    Nursing note and vitals reviewed.  Constitutional: He appears well-developed and well-nourished.   HENT:   Head: Normocephalic and atraumatic.   Cardiovascular: Normal rate and regular rhythm.   Pulmonary/Chest: Breath sounds normal.   Musculoskeletal:      Right upper arm: Swelling and bony tenderness present.        Arms:            ED Course   Splint Application    Date/Time: 2/7/2022 11:50 AM  Performed by: AMALIA Allen  Authorized by: Sergio Gastelum MD   Location details: right arm  Post-procedure: The splinted body part was neurovascularly unchanged following the procedure.  Patient tolerance: Patient tolerated the procedure well with no immediate complications  Comments: Sling and swathe applied.        Labs Reviewed - No data to display       Imaging Results          X-Ray Humerus 2 View Right (Final result)  Result time 02/07/22 11:46:13    Final result by Jensen March MD (02/07/22 11:46:13)                 Narrative:    HISTORY: Right arm pain, lifting injury, lung carcinoma undergoing chemotherapy.    FINDINGS: 2 views of the right humerus obtained in 4 images show acute oblique fracture through the mid humeral diaphysis, with up to 17 mm distraction of fracture fragments. There are no other acute fractures. The bones appear diffusely osteopenic.    IMPRESSION: Acute oblique mid shaft humeral fracture.    Electronically signed by:  Jensen March MD  2/7/2022 11:46 AM [facility] Workstation: [id number]                               Medications   HYDROcodone-acetaminophen 5-325 mg per tablet 1 tablet (1 tablet Oral Given 2/7/22 6568)     Medical Decision Making:   Initial Assessment:   76-year-old male presents emergency department with complaint of right arm pain.  Patient has a history of lung cancer currently on chemotherapy he states that he has had fractures related to his cancer previously.  He states that he has  a a 12 lb dog that he picked up and felt a pop to his right arm .  The patient is right-hand dominant.  His has no further complaints        Differential Diagnosis:   Fracture, musculoskeletal pain, muscle strain  ED Management:  76-year-old well-appearing male presents emergency department currently receiving chemotherapy for lung cancer with bony Mets he states he picked up his dog felt a pop to his right arm he has a nondisplaced fracture of the right humerus.  Neurovascular status is grossly intact.  Sling and swath will be placed patient will be given pain medications instructed follow up with an orthopedist for definitive care given detailed return precautions                      Clinical Impression:   Final diagnoses:  [R52] Pain  [S49.001A] Nondisplaced physeal fracture of proximal end of right humerus, initial encounter (Primary)          ED Disposition Condition    Discharge Stable        ED Prescriptions     Medication Sig Dispense Start Date End Date Auth. Provider    HYDROcodone-acetaminophen (NORCO) 5-325 mg per tablet Take 1 tablet by mouth every 4 (four) hours as needed for Pain. 12 tablet 2/7/2022  AMALIA Allen        Follow-up Information     Follow up With Specialties Details Why Contact Info    Da Bass MD Orthopedic Surgery, Surgery Schedule an appointment as soon as possible for a visit in 2 days  [address] [place] [address] DR Rubén LEON [id number]  [phone]             AMALIA Allen  02/07/22 5857

## 2022-02-07 NOTE — TELEPHONE ENCOUNTER
----- Message from Kelly Malave MA sent at 2/7/2022  4:29 PM CST -----  Contact: pt  [facility] follow up   Right Humerus  FX   Call back  [phone]
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
## 2022-02-09 NOTE — PROGRESS NOTES
2/9/2022    Past Medical History:   Diagnosis Date    Hyperlipidemia     Hypertension     Myocardial infarction        Past Surgical History:   Procedure Laterality Date    BACK SURGERY N/A 1967    CARDIAC SURGERY      FRACTURE SURGERY      right wrist    HERNIA REPAIR      ROTATOR CUFF REPAIR         Current Outpatient Medications   Medication Sig    amLODIPine-benazepriL (LOTREL) 5-40 mg per capsule Take 1 capsule by mouth once daily.    aspirin (ECOTRIN) 81 MG EC tablet Take 81 mg by mouth once daily.    benzonatate (TESSALON PERLES) 100 MG capsule Take 1 capsule (100 mg total) by mouth 3 (three) times daily as needed for Cough.    fluticasone (FLONASE) 50 mcg/actuation nasal spray USE 2 SPRAYS INTO EACH NOSTRIL AS NEEDED (Patient taking differently: 2 sprays by Each Nostril route as needed.)    glucosamine-chondroitin 250-200 mg Tab Take 1 tablet by mouth once daily.    HYDROcodone-acetaminophen (NORCO) 5-325 mg per tablet Take 1 tablet by mouth every 4 (four) hours as needed for Pain.    HYDROcodone-acetaminophen (NORCO) 5-325 mg per tablet Take 2 tablets by mouth every 6 (six) hours as needed for Pain.    hydrocortisone (ANUSOL-HC) 25 mg suppository Place 25 mg rectally 2 (two) times daily as needed. 2-4 times QD PRN    lipase-protease-amylase (ZENPEP) 40,000-126,000- 168,000 unit CpDR Take 2 po with meals, take 1 po with snacks.    metoprolol succinate (TOPROL-XL) 100 MG 24 hr tablet TAKE ONE TABLET BY MOUTH EVERY DAY (Patient taking differently: Take 100 mg by mouth once daily.)    multivitamin capsule Take 1 capsule by mouth once daily.    ondansetron (ZOFRAN) 8 MG tablet Take 1 tablet (8 mg total) by mouth every 8 (eight) hours as needed.    pravastatin (PRAVACHOL) 20 MG tablet Take 1 tablet (20 mg total) by mouth once daily.    promethazine (PHENERGAN) 25 MG tablet Take 1 tablet (25 mg total) by mouth every 4 to 6 hours as needed.    tadalafiL (CIALIS) 5 MG tablet Take 5 mg by mouth  once daily.     No current facility-administered medications for this visit.       Review of patient's allergies indicates:   Allergen Reactions    Penicillins Other (See Comments)     Stomach upset, cramping       Family History   Problem Relation Age of Onset    Diabetes Mother     Cancer Father         pancreatic    Hypertension Father        Social History     Socioeconomic History    Marital status:    Occupational History     Employer: [employer]   Tobacco Use    Smoking status: Former Smoker     Packs/day: 0.50     Types: Cigarettes     Start date: 9/10/2015    Smokeless tobacco: Never Used   Substance and Sexual Activity    Alcohol use: Yes     Comment: very rarely    Drug use: No       Chief Complaint:   Chief Complaint   Patient presents with    Right Shoulder - Injury, Pain, Initial Visit, Initial Assessment     NP: ED on 02/07/22 -- Picked up a 12 lb dog & heard a pop in rt arm sustaining a physeal FX of proximal end of rt humerus. FYI: Pt currently on Chemo for lung cancer & already has a broken rib from coughing.      Shoulder Pain      PL 8/10 at rest, & 10/10 with use. Rt hand dominant. Currently in sling/immobilizer.          History of present illness:    This is a 76 y.o. year old male who complains of patient is being seen today as a right humerus fracture he was picking up a 12 lb dog and felt a pop in his shoe shoulder patient does have a history of carcinoma lung carcinoma he is being treated with chemotherapy    Review of Systems:    Constitution: Denies chills, fever, and sweats.  HENT: Denies headaches or blurry vision.  Cardiovascular: Denies chest pain or irregular heart beat.  Respiratory: Denies cough or shortness of breath.  Gastrointestinal: Denies abdominal pain, nausea, or vomiting.  Musculoskeletal:  Denies muscle cramps.  Neurological: Denies dizziness or focal weakness.  Psychiatric/Behavioral: Normal mental status.  Hematologic/Lymphatic: Denies bleeding  "problem or easy bruising/bleeding.  Skin: Denies rash or suspicious lesions.    Examination:    Vital Signs:    Vitals:    02/09/22 0915   Resp: 16   Weight: 84.4 kg (186 lb)   Height: 5' 9" (1.753 m)   PainSc:   8   PainLoc: Shoulder       Body mass index is 27.47 kg/m².    This a well-developed, well nourished patient in no acute distress.    Alert and oriented x 3 and cooperative to examination.       Physical Exam:  Right arm-he is presently in a sling and swath neurovascularly intact    Imaging:  X-rays show a spiral fracture image area minimally disc does not show evidence of a pathologic fracture he does have osteoporotic bone the x-ray in the show me an toe brace looks excellent in both the AP and lateral planes       Assessment: Closed nondisplaced segmental fracture of shaft of right humerus, initial encounter        Plan:  We placed the patient in aSarmiento brace his reduction looks good. I am concerned the they probably has a pathologic fracture I am going to send him to our orthopedic oncologist for further treatment will refer him to Dr. Sanches      DISCLAIMER: This note may have been dictated using voice recognition software and may contain grammatical errors.     NOTE: Consult report sent to referring provider via EPIC EMR.    "

## 2022-02-10 NOTE — TELEPHONE ENCOUNTER
----- Message from Roel Castro sent at 2/10/2022 11:13 AM CST -----  Regarding: questions about visit yesterday, call pt   Contact: pt   questions about visit call pt

## 2022-02-10 NOTE — PROGRESS NOTES
DIAGNOSIS: Stage IVB neuroendocrine lung cancer    TREATMENT      Contacted patient today for 3 week checkup. Patient reports good pain relief and great tolerance from palliative thoracic RT, as above.  He denies any SOB/cp, dyspnea, esophagitis, palpitations, or other recent changes or complaints.  He did recently fracture his arm lifting his dog, so resuming his chemo has been delayed, but otherwise reports stable condition.    A/P  1.  Follow ortho/surg recs re: humeral fracture  2.  Follow-up with Dr. Zacarias, as directed  3.  Return to clinic prn for further palliative RT needs per patient and MedOnc  4.  COVID-19 precautions discussed.

## 2022-02-14 NOTE — TELEPHONE ENCOUNTER
----- Message from Elayne Enciso sent at 2/14/2022 10:06 AM CST -----  Regarding: advice  Contact: patient  Type: Needs Medical Advice  Who Called:  patient  Symptoms (please be specific):  fracture right arm  Best Call Back Number: [phone] (home)   Additional Information: Patient is still having trouble with is arm brace. By the end of the day the arm is by is side. Please call patient to advise.Thanks!

## 2022-02-14 NOTE — TELEPHONE ENCOUNTER
Returned call. Discussed placement of sling. Advised pt to come to clinic to have our Orthotic fitter readjust sling for him.

## 2022-02-14 NOTE — TELEPHONE ENCOUNTER
----- Message from Kelly Malave MA sent at 2/14/2022  2:17 PM CST -----  Contact: pt  Wants call back  FX right arm   Sling will not stay up   Call back

## 2022-02-28 NOTE — TELEPHONE ENCOUNTER
"----- Message from Kelly Malave MA sent at 2/28/2022  9:30 AM CST -----  Contact: pt  Pt thinks "plastic sleeve" has moved   Call back      "

## 2022-02-28 NOTE — TELEPHONE ENCOUNTER
Returned call. Dr. Sanches unable to see patient until 03/25/2022. Will follow up with patient upon Dr. Bass's return to clinic on Wednesday afternoon regarding brace.

## 2022-02-28 NOTE — TELEPHONE ENCOUNTER
Returned call. Patient is status post Right Shaft Humerus FX and is concerned with brace shifting. Dr. Bass is out of the office until Wednesday afternoon. Patient was referred out to Dr. Sanches due to nature of pt's FX and h/o cancer and cancer treatment. Patient was placed in a Poe brace at his last visit to help pull him over until he could be evaluated by Dr. Sanches. Advised patient to reach out to Dr. Sanches's office to see what their recommendation is at this time. Provided patient with their office number, and will send Dr. Bass a message on status to review upon his return to clinic Wednesday afternoon on 03/02/2022. Message also sent to Dr. Sanches's staff as well.

## 2022-02-28 NOTE — TELEPHONE ENCOUNTER
----- Message from Roxanne Cabrera sent at 2/28/2022  1:32 PM CST -----  Regarding: Appointment  Contact: pt @ [phone]  Patient is calling office about appointment on 3/25 with Dr Sanchse. Patient is not sure if he should still go to appointment. Patient would like a call back from office.

## 2022-03-02 NOTE — PLAN OF CARE
Problem: Activity Intolerance  Goal: Enhanced Capacity and Energy  Outcome: Ongoing, Progressing  Intervention: Optimize Activity Tolerance  Flowsheets (Taken 3/2/2022 1503)  Self-Care Promotion: independence encouraged  Activity Management:   Ambulated -L4   Ambulated in tompkins - L4   Ambulated in room - L4   Up in chair - L1  Environmental Support:   calm environment promoted   distractions minimized   rest periods encouraged

## 2022-03-03 NOTE — TELEPHONE ENCOUNTER
Reached out to patient and advised to seek medical attention in the ED for pain and refitting/adjustment of the brace, per Dr. Bass. Message was sent to Dr. Sanches's office requesting sooner appointment. Dr. Sanches is unable to see patient any sooner. Explained that due to location and DX of cancer, orthotic fitter is unable to adjust humerus brace. Explained that the sling could be adjusted, but not the actual brace. Explained that pain medication may possibly be provided in ER to help manage his pain, updated xray to reassess status of humerus fracture, and possibly being fitted for another brace that may offer him more comfort. Pt verbalized understanding of the above.

## 2022-03-03 NOTE — TELEPHONE ENCOUNTER
----- Message from Roel Castro sent at 3/3/2022  9:57 AM CST -----  Regarding: Truss has come loose and severe pain, call pt   Contact: pt   Truss has come loose and severe pain, call pt

## 2022-03-04 PROBLEM — E87.1 HYPONATREMIA: Status: ACTIVE | Noted: 2022-01-01

## 2022-03-04 PROBLEM — G93.9 BRAIN LESION: Status: ACTIVE | Noted: 2022-01-01

## 2022-03-04 PROBLEM — S41.101S: Status: ACTIVE | Noted: 2022-01-01

## 2022-03-04 PROBLEM — R29.6 FALLS FREQUENTLY: Status: ACTIVE | Noted: 2022-01-01

## 2022-03-04 PROBLEM — S42.411S RIGHT SUPRACONDYLAR HUMERUS FRACTURE, SEQUELA: Status: ACTIVE | Noted: 2022-01-01

## 2022-03-04 NOTE — ED PROVIDER NOTES
Encounter Date: 3/4/2022       History     Chief Complaint   Patient presents with    Fall     Pt has had multiple falls recently from weakness.  Pt denies hitting his head or LOC     76-year-old male history significant for hypertension, hyperlipidemia, neuroendocrine lung carcinoma, prior CABG presenting to the emergency room for 48 hours of progressively worsening weakness, multiple ground level falls.  Patient states that he fell yesterday, and suffered a questionable pathologic proximal right humerus fracture.  He was seen and splinted in this emergency room and scheduled for follow-up with orthopedics today.  However, when he woke up this morning, he was suffered another ground level fall.  He did not hit his head and he is not anticoagulated.  He has no additional complaints, no chest pain, no shortness of breath, no headache or visual changes.  To his knowledge, his lung cancer is not metastatic at this point and he is undergoing chemotherapy actively.         PMH as above.  PSH as above.  Patient medications listed as reconciled in the chart.  Patient reports compliance with all medications.  NKDA.  No reported tobacco, alcohol or illicit drug use, now.  Former smoker.        Review of patient's allergies indicates:   Allergen Reactions    Penicillins Other (See Comments)     Stomach upset, cramping     Past Medical History:   Diagnosis Date    Hyperlipidemia     Hypertension     Myocardial infarction      Past Surgical History:   Procedure Laterality Date    BACK SURGERY N/A 1967    CARDIAC SURGERY      FRACTURE SURGERY      right wrist    HERNIA REPAIR      ROTATOR CUFF REPAIR       Family History   Problem Relation Age of Onset    Diabetes Mother     Cancer Father         pancreatic    Hypertension Father      Social History     Tobacco Use    Smoking status: Former Smoker     Packs/day: 0.50     Types: Cigarettes     Start date: 9/10/2015    Smokeless tobacco: Never Used   Substance Use  Topics    Alcohol use: Yes     Comment: very rarely    Drug use: No     Review of Systems   Constitutional: Positive for fatigue. Negative for activity change, appetite change, chills and fever.   HENT: Negative for congestion, hearing loss, nosebleeds, rhinorrhea, sore throat and trouble swallowing.    Eyes: Negative for visual disturbance.   Respiratory: Negative for cough, chest tightness and shortness of breath.    Cardiovascular: Negative for chest pain, palpitations and leg swelling.   Gastrointestinal: Negative for abdominal pain and nausea.   Endocrine: Negative for polyuria.   Genitourinary: Negative for difficulty urinating.   Musculoskeletal: Negative for arthralgias, back pain and myalgias.   Skin: Negative for rash.   Neurological: Positive for weakness. Negative for dizziness and headaches.   Psychiatric/Behavioral: The patient is not nervous/anxious.    All other systems reviewed and are negative.      Physical Exam     Initial Vitals [03/04/22 0841]   BP Pulse Resp Temp SpO2   123/65 76 18 98.2 °F (36.8 °C) (!) 93 %      MAP       --         Physical Exam    Nursing note and vitals reviewed.  Constitutional: He appears well-developed and well-nourished.   HENT:   Head: Normocephalic and atraumatic.   Eyes: EOM are normal. Pupils are equal, round, and reactive to light.   Neck: Neck supple.   Normal range of motion.  Cardiovascular: Normal rate and regular rhythm.   Pulmonary/Chest: Breath sounds normal.   Abdominal: Abdomen is soft. He exhibits no distension. There is no abdominal tenderness.   Musculoskeletal:      Cervical back: Normal range of motion and neck supple.      Comments: Splinted right upper extremity secondary to humerus fracture.     Neurological: He is alert and oriented to person, place, and time. GCS score is 15. GCS eye subscore is 4. GCS verbal subscore is 5. GCS motor subscore is 6.   Patient is alert and oriented to person, place, time, and event. GCS 15: Motor 6, Verbal 5,  Eye 4. No focal neurologic deficits.  No facial droop, dysarthria, or aphasia.     CN 2-12 Intact.   -Patient has no deviation of baseline vision. Normal Confrontation (CN II)  -Extraocular movements are full, physiologic nystagmus is present. Eyes converge equally and pupils constrict with near focus. (CN III, IV, VI)  -Patient able to fully open and close jaw. Equal sensation over bilateral temples, cheeks, and jawline. (CN V)  -Patient able to raise eyebrows and expand cheeks (CN VII)  -Patient able to lateralize sounds bilaterally (CN VIII)  -Uvula is midline and rises symmetrically with soft palate on phonation, active gag reflex (CN IX, X)  -Patient with equal rise of shoulders and equal strength on resisted rotation of neck b/l. Strength 5/5. (CN XI)  -Patient able to stick out tongue at midline and move side to side, resists against deviation by me (CN XII)  PERRLA. No visual field defects.  Strength 5/5 bilateral upper and lower extremities. No atrophy.     Skin: Skin is warm and dry. Capillary refill takes less than 2 seconds.   Psychiatric: He has a normal mood and affect. Thought content normal.         ED Course   Procedures  Labs Reviewed   CBC W/ AUTO DIFFERENTIAL - Abnormal; Notable for the following components:       Result Value    RBC 3.84 (*)     Hemoglobin 11.1 (*)     Hematocrit 33.9 (*)     RDW 14.8 (*)     MPV 8.9 (*)     Lymph # 0.3 (*)     Gran % 84.1 (*)     Lymph % 4.0 (*)     All other components within normal limits   COMPREHENSIVE METABOLIC PANEL - Abnormal; Notable for the following components:    Sodium 124 (*)     Chloride 90 (*)     CO2 19 (*)     Glucose 118 (*)     Albumin 2.8 (*)     Total Bilirubin 1.1 (*)     AST 47 (*)     All other components within normal limits   URINALYSIS   SARS-COV-2 RNA AMPLIFICATION, QUAL   TROPONIN I   TROPONIN I   TYPE & SCREEN   POCT GLUCOSE MONITORING CONTINUOUS        ECG Results          EKG 12-lead (In process)  Result time 03/04/22 08:37:54     In process by Interface, Lab In [facility] (03/04/22 08:37:54)                 Narrative:    Test Reason : R53.1,    Vent. Rate : 077 BPM     Atrial Rate : 077 BPM     P-R Int : 156 ms          QRS Dur : 078 ms      QT Int : 386 ms       P-R-T Axes : 047 -12 048 degrees     QTc Int : 436 ms    Normal sinus rhythm  Normal ECG  No previous ECGs available    Referred By:             Confirmed By:                             Imaging Results          CT Head Without Contrast (Final result)  Result time 03/04/22 10:56:40    Final result by Mauro Knowles MD (03/04/22 10:56:40)                 Narrative:    CT head without contrast    CLINICAL DATA: Weakness, history of lung carcinoma.    CMS MANDATED QUALITY DATA - CT RADIATION  436    All CT scans at this facility utilize dose modulation, iterative reconstruction, and/or weight based dosing when appropriate to reduce radiation dose to as low as reasonably achievable.    Findings: Thin section axial noncontrast images are compared to a previous MR study from September 2021.    There is detrimental development of several intracranial mass lesions. 1.7 cm hyperdense mass is noted in the left posterior parietal region. There is a 12 mm hyperdense mass at the anteroinferior right temporal lobe. 15 mm round hypodense lesion is noted in the right cerebellum. Findings are highly suspicious for metastatic disease. The hyperdense appearance of the left posterior parietal and right temporal lesion suggests mild intratumoral petechial hemorrhage.    There is no midline shift. Ventricles and sulci are mildly prominent. There are no pathologic extra-axial fluid collections.    There is no evidence of acute ischemia. Changes of moderate chronic microvascular white matter disease are noted. The brainstem is unremarkable.    The calvarium is intact. Irregular 1.9 cm scalp soft tissue mass in the suboccipital region to the left of midline is nonspecific, but stable compared to 2017 and  therefore likely benign.    IMPRESSION:  1. Interval development of intracranial mass lesions as detailed above, highly suspicious for metastatic disease. Follow-up MRI brain with and without contrast should be considered.  2. Moderate chronic microvascular white matter ischemic changes.  3. Scalp soft tissue mass in the suboccipital region is unchanged compared to 2017 and therefore presumed benign.    Electronically signed by:  Mauro Knowles MD  3/4/2022 10:56 AM CST Workstation: [id number]                             X-Ray Chest AP Portable (Final result)  Result time 03/04/22 09:17:32    Final result by Eddi Caceres MD (03/04/22 09:17:32)                 Narrative:    CLINICAL HISTORY:  76 years (1945) Male weakness Weakness & dizziness    TECHNIQUE:  Portable AP radiograph the chest.    COMPARISON:  Most recent radiograph from January 31, 2022.    FINDINGS:  Diffuse scattered rounded pulmonary masses throughout both lungs, similar distribution of the previous exam. Costophrenic angles are seen without effusion. No pneumothorax is identified. The heart is top normal in size. Atheromatous calcifications are seen at the aortic arch. Osseous structures appear unchanged. The visualized upper abdomen is unremarkable.    IMPRESSION:  Diffuse scattered pulmonary masses throughout both lungs, similar distribution the previous exam, these may be slightly increased in size when accounting for differences in imaging technique.                  .            Electronically signed by:  Eddi Caceres MD  3/4/2022 9:17 AM CST Workstation: [id number]                               Medications   0.9%  NaCl infusion (1,000 mLs Intravenous New Bag 3/4/22 6266)     Medical Decision Making:   Initial Assessment:   76-year-old male history significant for hypertension, hyperlipidemia, neuroendocrine lung carcinoma, prior CABG presenting to the emergency room for 48 hours of progressively worsening weakness, multiple  ground level falls.  Patient states that he fell yesterday, and suffered a questionable pathologic proximal right humerus fracture.  He was seen and splinted in this emergency room and scheduled for follow-up with orthopedics today.  However, when he woke up this morning, he was suffered another ground level fall.  He did not hit his head and he is not anticoagulated.  He has no additional complaints, no chest pain, no shortness of breath, no headache or visual changes.  To his knowledge, his lung cancer is not metastatic at this point and he is undergoing chemotherapy actively.   Differential Diagnosis:   Cardiac versus metabolic derangement, metastasis of neuroendocrine tumor, hypoglycemia, infection  Clinical Tests:   Lab Tests: Ordered and Reviewed  Radiological Study: Ordered and Reviewed  ED Management:  76-year-old male history of neuroendocrine lung carcinoma now presenting with 48 hours of progressively worsening weakness, multiple ground level falls.  Notably, patient has proximal right humerus fracture from ground level fall yesterday.    Nontoxic, VSS, hemodynamically stable,  afebrile.  Labs demonstrate mild anemia, hyponatremia, slight elevation of bilirubin, slight decrease of bicarb.  Chest x-ray demonstrates diffuse scattered pulmonary masses in bilateral lung fields, slightly increased from prior exam.  CT head demonstrates multiple intracranial masses new from prior, likely represents metastatic disease although needs MRI for confirmation.  EKG demonstrates normal sinus rhythm without ST or T-wave changes.    Intervention during this visit includes normal saline at 150 mL/hour,     Specialty consult with Hospital Medicine with concern for hyponatremia, new development of intracranial masses.  Plan for admission with metabolic correction, and consult for new intracranial mass.    Disposition:  Admission    I discuss this patient case with the cosigning physician, who agrees with diagnosis and plan  of care.               ED Course as of 03/04/22 1135   Fri Mar 04, 2022   0842 EMS report.  76-year-old male with history of progressive onset weakness over the last 24-48 hours, to ground level falls without head injury or loss of consciousness.  Patient with history of lung cancer, last chemotherapy 2 days prior to presentation.  Not anticoagulated, EKG normal sinus rhythm. [AN]   1129 Discussed patient case with Hospital Medicine.  Hospital Medicine to admit. [AN]      ED Course User Index  [AN] Regan Ureña PA-C             Clinical Impression:   Final diagnoses:  [R53.1] Weakness  [W19.XXXA] Fall, initial encounter (Primary)  [C34.90] Malignant neoplasm of lung, unspecified laterality, unspecified part of lung  [R90.0] Intracranial mass  [S42.411G] Closed supracondylar fracture of right humerus with delayed healing, subsequent encounter          ED Disposition Condition    Admit               Regan Ureña PA-C  03/04/22 1136

## 2022-03-04 NOTE — H&P
[facility] Medicine History & Physical Examination   Patient Name: Kalen Evans  MRN: [id number]  Patient Class: IP- Inpatient   Admission Date: 3/4/2022  8:39 AM  Length of Stay: 0  Attending Physician:   Primary Care Provider: Phuc Pelletier MD  Face-to-Face encounter date: 03/04/2022  Code Status:Full Code  MPOA:  Chief Complaint: Fall (Pt has had multiple falls recently from weakness.  Pt denies hitting his head or LOC)        Patient information was obtained from patient, past medical records and ER records.   HISTORY OF PRESENT ILLNESS:   Kalen Evans is a 76 y.o. old male who  has a past medical history of Hyperlipidemia, Hypertension, and Myocardial infarction.. The patient presented to [facility] on 3/4/2022 with a primary complaint of Fall (Pt has had multiple falls recently from weakness.  Pt denies hitting his head or LOC)  .     76 yr old male known hx of Neuroendocrine cancer,Lung Cancer hyperlipidemia, HTN, prior CABG presents to ED with  Recurrent Mult falls and profound weakness    The patient states he fell yesterday and actually was seen at our facility he was found to have a possible pathological proximal right humerus fracture.  He was seen and splint in ED with followups follow up Orthopedics outpatient basis.  However this morning he sustained another fall.  He denied any head trauma no visual changes no unilateral weakness.    The patient had the 3 weeks ago right humerus pathological fracture and had plastic splint and sling with a swath.  He was supposed to follow-up with orthopedics but was unable to get an appointment present.  He was complaining that the brace/splint was too tight.  Yesterday 82 of unable to get an appointment for 3 weeks out the patient also had pathological rib fractures and was trying to see and oncology orthopedic specialist    Nevertheless in the emergency room he was found to be hyponatremic he was started on IV fluids  orthopedics and heme oncology works consulted    Pain management was provided  REVIEW OF SYSTEMS:   10 Point Review of System was performed and was found to be negative except for that mentioned already in the HPI and   Review of Systems (Negative unless checked off)  Review of Systems   Constitutional: Positive for malaise/fatigue.   HENT: Negative.    Eyes: Negative.    Respiratory: Negative.    Cardiovascular: Negative.    Gastrointestinal: Positive for nausea.   Genitourinary: Negative.    Musculoskeletal: Positive for falls.   Skin: Negative.    Neurological: Positive for dizziness and weakness.   Endo/Heme/Allergies: Negative.    Psychiatric/Behavioral: Negative.            PAST MEDICAL HISTORY:     Past Medical History:   Diagnosis Date    Hyperlipidemia     Hypertension     Myocardial infarction        PAST SURGICAL HISTORY:     Past Surgical History:   Procedure Laterality Date    BACK SURGERY N/A 1967    CARDIAC SURGERY      FRACTURE SURGERY      right wrist    HERNIA REPAIR      ROTATOR CUFF REPAIR         ALLERGIES:   Penicillins    FAMILY HISTORY:     Family History   Problem Relation Age of Onset    Diabetes Mother     Cancer Father         pancreatic    Hypertension Father        SOCIAL HISTORY:     Social History     Tobacco Use    Smoking status: Former Smoker     Packs/day: 0.50     Types: Cigarettes     Start date: 9/10/2015    Smokeless tobacco: Never Used   Substance Use Topics    Alcohol use: Yes     Comment: very rarely        Social History     Substance and Sexual Activity   Sexual Activity Not on file        HOME MEDICATIONS:     Prior to Admission medications    Medication Sig Start Date End Date Taking? Authorizing Provider   amLODIPine-benazepriL (LOTREL) 5-40 mg per capsule Take 1 capsule by mouth once daily.   Yes Historical Provider   aspirin (ECOTRIN) 81 MG EC tablet Take 81 mg by mouth once daily.   Yes Historical Provider   fluticasone (FLONASE) 50 mcg/actuation nasal  spray USE 2 SPRAYS INTO EACH NOSTRIL AS NEEDED  Patient taking differently: 2 sprays by Each Nostril route as needed. 1/10/17  Yes Kay Bills PA-C   glucosamine-chondroitin 250-200 mg Tab Take 1 tablet by mouth once daily.   Yes Historical Provider   HYDROcodone-acetaminophen (NORCO) 5-325 mg per tablet Take 1 tablet by mouth every 4 (four) hours as needed for Pain. 2/7/22  Yes AMALIA Allen   metoprolol succinate (TOPROL-XL) 100 MG 24 hr tablet TAKE ONE TABLET BY MOUTH EVERY DAY  Patient taking differently: Take 100 mg by mouth once daily. 12/31/20  Yes Mitchell Butler MD   multivitamin capsule Take 1 capsule by mouth once daily.   Yes Historical Provider   oxyCODONE-acetaminophen (PERCOCET) 5-325 mg per tablet Take 1 tablet by mouth every 6 (six) hours as needed for Pain. 2/9/22  Yes Da Bass MD   pravastatin (PRAVACHOL) 20 MG tablet Take 1 tablet (20 mg total) by mouth once daily. 10/29/14  Yes Bharath Jackman MD   benzonatate (TESSALON PERLES) 100 MG capsule Take 1 capsule (100 mg total) by mouth 3 (three) times daily as needed for Cough. 1/27/22 2/6/22  R Morgan Zacarias MD   doxycycline (VIBRAMYCIN) 100 MG Cap Take 1 capsule (100 mg total) by mouth 2 (two) times daily. for 10 days 3/3/22 3/13/22  Kim Goddard MD   hydrocortisone (ANUSOL-HC) 25 mg suppository Place 25 mg rectally 2 (two) times daily as needed. 2-4 times QD PRN 10/21/20   Historical Provider   lipase-protease-amylase (ZENPEP) 40,000-126,000- 168,000 unit CpDR Take 2 po with meals, take 1 po with snacks. 11/30/21   SUDHAKAR Zacarias MD   ondansetron (ZOFRAN) 8 MG tablet Take 1 tablet (8 mg total) by mouth every 8 (eight) hours as needed. 9/28/21 9/28/22  LEONEL Bertrand   promethazine (PHENERGAN) 25 MG tablet Take 1 tablet (25 mg total) by mouth every 4 to 6 hours as needed. 9/28/21   LEONEL Bertrand   tadalafiL (CIALIS) 5 MG tablet Take 5 mg by mouth once daily. 9/3/21   Historical Provider         PHYSICAL  "EXAM:   BP (!) 141/76 (BP Location: Left arm, Patient Position: Lying)   Pulse 78   Temp 99 °F (37.2 °C) (Oral)   Resp 18   Ht 5' 9" (1.753 m)   Wt 81.6 kg (180 lb)   SpO2 95%   BMI 26.58 kg/m²   Vitals Reviewed  General appearance:  Ill-appearing male in no apparent distress.  Skin: No Rash.   Neuro: Motor and sensory exams grossly intact. Good tone. Power in all 4 extremities 5/5.   HENT: Atraumatic head. Moist mucous membranes of oral cavity.  Eyes: Normal extraocular movements.   Neck: Supple. No evidence of lymphadenopathy. No thyroidomegaly.  Lungs:  Rhonchi throughout bilaterally. No wheezing present.   Heart: Regular rate and rhythm. S1 and S2 present with positive murmurs/gallop/rub. No pedal edema. No JVD present.   Abdomen: Soft, non-distended, non-tender. No rebound tenderness/guarding. No masses or organomegaly. Bowel sounds are normal. Bladder is not palpable.   Extremities: No cyanosis, clubbing, or edema.  Right upper extremity in sling and swath with cast reinforcing with some skin irritation/erosion  Psych/mental status: Alert and oriented. Cooperative. Responds appropriately to questions.   EMERGENCY DEPARTMENT LABS AND IMAGING:   Following labs were Reviewed   Recent Labs   Lab 03/04/22  0850 03/04/22  1708   WBC 8.09  --    HGB 11.1*  --    HCT 33.9*  --      --    CALCIUM 10.4 10.1   ALBUMIN 2.8*  --    PROT 7.2  --    * 127*   K 4.2 4.0   CO2 19* 23   CL 90* 93*   BUN 13 11   CREATININE 0.7 0.7   ALKPHOS 92  --    ALT 16  --    AST 47*  --    BILITOT 1.1*  --          BMP:   Recent Labs   Lab 03/04/22  0850 03/04/22  1708   * 110   * 127*   K 4.2 4.0   CL 90* 93*   CO2 19* 23   BUN 13 11   CREATININE 0.7 0.7   CALCIUM 10.4 10.1   , CMP   Recent Labs   Lab 03/04/22  0850 03/04/22  1708   * 127*   K 4.2 4.0   CL 90* 93*   CO2 19* 23   * 110   BUN 13 11   CREATININE 0.7 0.7   CALCIUM 10.4 10.1   PROT 7.2  --    ALBUMIN 2.8*  --    BILITOT 1.1*  --  "   ALKPHOS 92  --    AST 47*  --    ALT 16  --    ANIONGAP 15 11   ESTGFRAFRICA >60.0 >60.0   EGFRNONAA >60.0 >60.0   , CBC   Recent Labs   Lab 03/04/22  0850   WBC 8.09   HGB 11.1*   HCT 33.9*      , INR   Lab Results   Component Value Date    INR 1.1 09/10/2021   , Lipid Panel No results found for: CHOL, HDL, LDLCALC, TRIG, CHOLHDL, Troponin   Recent Labs   Lab 03/04/22  0850 03/04/22  1543 03/04/22  1916   TROPONINI 0.034 0.032 0.036   , A1C: No results for input(s): HGBA1C in the last 4320 hours. and All labs within the past 24 hours have been reviewed  Microbiology Results (last 7 days)     ** No results found for the last 168 hours. **        CT Head Without Contrast   Final Result      X-Ray Chest AP Portable   Final Result        X-Ray Humerus 2 View Right    Result Date: 3/3/2022  Two views of the right humerus are compared to a prior study dated 2/7/2022 Clinical history is fracture There is a displaced oblique fracture of the mid humeral diaphysis. 2 cm lateral and posterior displacement of the distal fracture fragment. There is callus formation. There are degenerative changes of the acromioclavicular joint. There is diffuse osteopenia. IMPRESSION: 2 cm lateral and posterior displacement of the oblique fracture of the mid humeral diaphysis. There is mild callus formation. Electronically signed by:  Samantha Swain MD  3/3/2022 3:24 PM CST Workstation: [id number]    X-Ray Humerus 2 View Right    Result Date: 2/9/2022  EXAMINATION: XR HUMERUS 2 VIEW RIGHT CLINICAL HISTORY: Pain in right arm TECHNIQUE: Frontal and lateral views right humerus COMPARISON: 02/07/2022 and 01/04/2022 FINDINGS: Mildly displaced oblique right humeral midshaft fracture with minimal callus formation in the interval.  No detrimental change in alignment.  Bone anchor in the right humeral head.  Moderate degenerative change right AC joint.  Multiple nodules throughout the right lung.     Right humeral diaphysis fracture without  detrimental change.  Pulmonary nodules are suspicious for metastatic disease and better characterized on CT. Electronically signed by: Clovis Mak Date:    02/09/2022 Time:    11:57    X-Ray Humerus 2 View Right    Result Date: 2/7/2022  HISTORY: Right arm pain, lifting injury, lung carcinoma undergoing chemotherapy. FINDINGS: 2 views of the right humerus obtained in 4 images show acute oblique fracture through the mid humeral diaphysis, with up to 17 mm distraction of fracture fragments. There are no other acute fractures. The bones appear diffusely osteopenic. IMPRESSION: Acute oblique mid shaft humeral fracture. Electronically signed by:  Jensen March MD  2/7/2022 11:46 AM CST Workstation: [id number]    CT Head Without Contrast    Result Date: 3/4/2022  CT head without contrast CLINICAL DATA: Weakness, history of lung carcinoma. CMS MANDATED QUALITY DATA - CT RADIATION  436 All CT scans at this facility utilize dose modulation, iterative reconstruction, and/or weight based dosing when appropriate to reduce radiation dose to as low as reasonably achievable. Findings: Thin section axial noncontrast images are compared to a previous MR study from September 2021. There is detrimental development of several intracranial mass lesions. 1.7 cm hyperdense mass is noted in the left posterior parietal region. There is a 12 mm hyperdense mass at the anteroinferior right temporal lobe. 15 mm round hypodense lesion is noted in the right cerebellum. Findings are highly suspicious for metastatic disease. The hyperdense appearance of the left posterior parietal and right temporal lesion suggests mild intratumoral petechial hemorrhage. There is no midline shift. Ventricles and sulci are mildly prominent. There are no pathologic extra-axial fluid collections. There is no evidence of acute ischemia. Changes of moderate chronic microvascular white matter disease are noted. The brainstem is unremarkable. The calvarium is intact.  Irregular 1.9 cm scalp soft tissue mass in the suboccipital region to the left of midline is nonspecific, but stable compared to 2017 and therefore likely benign. IMPRESSION: 1. Interval development of intracranial mass lesions as detailed above, highly suspicious for metastatic disease. Follow-up MRI brain with and without contrast should be considered. 2. Moderate chronic microvascular white matter ischemic changes. 3. Scalp soft tissue mass in the suboccipital region is unchanged compared to 2017 and therefore presumed benign. Electronically signed by:  Mauro Knowles MD  3/4/2022 10:56 AM CST Workstation: [id number]    X-Ray Chest AP Portable    Result Date: 3/4/2022  CLINICAL HISTORY: 76 years (1945) Male weakness Weakness & dizziness TECHNIQUE: Portable AP radiograph the chest. COMPARISON: Most recent radiograph from January 31, 2022. FINDINGS: Diffuse scattered rounded pulmonary masses throughout both lungs, similar distribution of the previous exam. Costophrenic angles are seen without effusion. No pneumothorax is identified. The heart is top normal in size. Atheromatous calcifications are seen at the aortic arch. Osseous structures appear unchanged. The visualized upper abdomen is unremarkable. IMPRESSION: Diffuse scattered pulmonary masses throughout both lungs, similar distribution the previous exam, these may be slightly increased in size when accounting for differences in imaging technique. . Electronically signed by:  Eddi Caceres MD  3/4/2022 9:17 AM [facility] Workstation: [id number]    I personally reviewed and agree with the radiology findings    12 lead EKG reveals a normal sinus rhythm with left axis deviation this poor R-wave progression this no significant ST or T-wave abnormalities rate 77  millisecond  ASSESSMENT & PLAN:   Kalen Evans is a 76 y.o. male admitted for    1. Hyponatremia/weakness  -most likely secondary to lung cancer  -IV hydration with normal saline  -monitor  closely repeat BMP    2. 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   -was on Keytruda infusion   -consult his heme oncologist  -Keppra prophylactically  -seizure precautions  -DuoNeb treatments  -supplemental O2 as needed  -continue supportive care    3. Right Humerus fracture (POA)  -orthopedic surgery consult  -pain management  -fall precautions    4.  Right upper extremity ulcer  -most likely from plastic brace erosion  -wound care consult    5.  Essential hypertension  -continue home medications to manage    6.  History of coronary artery disease status post CABG  -chest pain-free  -cardiac enzymes within normal limits    7.  Hyperlipidemia  -continue home medications to manage    8.  Frequent falls  -PT OT eval and treat    DVT Prophylaxis: will be placed on  for DVT prophylaxis and will be advised to be as mobile as possible and sit in a chair as tolerated.   _____________________________________________________________   Face-to-Face encounter date: 03/04/2022  Encounter included review of the medical records, interviewing and examining the patient face-to-face, discussion with family and other health care providers including emergency medicine physician, admission orders, interpreting lab/test results and formulating a plan of care.   Medical Decision Making during this encounter was  [_] Low Complexity  [_] Moderate Complexity  [x] High Complexity  _________________________________________________________________________________    INPATIENT LIST OF MEDICATIONS     Current Facility-Administered Medications:     0.9%  NaCl infusion, , Intravenous, Continuous, Soco Brady NP, Last Rate: 100 mL/hr at 03/04/22 1722, New Bag at 03/04/22 1722    acetaminophen tablet 650 mg, 650 mg, Oral, Q8H PRN, Soco Brady NP    amLODIPine tablet 5 mg, 5 mg, Oral, Daily, 5 mg at  03/04/22 1725 **AND** benazepriL tablet 40 mg, 40 mg, Oral, Daily, Soco Brady NP, 40 mg at 03/04/22 1724    aspirin EC tablet 81 mg, 81 mg, Oral, Daily, Soco Brady NP, 81 mg at 03/04/22 1724    benzonatate capsule 100 mg, 100 mg, Oral, TID PRN, Soco Brady NP    doxycycline capsule 100 mg, 100 mg, Oral, Q12H, Da Bass MD, 100 mg at 03/04/22 2005    HYDROcodone-acetaminophen 5-325 mg per tablet 1 tablet, 1 tablet, Oral, Q4H PRN, Soco Brady NP, 1 tablet at 03/04/22 1734    levETIRAcetam in NaCl (iso-os) IVPB 500 mg, 500 mg, Intravenous, Q12H, Rob Martinez MD, Stopped at 03/04/22 1625    melatonin tablet 6 mg, 6 mg, Oral, Nightly PRN, Soco Brady NP    metoprolol succinate (TOPROL-XL) 24 hr tablet 100 mg, 100 mg, Oral, Daily, Soco Brady NP, 100 mg at 03/04/22 1724    multivitamin tablet, 1 tablet, Oral, Daily, Soco Brady NP, 1 tablet at 03/04/22 1724    ondansetron injection 4 mg, 4 mg, Intravenous, Q8H PRN, Soco Brady NP    pravastatin tablet 20 mg, 20 mg, Oral, QHS, Soco Brady NP, 20 mg at 03/04/22 2007    promethazine tablet 25 mg, 25 mg, Oral, Q6H PRN, Soco Brady NP    sodium chloride 0.9% flush 10 mL, 10 mL, Intravenous, PRN, Soco Brady NP      Scheduled Meds:  Continuous Infusions:  PRN Meds:.      Soco Brady  [facility] Hospitalist NP  03/04/2022

## 2022-03-04 NOTE — CONSULTS
Formerly [facility], 1928–1983  Consult Note  3/4/2022      Past Medical History:   Diagnosis Date    Hyperlipidemia     Hypertension     Myocardial infarction        Past Surgical History:   Procedure Laterality Date    BACK SURGERY N/A 1967    CARDIAC SURGERY      FRACTURE SURGERY      right wrist    HERNIA REPAIR      ROTATOR CUFF REPAIR           Current Facility-Administered Medications:     0.9%  NaCl infusion, , Intravenous, Continuous, Soco Brady NP    acetaminophen tablet 650 mg, 650 mg, Oral, Q8H PRN, Soco Brady NP    amLODIPine tablet 5 mg, 5 mg, Oral, Daily **AND** benazepriL tablet 40 mg, 40 mg, Oral, Daily, Soco Brady NP    aspirin EC tablet 81 mg, 81 mg, Oral, Daily, Soco Brady NP    benzonatate capsule 100 mg, 100 mg, Oral, TID PRN, Soco Brady NP    HYDROcodone-acetaminophen 5-325 mg per tablet 1 tablet, 1 tablet, Oral, Q4H PRN, Soco Brady NP    levETIRAcetam in NaCl (iso-os) IVPB 500 mg, 500 mg, Intravenous, Q12H, Rob Martinez MD, Last Rate: 200 mL/hr at 03/04/22 1555, 500 mg at 03/04/22 1555    melatonin tablet 6 mg, 6 mg, Oral, Nightly PRN, Soco Brady NP    metoprolol succinate (TOPROL-XL) 24 hr tablet 100 mg, 100 mg, Oral, Daily, Soco Brady NP    multivitamin tablet, 1 tablet, Oral, Daily, Soco Brady NP    ondansetron injection 4 mg, 4 mg, Intravenous, Q8H PRN, Soco Brady NP    pravastatin tablet 20 mg, 20 mg, Oral, QHS, Soco Brady NP    promethazine tablet 25 mg, 25 mg, Oral, Q6H PRN, Soco Brady NP    sodium chloride 0.9% flush 10 mL, 10 mL, Intravenous, PRN, Soco Brady NP    Allergies as of 03/04/2022 - Reviewed 03/04/2022   Allergen Reaction Noted    Penicillins Other (See Comments) 04/23/2013       Family History   Problem Relation Age of Onset    Diabetes Mother     Cancer Father         pancreatic    Hypertension Father        Social History     Socioeconomic History    Marital status:    Occupational  History     Employer: [employer]   Tobacco Use    Smoking status: Former Smoker     Packs/day: 0.50     Types: Cigarettes     Start date: 9/10/2015    Smokeless tobacco: Never Used   Substance and Sexual Activity    Alcohol use: Yes     Comment: very rarely    Drug use: No         HPI:  Patient is a 76-year-old male who has a history of lung carcinoma with metastatic disease to the right humerus patient was seen in my office about 3 weeks ago with a probable pathologic fracture to his right mid shaft humerus.  Patient was seen emergency room today after a fall he did not injure his right arm but he has been complaining of constant pain in the right arm.  Patient was admitted today with dehydration and weakness patient also had a CT scan which showed metastatic disease to his brain.      Review of Systems   Constitution: Negative for chills and fever.   HENT: Negative for headaches and blurry vision.   Cardiovascular: Negative for chest pain, irregular heartbeat, leg swelling and palpitations.   Respiratory: Negative for cough and shortness of breath.   Endocrine: Negative for polyuria.   Hematologic/Lymphatic: Negative for bleeding problem. Does not bruise/bleed easily.   Skin: Negative for poor wound healing and rash.   Musculoskeletal: Negative for joint pain. Negative for arthritis, joint swelling, muscle weakness and myalgias.   Gastrointestinal: Negative for abdominal pain, heartburn, melena, nausea and vomiting.   Genitourinary: Negative for bladder incontinence and dysuria.   Neurological: Negative for numbness.   Psychiatric/Behavioral: Negative for depression. The patient is not nervous/anxious.     PE:  Temp:  [98.2 °F (36.8 °C)-98.8 °F (37.1 °C)] 98.8 °F (37.1 °C)  Pulse:  [73-90] 85  Resp:  [18-36] 18  SpO2:  [93 %-96 %] 95 %  BP: (123-157)/(59-88) 147/88    A&Ox3, NAD  Neck-supple with no pain  RUE-right upper extremity is neurovascularly intact he is in a sugar-tong splint  LUE-no swelling or  deformity  Pelvis-no pelvic pain hip pain  Back-no back pain  RLE-no swelling or deformity  LLE-no swelling or deformity    Xrays:  X-ray of the right upper arm shows a with appears to be a pathologic fracture of the midshaft of the right humerus.  There is some displacement and row shin of the bone  Imaging Results          CT Head Without Contrast (Final result)  Result time 03/04/22 10:56:40    Final result by Mauro Knowles MD (03/04/22 10:56:40)                 Narrative:    CT head without contrast    CLINICAL DATA: Weakness, history of lung carcinoma.    CMS MANDATED QUALITY DATA - CT RADIATION  436    All CT scans at this facility utilize dose modulation, iterative reconstruction, and/or weight based dosing when appropriate to reduce radiation dose to as low as reasonably achievable.    Findings: Thin section axial noncontrast images are compared to a previous MR study from September 2021.    There is detrimental development of several intracranial mass lesions. 1.7 cm hyperdense mass is noted in the left posterior parietal region. There is a 12 mm hyperdense mass at the anteroinferior right temporal lobe. 15 mm round hypodense lesion is noted in the right cerebellum. Findings are highly suspicious for metastatic disease. The hyperdense appearance of the left posterior parietal and right temporal lesion suggests mild intratumoral petechial hemorrhage.    There is no midline shift. Ventricles and sulci are mildly prominent. There are no pathologic extra-axial fluid collections.    There is no evidence of acute ischemia. Changes of moderate chronic microvascular white matter disease are noted. The brainstem is unremarkable.    The calvarium is intact. Irregular 1.9 cm scalp soft tissue mass in the suboccipital region to the left of midline is nonspecific, but stable compared to 2017 and therefore likely benign.    IMPRESSION:  1. Interval development of intracranial mass lesions as detailed above, highly  suspicious for metastatic disease. Follow-up MRI brain with and without contrast should be considered.  2. Moderate chronic microvascular white matter ischemic changes.  3. Scalp soft tissue mass in the suboccipital region is unchanged compared to 2017 and therefore presumed benign.    Electronically signed by:  Mauro Knowles MD  3/4/2022 10:56 AM [facility] Workstation: [id number]                             X-Ray Chest AP Portable (Final result)  Result time 03/04/22 09:17:32    Final result by Eddi Caceres MD (03/04/22 09:17:32)                 Narrative:    CLINICAL HISTORY:  76 years (1945) Male weakness Weakness & dizziness    TECHNIQUE:  Portable AP radiograph the chest.    COMPARISON:  Most recent radiograph from January 31, 2022.    FINDINGS:  Diffuse scattered rounded pulmonary masses throughout both lungs, similar distribution of the previous exam. Costophrenic angles are seen without effusion. No pneumothorax is identified. The heart is top normal in size. Atheromatous calcifications are seen at the aortic arch. Osseous structures appear unchanged. The visualized upper abdomen is unremarkable.    IMPRESSION:  Diffuse scattered pulmonary masses throughout both lungs, similar distribution the previous exam, these may be slightly increased in size when accounting for differences in imaging technique.                  .            Electronically signed by:  Eddi Caceres MD  3/4/2022 9:17 AM [facility] Workstation: [id number]                              Labs:    Recent Results (from the past 24 hour(s))   CBC auto differential    Collection Time: 03/04/22  8:50 AM   Result Value Ref Range    WBC 8.09 3.90 - 12.70 K/uL    RBC 3.84 (L) 4.60 - 6.20 M/uL    Hemoglobin 11.1 (L) 14.0 - 18.0 g/dL    Hematocrit 33.9 (L) 40.0 - 54.0 %    MCV 88 82 - 98 fL    MCH 28.9 27.0 - 31.0 pg    MCHC 32.7 32.0 - 36.0 g/dL    RDW 14.8 (H) 11.5 - 14.5 %    Platelets 356 150 - 450 K/uL    MPV 8.9 (L) 9.2 - 12.9 fL     Immature Granulocytes 0.5 0.0 - 0.5 %    Gran # (ANC) 6.8 1.8 - 7.7 K/uL    Immature Grans (Abs) 0.04 0.00 - 0.04 K/uL    Lymph # 0.3 (L) 1.0 - 4.8 K/uL    Mono # 0.8 0.3 - 1.0 K/uL    Eos # 0.1 0.0 - 0.5 K/uL    Baso # 0.03 0.00 - 0.20 K/uL    nRBC 0 0 /100 WBC    Gran % 84.1 (H) 38.0 - 73.0 %    Lymph % 4.0 (L) 18.0 - 48.0 %    Mono % 10.3 4.0 - 15.0 %    Eosinophil % 0.7 0.0 - 8.0 %    Basophil % 0.4 0.0 - 1.9 %    Differential Method Automated    Comprehensive metabolic panel    Collection Time: 03/04/22  8:50 AM   Result Value Ref Range    Sodium 124 (L) 136 - 145 mmol/L    Potassium 4.2 3.5 - 5.1 mmol/L    Chloride 90 (L) 95 - 110 mmol/L    CO2 19 (L) 23 - 29 mmol/L    Glucose 118 (H) 70 - 110 mg/dL    BUN 13 8 - 23 mg/dL    Creatinine 0.7 0.5 - 1.4 mg/dL    Calcium 10.4 8.7 - 10.5 mg/dL    Total Protein 7.2 6.0 - 8.4 g/dL    Albumin 2.8 (L) 3.5 - 5.2 g/dL    Total Bilirubin 1.1 (H) 0.1 - 1.0 mg/dL    Alkaline Phosphatase 92 55 - 135 U/L    AST 47 (H) 10 - 40 U/L    ALT 16 10 - 44 U/L    Anion Gap 15 8 - 16 mmol/L    eGFR if African American >60.0 >60 mL/min/1.73 m^2    eGFR if non African American >60.0 >60 mL/min/1.73 m^2   Type & Screen    Collection Time: 03/04/22  8:50 AM   Result Value Ref Range    Group & Rh O NEG     Indirect Jake NEG    COVID-19 Rapid Screening    Collection Time: 03/04/22  8:50 AM   Result Value Ref Range    SARS-CoV-2 RNA, Amplification, Qual Negative Negative   Troponin I    Collection Time: 03/04/22  8:50 AM   Result Value Ref Range    Troponin I 0.034 <=0.040 ng/mL   Urinalysis    Collection Time: 03/04/22 10:44 AM   Result Value Ref Range    Specimen UA Urine, Clean Catch     Color, UA Yellow Yellow, Straw, Yanci    Appearance, UA Clear Clear    pH, UA 7.0 5.0 - 8.0    Specific Gravity, UA 1.010 1.005 - 1.030    Protein, UA Negative Negative    Glucose, UA Negative Negative    Ketones, UA Negative Negative    Bilirubin (UA) Negative Negative    Occult Blood UA Negative  Negative    Nitrite, UA Negative Negative    Urobilinogen, UA Negative Negative EU/dL    Leukocytes, UA Negative Negative       Assessment/Plan:  Pathologic fracture of the right humerus.  The patient has end-stage disease has multiple metastatic lesions a pathologic fracture to the right upper arm and brain metastasis.  The patient right now is comfortable in the splint but he complains of pain with any type of movement.  Patient was admitted with dehydration and weakness.  Patient is could be treated medically over the weekend we are considering IM rodding to hopefully secure his right humerus fracture.  I discussed this situation with the daughter who is okay with proceeding with the surgery can.  We will also consult his oncologist.  If we schedule his surgery will try to put him on for Monday.

## 2022-03-05 PROBLEM — C79.31 METASTASIS TO BRAIN: Status: ACTIVE | Noted: 2022-01-01

## 2022-03-05 NOTE — NURSING
Medicated for c/o pain with hydrocodeine.   Pt c/o pain to right arm associated with fx. Humerus. Right fingers are mildly edematous. Pt. Moves   fingers well. Pt. Was seen by Geraldo Bass this afternoon who stated he may take him to surgery on Monday.

## 2022-03-05 NOTE — CONSULTS
Pending sale to [facility]   Hematology/Oncology  Inpatient Consult Note          Patient Name: Kalen Evans  MRN: [id number]  Admission Date: 3/4/2022  Hospital Length of Stay: 1 days  Code Status: Full Code   Attending Provider: Rob Martinez MD  Referring Provider: Michelle  Consulting Provider: Chepe Epps MD  Primary Care Physician: Phuc Pelletier MD  Principal Problem:Hyponatremia        Coverage for Dr SUDHAKAR Zacarias        Consults  Subjective:     Chief Complaint: Fall (Pt has had multiple falls recently from weakness.  Pt denies hitting his head or LOC)          History Present Illness:  76 y.o. 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 He presented to the ED after sustaining several falls and profound weakness. He had a right humeral fracture as a result of one of the falls. He has been admitted to the hospitalist service. CT of the head showing development of several intracranial masses consistent with brain mets.    He was treated with six cycles of chemotherapy with Carboplatin and VP16 and found to have progressive cancer on subsequent scans despite therapy. He was started on immunologic therapy with Keytuda and received one cycle; however, he was subsequently referred to Dr Nguyen with rad/onc for palliative XRT due to progressive enlargement in the large chest mass with direct erosion into the 2nd through 4th ribs on the left, and also with lytic lesion being seen in the 3rd rib and a suspected fracture pathologically at the 6th rib on the left. He completed ten treatments of XRT on 1/2/2022 and resumed keytruda on 3/2/22.      Patient is sitting up in bed; Right arm is in brace. He denies any CP,S OB, HAs or N/V. There are no family members currently at bedside. I discussed with Dr Peña in person and  communicated with Dr Lindo.          Past Medical/Surgical History:  Past Medical History:   Diagnosis Date    Hyperlipidemia     Hypertension     Myocardial infarction      Past Surgical History:   Procedure Laterality Date    BACK SURGERY N/A 1967    CARDIAC SURGERY      FRACTURE SURGERY      right wrist    HERNIA REPAIR      ROTATOR CUFF REPAIR           Allergies:  Review of patient's allergies indicates:   Allergen Reactions    Penicillins Other (See Comments)     Stomach upset, cramping       Social/Family History:  Social History     Socioeconomic History    Marital status:    Occupational History     Employer: [employer]   Tobacco Use    Smoking status: Former Smoker     Packs/day: 0.50     Types: Cigarettes     Start date: 9/10/2015    Smokeless tobacco: Never Used   Substance and Sexual Activity    Alcohol use: Yes     Comment: very rarely    Drug use: No     Family History   Problem Relation Age of Onset    Diabetes Mother     Cancer Father         pancreatic    Hypertension Father          ROS:    GEN: generalized malaise, weakness, fatigue, balance issues;pain fro rib fractures and right arm fracture  HEENT: normal with no HA's, sore throat, stiff neck, changes in vision  CV: normal with no CP, SOB, PND, ACOSTA or orthopnea  PULM: normal with no SOB, cough, hemoptysis, sputum or pleuritic pain  GI: normal with no abdominal pain, nausea, vomiting, constipation, diarrhea, melanotic stools, BRBPR, or hematemesis  : normal with no hematuria, dysuria  BREAST: normal with no mass, discharge, pain  SKIN: normal with no rash, erythema, bruising, or swelling        Medications:  Continuous Infusions:   sodium chloride 0.9% 100 mL/hr at 03/05/22 0342     Scheduled Meds:   albuterol-ipratropium  3 mL Nebulization Q6H    amLODIPine  5 mg Oral Daily    And    benazepriL  40 mg Oral Daily    aspirin  81 mg Oral Daily    doxycycline  100 mg Oral Q12H    levETIRAcetam in NaCl (iso-os)   "500 mg Intravenous Q12H    metoprolol succinate  100 mg Oral Daily    multivitamin  1 tablet Oral Daily    pravastatin  20 mg Oral QHS     PRN Meds:acetaminophen, benzonatate, HYDROcodone-acetaminophen, melatonin, ondansetron, promethazine, sodium chloride 0.9%         Objective:       Physical Exam:    Vitals:  Blood pressure 116/76, pulse 77, temperature 98.1 °F (36.7 °C), temperature source Oral, resp. rate 18, height 5' 9" (1.753 m), weight 81.6 kg (180 lb), SpO2 (!) 94 %.    GEN: no apparent distress, comfortable; AAOx3  HEAD: atraumatic and normocephalic  EYES: no pallor, no icterus, PERRLA  ENT: OMM, no pharyngeal erythema, external ears WNL; no nasal discharge; no thrush  NECK: no masses, thyroid normal, trachea midline, no LAD/LN's, supple  CV: RRR with no murmur; normal pulse; normal S1 and S2; no pedal edema  CHEST: Normal respiratory effort; CTAB; normal breath sounds; no wheeze or crackles; O2 per NC  ABDOM: nontender and nondistended; soft; normal bowel sounds; no rebound/guarding  MUSC/Skeletal: ROM normal; no crepitus; joints normal; no deformities or arthropathy  EXTREM: no clubbing, cyanosis, inflammation ; right arm in brace; IV LUE  SKIN: no rashes, lesions, ulcers, petechiae or subcutaneous nodules  :   Whitaker with yellow urine  NEURO: grossly intact; motor/sensory WNL; AAOx3; no tremors  PSYCH: normal mood, affect and behavior  LYMPH: normal cervical, supraclavicular, axillary and groin LN's      Lab Review:   Lab Results   Component Value Date    WBC 10.30 03/05/2022    HGB 11.2 (L) 03/05/2022    HCT 33.6 (L) 03/05/2022    MCV 88 03/05/2022     03/05/2022         CMP  Sodium   Date Value Ref Range Status   03/05/2022 127 (L) 136 - 145 mmol/L Final     Potassium   Date Value Ref Range Status   03/05/2022 3.5 3.5 - 5.1 mmol/L Final     Chloride   Date Value Ref Range Status   03/05/2022 92 (L) 95 - 110 mmol/L Final     CO2   Date Value Ref Range Status   03/05/2022 23 23 - 29 mmol/L " Final     Glucose   Date Value Ref Range Status   03/05/2022 112 (H) 70 - 110 mg/dL Final     BUN   Date Value Ref Range Status   03/05/2022 9 8 - 23 mg/dL Final     Creatinine   Date Value Ref Range Status   03/05/2022 0.6 0.5 - 1.4 mg/dL Final     Calcium   Date Value Ref Range Status   03/05/2022 9.9 8.7 - 10.5 mg/dL Final     Total Protein   Date Value Ref Range Status   03/05/2022 6.8 6.0 - 8.4 g/dL Final     Albumin   Date Value Ref Range Status   03/05/2022 2.7 (L) 3.5 - 5.2 g/dL Final     Total Bilirubin   Date Value Ref Range Status   03/05/2022 1.2 (H) 0.1 - 1.0 mg/dL Final     Comment:     For infants and newborns, interpretation of results should be based  on gestational age, weight and in agreement with clinical  observations.    Premature Infant recommended reference ranges:  Up to 24 hours.............<8.0 mg/dL  Up to 48 hours............<12.0 mg/dL  3-5 days..................<15.0 mg/dL  6-29 days.................<15.0 mg/dL       Alkaline Phosphatase   Date Value Ref Range Status   03/05/2022 89 55 - 135 U/L Final     AST   Date Value Ref Range Status   03/05/2022 45 (H) 10 - 40 U/L Final     ALT   Date Value Ref Range Status   03/05/2022 15 10 - 44 U/L Final     Anion Gap   Date Value Ref Range Status   03/05/2022 12 8 - 16 mmol/L Final     eGFR if    Date Value Ref Range Status   03/05/2022 >60.0 >60 mL/min/1.73 m^2 Final     eGFR if non    Date Value Ref Range Status   03/05/2022 >60.0 >60 mL/min/1.73 m^2 Final     Comment:     Calculation used to obtain the estimated glomerular filtration  rate (eGFR) is the CKD-EPI equation.            Diagnostic Results:  CT Head Without Contrast [[id number]] Collected: 03/04/22 1026   Order Status: Completed Updated: 03/04/22 1058   Narrative:     CT head without contrast     CLINICAL DATA: Weakness, history of lung carcinoma.     CMS MANDATED QUALITY DATA - CT RADIATION  436     All CT scans at this facility utilize dose  modulation, iterative reconstruction, and/or weight based dosing when appropriate to reduce radiation dose to as low as reasonably achievable.     Findings: Thin section axial noncontrast images are compared to a previous MR study from September 2021.     There is detrimental development of several intracranial mass lesions. 1.7 cm hyperdense mass is noted in the left posterior parietal region. There is a 12 mm hyperdense mass at the anteroinferior right temporal lobe. 15 mm round hypodense lesion is noted in the right cerebellum. Findings are highly suspicious for metastatic disease. The hyperdense appearance of the left posterior parietal and right temporal lesion suggests mild intratumoral petechial hemorrhage.     There is no midline shift. Ventricles and sulci are mildly prominent. There are no pathologic extra-axial fluid collections.     There is no evidence of acute ischemia. Changes of moderate chronic microvascular white matter disease are noted. The brainstem is unremarkable.     The calvarium is intact. Irregular 1.9 cm scalp soft tissue mass in the suboccipital region to the left of midline is nonspecific, but stable compared to 2017 and therefore likely benign.     IMPRESSION:   1. Interval development of intracranial mass lesions as detailed above, highly suspicious for metastatic disease. Follow-up MRI brain with and without contrast should be considered.   2. Moderate chronic microvascular white matter ischemic changes.   3. Scalp soft tissue mass in the suboccipital region is unchanged compared to 2017 and therefore presumed benign.      X-Ray Chest AP Portable [[id number]] Collected: 03/04/22 0833   Order Status: Completed Updated: 03/04/22 0919   Narrative:     CLINICAL HISTORY:   76 years (1945) Male weakness Weakness & dizziness     TECHNIQUE:   Portable AP radiograph the chest.     COMPARISON:   Most recent radiograph from January 31, 2022.     FINDINGS:   Diffuse scattered rounded  pulmonary masses throughout both lungs, similar distribution of the previous exam. Costophrenic angles are seen without effusion. No pneumothorax is identified. The heart is top normal in size. Atheromatous calcifications are seen at the aortic arch. Osseous structures appear unchanged. The visualized upper abdomen is unremarkable.     IMPRESSION:   Diffuse scattered pulmonary masses throughout both lungs, similar distribution the previous exam, these may be slightly increased in size when accounting for differences in imaging technique.          Assessment/Plan:     IMPRESSION:  (1) 76 y.o. 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     3/5/2022:  - He presented to the ED after sustaining several falls and profound weakness. He had a right humeral fracture as a result of one of the falls. He has been admitted to the hospitalist service.   - CT of the head showing development of several intracranial masses consistent with brain mets.       Brief Oncology Summary:  - He was treated with six cycles of chemotherapy with Carboplatin and VP16 and found to have progressive cancer on subsequent scans despite therapy.   - He was started on immunologic therapy with Keytuda and received one cycle; however, he was subsequently referred to Dr Nguyen with rad/onc for palliative XRT due to progressive enlargement in the large chest mass with direct erosion into the 2nd through 4th ribs on the left, and also with lytic lesion being seen in the 3rd rib and a suspected fracture pathologically at the 6th rib on the left.  -  He completed ten treatments of XRT on 1/2/2022 and resumed keytruda on 3/2/22.     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    (5) Former Smoker (quit  2015)    (6) Mild borderline anemia          Active Diagnoses:    Diagnosis Date Noted POA    PRINCIPAL PROBLEM:  Hyponatremia [E87.1] 03/04/2022 Yes    Brain lesion [G93.9] 03/04/2022 Yes    Right supracondylar humerus fracture, sequela [S42.411S] 03/04/2022 Not Applicable    Arm wound, right, sequela [S41.101S] 03/04/2022 Not Applicable    Falls frequently [R29.6] 03/04/2022 Not Applicable    Neuroendocrine carcinoma of lung [C7A.8] 09/25/2021 Yes    S/P CABG x 4 [Z95.1] 11/12/2013 Not Applicable      Problems Resolved During this Admission:           PLAN:   1. Decadron steroids to reduce the swelling from the brain mets  2. Rad/onc consult for XRT to the brain mets  3. Ortho consult to evaluate the right humerus fracture  4. Will follow with you - Dr SUDHAKAR Zacarias returns on Monday 3/7 and will assume hematologic/oncologic management at that time        Thank you for your consult.      Chepe Epps MD  Hematology/Oncology  [facility]

## 2022-03-05 NOTE — CONSULTS
Inpatient consult to Radiation Oncology  Consult performed by: Dylon Lindo Jr., MD  Consult ordered by: Chepe Epps MD         Kalen Evans  [id number]  1945  3/5/2022  Aaareferral Self  No address on file    REASON FOR CONSULTATION: Metastatic large cell carcinoma with neuroendocrine features    TREATMENT GOAL: palliative    HISTORY OF PRESENT ILLNESS:   76 y.o. male patient of my partner, Dr. 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  He was treated with upfront carboplatin/etoposide x 6c by Dr. Zacarias with progressive disease, changed over to Keytruda when he began complaining of pain in the right chest.  He completed palliative radiotherapy to the right upper lobe/mediastinum/ribs, 30 Gy in 10 fractions on January 21, 2022.  After systemic therapy delay, he resumed 2nd cycle of q3wk Keytruda 3/2/22.    He now presents to emergency room complaining of falls and weakness.  He has seen Dr. Bass for pathologic fracture of the right humerus with plans for intramedullary tatianna next week.  CT of the head demonstrates a 1.7 cm mass in the left posterior parietal region, 1.2 cm hyperdense mass in the right temporal lobe, 1.5 cm mass in the right cerebellum; again noted is 1.9 cm left suboccipital scalp mass stable compared to 2017--likely benign.    Dr. Epps met with the patient recommended initiation of steroids and radiation oncology consultation.    Patient endorses profound global fatigue and weakness causing falls.  He denies loss of consciousness, headache, seizure activity, focal numbness or weakness.  He denies incontinence or retention of stool/urine.  He endorses pain in the right upper extremity.    Review of systems otherwise negative unless indicated in HPI.    Past Medical History:   Diagnosis Date    Hyperlipidemia     Hypertension     Myocardial infarction      Past Surgical History:   Procedure Laterality Date    BACK SURGERY N/A 1967     CARDIAC SURGERY      FRACTURE SURGERY      right wrist    HERNIA REPAIR      ROTATOR CUFF REPAIR       Social History     Socioeconomic History    Marital status:    Occupational History     Employer: [employer]   Tobacco Use    Smoking status: Former Smoker     Packs/day: 0.50     Types: Cigarettes     Start date: 9/10/2015    Smokeless tobacco: Never Used   Substance and Sexual Activity    Alcohol use: Yes     Comment: very rarely    Drug use: No     Family History   Problem Relation Age of Onset    Diabetes Mother     Cancer Father         pancreatic    Hypertension Father        PRIOR HISTORY OF CHEMOTHERAPY OR RADIOTHERAPY: Please see HPI for patients prior oncologic history.    Current Discharge Medication List      CONTINUE these medications which have NOT CHANGED    Details   amLODIPine-benazepriL (LOTREL) 5-40 mg per capsule Take 1 capsule by mouth once daily.      aspirin (ECOTRIN) 81 MG EC tablet Take 81 mg by mouth once daily.      fluticasone (FLONASE) 50 mcg/actuation nasal spray USE 2 SPRAYS INTO EACH NOSTRIL AS NEEDED  Qty: 16 g, Refills: 3      glucosamine-chondroitin 250-200 mg Tab Take 1 tablet by mouth once daily.      HYDROcodone-acetaminophen (NORCO) 5-325 mg per tablet Take 1 tablet by mouth every 4 (four) hours as needed for Pain.  Qty: 12 tablet, Refills: 0      metoprolol succinate (TOPROL-XL) 100 MG 24 hr tablet TAKE ONE TABLET BY MOUTH EVERY DAY  Qty: 90 tablet, Refills: 1      multivitamin capsule Take 1 capsule by mouth once daily.      oxyCODONE-acetaminophen (PERCOCET) 5-325 mg per tablet Take 1 tablet by mouth every 6 (six) hours as needed for Pain.  Qty: 30 tablet, Refills: 0    Comments: Quantity prescribed more than 7 day supply? Yes, quantity medically necessary  Associated Diagnoses: Closed nondisplaced segmental fracture of shaft of right humerus, initial encounter      pravastatin (PRAVACHOL) 20 MG tablet Take 1 tablet (20 mg total) by mouth once  daily.  Qty: 30 tablet, Refills: 11    Associated Diagnoses: Hyperlipemia      benzonatate (TESSALON PERLES) 100 MG capsule Take 1 capsule (100 mg total) by mouth 3 (three) times daily as needed for Cough.  Qty: 30 capsule, Refills: 2    Associated Diagnoses: Neuroendocrine carcinoma of lung      doxycycline (VIBRAMYCIN) 100 MG Cap Take 1 capsule (100 mg total) by mouth 2 (two) times daily. for 10 days  Qty: 20 capsule, Refills: 0      hydrocortisone (ANUSOL-HC) 25 mg suppository Place 25 mg rectally 2 (two) times daily as needed. 2-4 times QD PRN      lipase-protease-amylase (ZENPEP) 40,000-126,000- 168,000 unit CpDR Take 2 po with meals, take 1 po with snacks.  Qty: 80 capsule, Refills: 2    Associated Diagnoses: Secondary pancreatic insufficiency; Diarrhea due to malabsorption      ondansetron (ZOFRAN) 8 MG tablet Take 1 tablet (8 mg total) by mouth every 8 (eight) hours as needed.  Qty: 30 tablet, Refills: 5    Associated Diagnoses: Neuroendocrine carcinoma of lung      promethazine (PHENERGAN) 25 MG tablet Take 1 tablet (25 mg total) by mouth every 4 to 6 hours as needed.  Qty: 30 tablet, Refills: 5    Associated Diagnoses: Neuroendocrine carcinoma of lung      tadalafiL (CIALIS) 5 MG tablet Take 5 mg by mouth once daily.           Review of patient's allergies indicates:   Allergen Reactions    Penicillins Other (See Comments)     Stomach upset, cramping       QUALITY OF LIFE: 40%- Disabled: Requires Special Care and Assistance    Vitals:    03/05/22 0339 03/05/22 0655 03/05/22 0731 03/05/22 0852   BP: (!) 158/82 116/76  125/74   Pulse: 77 77 74 76   Resp: 18 18 20    Temp: 98 °F (36.7 °C) 98.1 °F (36.7 °C)     TempSrc: Oral Oral     SpO2: 97% (!) 94% 95%    Weight:       Height:         Body mass index is 26.58 kg/m².    PHYSICAL EXAM:   GENERAL: alert; in no apparent distress.   HEAD: normocephalic, atraumatic.  EYES: pupils are equal, round, reactive to light and accommodation. Sclera anicteric.    NOSE/THROAT: no nasal erythema or rhinorrhea.   NECK: no cervical motion rigidity; supple with no masses.    CHEST: Patient is speaking comfortably on O2 by NC with normal work of breathing without using accessory muscles of respiration.  CARDIOVASCULAR: regular rate and rhythm  ABDOMEN: soft, nontender, nondistended.   MUSCULOSKELETAL:  Right arm in sling with restricted range of motion  NEUROLOGIC: cranial nerves II-XII intact bilaterally.  No appreciable deficits with exam limited at right arm  EXTREMITIES: no clubbing, cyanosis, edema.  SKIN: no erythema, rashes, ulcerations noted.     REVIEW OF IMAGING/PATHOLOGY/LABS: Please see HPI. All images reviewed personally by dictating physician.     ASSESSMENT: 76 y.o. male with metastatic large cell carcinoma with neuroendocrine features.  PLAN:  Kalen Evans presents as above.    1.  Inpatient management per hospitalist team.  2.  Anticipate intramedullary tatianna at E next week with Dr. Bass.  3.  Continue Decadron for cerebral edema; recommend 4 mg Q 6 hours.  4.  Recommend MRI of the brain with and without contrast for completion staging.  5.  No emergent inpatient radiotherapy indicated.  6.  Recommend whole-brain radiotherapy, 30 Gy in 10 fractions as well as postoperative treatment of right humeral tatianna with that course to begin 1-2 weeks postop.  7.  Contact information provided.  8.  RN to coordinate outpatient follow-up with Dr. Nguyen and CT simulation for planned whole-brain radiotherapy.  9.  Will confer with Kevin Zacarias and [place].  Recommend hold further Keytruda until completion of radiotherapy.     The patient has our contact information and understands that they are free to contact us at any time with questions or concerns regarding radiation therapy.     DISPOSITION: RTC FOR CT SIM after d/c from hospital     I have personally seen and evaluated this patient with a high complexity diagnosis.      Greater than 60 minutes were dedicated to  reviewing/interpreting pertinent laboratory/imaging/pathology as well as prior consultations; reviewing and performing history and physical; counseling patient on oncologic recommendations; documentation in the electronic medical record including ordering of additional tests and/or radiation treatment protocol; and coordination of care with physicians with referrals placed as appropriate.     COVID-19 precautions discussed. [facility] policy for COVID-19 testing described.  Patient will be required to wear a mask when in the Cancer Center.    PHYSICIAN: Dylon Lindo Jr, MD    Thank you for the opportunity to meet and consult with Kalen Evans.   Please feel free to contact me to discuss the above recommendation further.          .

## 2022-03-05 NOTE — CARE UPDATE
03/05/22 0731   Patient Assessment/Suction   Level of Consciousness (AVPU) alert   Respiratory Effort Unlabored;Normal   Expansion/Accessory Muscles/Retractions no use of accessory muscles   All Lung Fields Breath Sounds diminished   Rhythm/Pattern, Respiratory unlabored;pattern regular   PRE-TX-O2   O2 Device (Oxygen Therapy) High Flow nasal Cannula   $ Is the patient on Low Flow Oxygen? Yes   Flow (L/min) 2   SpO2 95 %   Pulse Oximetry Type Intermittent   $ Pulse Oximetry - Multiple Charge Pulse Oximetry - Multiple   Pulse 74   Resp 20   Aerosol Therapy   $ Aerosol Therapy Charges Aerosol Treatment   Daily Review of Necessity (SVN) completed   Respiratory Treatment Status (SVN) given   Treatment Route (SVN) mask;oxygen   Patient Position (SVN) HOB elevated   Post Treatment Assessment (SVN) breath sounds unchanged   Signs of Intolerance (SVN) none   Equipment Change   Nebulizer Changed? Yes  (new setup)   Education   $ Education Bronchodilator;15 min   Respiratory Evaluation   $ Care Plan Tech Time 15 min   $ Eval/Re-eval Charges Evaluation

## 2022-03-05 NOTE — HOSPITAL COURSE
76 yr old male known hx of Neuroendocrine cancer,Lung Cancer hyperlipidemia, HTN, prior CABG presents to ED with  Recurrent Mult falls and profound weakness     The patient states he fell yesterday and actually was seen at our facility he was found to have a possible pathological proximal right humerus fracture.  He was seen and splint in ED with followups follow up Orthopedics outpatient basis.  However this morning he sustained another fall.  He denied any head trauma no visual changes no unilateral weakness.     The patient had the 3 weeks ago right humerus pathological fracture and had plastic splint and sling with a swath.  He was supposed to follow-up with orthopedics but was unable to get an appointment present.  He was complaining that the brace/splint was too tight.  Yesterday 82 of unable to get an appointment for 3 weeks out the patient also had pathological rib fractures and was trying to see and oncology orthopedic specialist     Nevertheless in the emergency room he was found to be hyponatremic he was started on IV fluids orthopedics and heme oncology works consulted    Hospital course  Patient was admitted with lung cancer metastasis to the brain and also pathological fracture of the right arm.  ORIF was done by the orthopedic surgeon and uneventful.  Patient was started on Decadron to decrease brain swelling .  Patient was seen by the oncologist and was planning to give brain irradiation but later patient quickly declined and patient and family daughter decided to go for hospice and discharged with home hospice.  Continued Decadron at discharge.

## 2022-03-05 NOTE — SUBJECTIVE & OBJECTIVE
Interval History:     Review of Systems   Constitutional:  Negative for chills.   Endocrine: Negative for polyuria.   Allergic/Immunologic: Positive for immunocompromised state.   Neurological:  Negative for dizziness.   Objective:     Vital Signs (Most Recent):  Temp: 97.5 °F (36.4 °C) (03/05/22 1138)  Pulse: 71 (03/05/22 1138)  Resp: 19 (03/05/22 1138)  BP: 135/77 (03/05/22 1138)  SpO2: (!) 93 % (03/05/22 1138)   Vital Signs (24h Range):  Temp:  [97.5 °F (36.4 °C)-99 °F (37.2 °C)] 97.5 °F (36.4 °C)  Pulse:  [71-85] 71  Resp:  [18-23] 19  SpO2:  [93 %-97 %] 93 %  BP: (116-158)/(67-88) 135/77     Weight: 81.6 kg (180 lb)  Body mass index is 26.58 kg/m².    Intake/Output Summary (Last 24 hours) at 3/5/2022 1146  Last data filed at 3/5/2022 1140  Gross per 24 hour   Intake 480 ml   Output 2200 ml   Net -1720 ml      Physical Exam  Constitutional:       General: He is not in acute distress.     Appearance: He is well-developed.   HENT:      Head: Normocephalic.   Eyes:      Pupils: Pupils are equal, round, and reactive to light.   Cardiovascular:      Rate and Rhythm: Normal rate and regular rhythm.   Pulmonary:      Effort: Pulmonary effort is normal. No respiratory distress.   Abdominal:      General: There is no distension.   Musculoskeletal:      Cervical back: Neck supple.   Skin:     General: Skin is warm.      Findings: No rash.   Neurological:      Mental Status: He is alert and oriented to person, place, and time.   Psychiatric:         Mood and Affect: Mood normal.       Significant Labs: All pertinent labs within the past 24 hours have been reviewed.  CBC:   Recent Labs   Lab 03/04/22  0850 03/05/22  0622   WBC 8.09 10.30   HGB 11.1* 11.2*   HCT 33.9* 33.6*    346     CMP:   Recent Labs   Lab 03/04/22  0850 03/04/22  1708 03/05/22  0622   * 127* 127*   K 4.2 4.0 3.5   CL 90* 93* 92*   CO2 19* 23 23   * 110 112*   BUN 13 11 9   CREATININE 0.7 0.7 0.6   CALCIUM 10.4 10.1 9.9   PROT 7.2  --   6.8   ALBUMIN 2.8*  --  2.7*   BILITOT 1.1*  --  1.2*   ALKPHOS 92  --  89   AST 47*  --  45*   ALT 16  --  15   ANIONGAP 15 11 12   EGFRNONAA >60.0 >60.0 >60.0     Cardiac Markers: No results for input(s): CKMB, MYOGLOBIN, BNP, TROPISTAT in the last 48 hours.    Significant Imaging: I have reviewed all pertinent imaging results/findings within the past 24 hours.

## 2022-03-05 NOTE — PLAN OF CARE
[facility]  Initial Discharge Assessment       Primary Care Provider: Phuc Pelletier MD    Admission Diagnosis: Hyponatremia [E87.1]    Admission Date: 3/4/2022  Expected Discharge Date: 3/6/2022    Discharge Barriers Identified: (P) None    Discharge planning pending medical condition and potential for surgical intervention.    Payor: [employer] MANAGED MEDICARE / Plan: [employer] CHOICES 65 / Product Type: Medicare Advantage /     Extended Emergency Contact Information  Primary Emergency Contact: Noemy Evans  Address: [address] [place] [address] [id number] [place] [address]  Home Phone: [phone]  Mobile Phone: [phone]  Relation: Spouse  Preferred language: English   needed? No  Secondary Emergency Contact: SANTAMARIABILL  Mobile Phone: [phone]  Relation: Daughter  Preferred language: English   needed? No    Discharge Plan A: (P) Other (TBD)  Discharge Plan B: (P) Other      [facility] [id number] [facility]  [address] [address] [address]  Phone: [phone] Fax: [phone]      Initial Assessment (most recent)       Adult Discharge Assessment - 03/05/22 1146          Discharge Assessment    Assessment Type Discharge Planning Assessment     Source of Information health record     Reason For Admission Hyponatremia     Lives With spouse     Facility Arrived From: Home     Prior to hospitilization cognitive status: Alert/Oriented     Current cognitive status: Alert/Oriented     Readmission within 30 days? No     Patient currently being followed by outpatient case management? No     Do you currently have service(s) that help you manage your care at home? No     Do you take prescription medications? Yes (P)      Do you have prescription coverage? Yes (P)      Coverage PHN (P)      Do you have any problems affording any of your prescribed medications? No (P)      Is the patient taking medications as  prescribed? yes (P)      How do you get to doctors appointments? family or friend will provide (P)      Are you on dialysis? No (P)      Do you take coumadin? No (P)      Discharge Plan A Other (P)    TBD    Discharge Plan B Other (P)      DME Needed Upon Discharge  other (see comments) (P)    TBD    Discharge Barriers Identified None (P)         Relationship/Environment    Name(s) of Who Lives With Patient Georgina Kimbrough (Significant other)   [phone] (P)

## 2022-03-06 NOTE — CARE UPDATE
03/06/22 0719   Patient Assessment/Suction   Level of Consciousness (AVPU) alert   Respiratory Effort Unlabored;Normal   Expansion/Accessory Muscles/Retractions no use of accessory muscles   All Lung Fields Breath Sounds clear;diminished   Rhythm/Pattern, Respiratory unlabored;pattern regular;depth regular   PRE-TX-O2   O2 Device (Oxygen Therapy) High Flow nasal Cannula   $ Is the patient on Low Flow Oxygen? Yes   Flow (L/min) 2   SpO2 (!) 93 %   Pulse Oximetry Type Intermittent   $ Pulse Oximetry - Multiple Charge Pulse Oximetry - Multiple   Pulse 73   Resp 20   Aerosol Therapy   $ Aerosol Therapy Charges Aerosol Treatment   Daily Review of Necessity (SVN) completed   Respiratory Treatment Status (SVN) given   Treatment Route (SVN) mask   Patient Position (SVN) HOB elevated   Post Treatment Assessment (SVN) breath sounds unchanged;vital signs unchanged   Signs of Intolerance (SVN) none   Education   $ Education 15 min;Bronchodilator   Respiratory Evaluation   $ Care Plan Tech Time 15 min   $ Eval/Re-eval Charges Re-evaluation

## 2022-03-06 NOTE — PLAN OF CARE
Problem: Adult Inpatient Plan of Care  Goal: Plan of Care Review  Outcome: Ongoing, Progressing  Goal: Absence of Hospital-Acquired Illness or Injury  Outcome: Ongoing, Progressing  Goal: Optimal Comfort and Wellbeing  Outcome: Ongoing, Progressing  Goal: Readiness for Transition of Care  Outcome: Ongoing, Progressing     Problem: Skin Injury Risk Increased  Goal: Skin Health and Integrity  Outcome: Ongoing, Progressing     Problem: Fall Injury Risk  Goal: Absence of Fall and Fall-Related Injury  Outcome: Ongoing, Progressing

## 2022-03-06 NOTE — CARE UPDATE
03/05/22 1922   Patient Assessment/Suction   Level of Consciousness (AVPU) responds to voice   Respiratory Effort Normal;Unlabored   Expansion/Accessory Muscles/Retractions no use of accessory muscles   All Lung Fields Breath Sounds diminished   PRE-TX-O2   O2 Device (Oxygen Therapy) nasal cannula   Flow (L/min) 2   SpO2 97 %   Pulse Oximetry Type Intermittent   $ Pulse Oximetry - Multiple Charge Pulse Oximetry - Multiple   Aerosol Therapy   $ Aerosol Therapy Charges Aerosol Treatment   Respiratory Treatment Status (SVN) given   Treatment Route (SVN) mask;oxygen   Post Treatment Assessment (SVN) breath sounds unchanged   Signs of Intolerance (SVN) none   Breath Sounds Post-Respiratory Treatment   Throughout All Fields Post-Treatment All Fields   Throughout All Fields Post-Treatment no change   Education   $ Education 15 min   Respiratory Evaluation   $ Care Plan Tech Time 15 min

## 2022-03-06 NOTE — NURSING
Attempt to reposition pt. From side to side has been unsuccessful.  He repeatedly turns to his back after placing pillows to keep him on his side.

## 2022-03-06 NOTE — PROGRESS NOTES
"[facility]   Hematology/Oncology  Inpatient Progress Note          Patient Name: Kalen Evans  MRN: [id number]  Admission Date: 3/4/2022  Hospital Length of Stay: 2 days  Code Status: Full Code   Attending Provider: Kwesi Peña MD  Consulting Provider: Chepe Epps MD  Primary Care Physician: Phuc Pelletier MD  Principal Problem:Hyponatremia      Subjective:       Patient ID: Kalen Evans is a 76 y.o. male.    Chief Complaint: Fall (Pt has had multiple falls recently from weakness.  Pt denies hitting his head or LOC)        History Present Illness:    Patient sitting up in bed; awake and alert; he denies any current CP, SOB, HA's or N/V; his arm is in brace and he seems to not be in any significant pain; I discussed with floor staff      Review of Systems:  GEN: generalized malaise, weakness, fatigue, balance issues;pain fro rib fractures and right arm fracture  HEENT: normal with no HA's, sore throat, stiff neck, changes in vision  CV: normal with no CP, SOB, PND, ACOSTA or orthopnea  PULM: normal with no SOB, cough, hemoptysis, sputum or pleuritic pain  GI: normal with no abdominal pain, nausea, vomiting, constipation, diarrhea, melanotic stools, BRBPR, or hematemesis  : normal with no hematuria, dysuria  BREAST: normal with no mass, discharge, pain  SKIN: normal with no rash, erythema, bruising, or swelling      Objective:     Vitals:  Blood pressure 131/68, pulse 73, temperature 96.6 °F (35.9 °C), temperature source Oral, resp. rate 20, height 5' 9" (1.753 m), weight 81.6 kg (180 lb), SpO2 (!) 93 %.    Physical Exam:  GEN: no apparent distress, comfortable; AAOx3  HEAD: atraumatic and normocephalic  EYES: no pallor, no icterus, PERRLA  ENT: OMM, no pharyngeal erythema, external ears WNL; no nasal discharge; no thrush  NECK: no masses, thyroid normal, trachea midline, no LAD/LN's, supple  CV: RRR with no murmur; normal pulse; normal S1 and S2; no pedal edema  CHEST: Normal respiratory " effort; CTAB; normal breath sounds; no wheeze or crackles; O2 per NC  ABDOM: nontender and nondistended; soft; normal bowel sounds; no rebound/guarding  MUSC/Skeletal: ROM normal; no crepitus; joints normal; no deformities or arthropathy  EXTREM: no clubbing, cyanosis, inflammation ; right arm in brace; IV LUE  SKIN: no rashes, lesions, ulcers, petechiae or subcutaneous nodules  :   Whitaker with yellow urine  NEURO: grossly intact; motor/sensory WNL; AAOx3; no tremors  PSYCH: normal mood, affect and behavior  LYMPH: normal cervical, supraclavicular, axillary and groin LN's          Lab Review:        Lab Results   Component Value Date    WBC 12.90 (H) 03/06/2022    HGB 12.3 (L) 03/06/2022    HCT 36.1 (L) 03/06/2022    MCV 85 03/06/2022     03/06/2022       CMP  Sodium   Date Value Ref Range Status   03/06/2022 126 (L) 136 - 145 mmol/L Final     Potassium   Date Value Ref Range Status   03/06/2022 3.9 3.5 - 5.1 mmol/L Final     Chloride   Date Value Ref Range Status   03/06/2022 92 (L) 95 - 110 mmol/L Final     CO2   Date Value Ref Range Status   03/06/2022 21 (L) 23 - 29 mmol/L Final     Glucose   Date Value Ref Range Status   03/06/2022 159 (H) 70 - 110 mg/dL Final     BUN   Date Value Ref Range Status   03/06/2022 14 8 - 23 mg/dL Final     Creatinine   Date Value Ref Range Status   03/06/2022 0.5 0.5 - 1.4 mg/dL Final     Calcium   Date Value Ref Range Status   03/06/2022 10.3 8.7 - 10.5 mg/dL Final     Total Protein   Date Value Ref Range Status   03/06/2022 7.1 6.0 - 8.4 g/dL Final     Albumin   Date Value Ref Range Status   03/06/2022 2.7 (L) 3.5 - 5.2 g/dL Final     Total Bilirubin   Date Value Ref Range Status   03/06/2022 0.8 0.1 - 1.0 mg/dL Final     Comment:     For infants and newborns, interpretation of results should be based  on gestational age, weight and in agreement with clinical  observations.    Premature Infant recommended reference ranges:  Up to 24 hours.............<8.0 mg/dL  Up to 48  hours............<12.0 mg/dL  3-5 days..................<15.0 mg/dL  6-29 days.................<15.0 mg/dL       Alkaline Phosphatase   Date Value Ref Range Status   03/06/2022 96 55 - 135 U/L Final     AST   Date Value Ref Range Status   03/06/2022 35 10 - 40 U/L Final     ALT   Date Value Ref Range Status   03/06/2022 16 10 - 44 U/L Final     Anion Gap   Date Value Ref Range Status   03/06/2022 13 8 - 16 mmol/L Final     eGFR if    Date Value Ref Range Status   03/06/2022 >60.0 >60 mL/min/1.73 m^2 Final     eGFR if non    Date Value Ref Range Status   03/06/2022 >60.0 >60 mL/min/1.73 m^2 Final     Comment:     Calculation used to obtain the estimated glomerular filtration  rate (eGFR) is the CKD-EPI equation.              Radiology Diagnostic Studies:     CT Head Without Contrast [[id number]] Collected: 03/04/22 1026   Order Status: Completed Updated: 03/04/22 1058   Narrative:     CT head without contrast     CLINICAL DATA: Weakness, history of lung carcinoma.     CMS MANDATED QUALITY DATA - CT RADIATION  436     All CT scans at this facility utilize dose modulation, iterative reconstruction, and/or weight based dosing when appropriate to reduce radiation dose to as low as reasonably achievable.     Findings: Thin section axial noncontrast images are compared to a previous MR study from September 2021.     There is detrimental development of several intracranial mass lesions. 1.7 cm hyperdense mass is noted in the left posterior parietal region. There is a 12 mm hyperdense mass at the anteroinferior right temporal lobe. 15 mm round hypodense lesion is noted in the right cerebellum. Findings are highly suspicious for metastatic disease. The hyperdense appearance of the left posterior parietal and right temporal lesion suggests mild intratumoral petechial hemorrhage.     There is no midline shift. Ventricles and sulci are mildly prominent. There are no pathologic extra-axial fluid  collections.     There is no evidence of acute ischemia. Changes of moderate chronic microvascular white matter disease are noted. The brainstem is unremarkable.     The calvarium is intact. Irregular 1.9 cm scalp soft tissue mass in the suboccipital region to the left of midline is nonspecific, but stable compared to 2017 and therefore likely benign.     IMPRESSION:   1. Interval development of intracranial mass lesions as detailed above, highly suspicious for metastatic disease. Follow-up MRI brain with and without contrast should be considered.   2. Moderate chronic microvascular white matter ischemic changes.   3. Scalp soft tissue mass in the suboccipital region is unchanged compared to 2017 and therefore presumed benign.       X-Ray Chest AP Portable [[id number]] Collected: 03/04/22 0833   Order Status: Completed Updated: 03/04/22 0919   Narrative:     CLINICAL HISTORY:   76 years (1945) Male weakness Weakness & dizziness     TECHNIQUE:   Portable AP radiograph the chest.     COMPARISON:   Most recent radiograph from January 31, 2022.     FINDINGS:   Diffuse scattered rounded pulmonary masses throughout both lungs, similar distribution of the previous exam. Costophrenic angles are seen without effusion. No pneumothorax is identified. The heart is top normal in size. Atheromatous calcifications are seen at the aortic arch. Osseous structures appear unchanged. The visualized upper abdomen is unremarkable.     IMPRESSION:   Diffuse scattered pulmonary masses throughout both lungs, similar distribution the previous exam, these may be slightly increased in size when accounting for differences in imaging technique.                  Assessment:     IMPRESSION:    (1) 76 y.o. 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      3/5/2022:  - He  presented to the ED after sustaining several falls and profound weakness. He had a right humeral fracture as a result of one of the falls. He has been admitted to the hospitalist service.   - CT of the head showing development of several intracranial masses consistent with brain mets.     3/6/2022:  - patient seen by rad/onc and ortho - I communicated with Dr Lindo yesterday  - will need stabilization of the humerus    - continue steroids  - Dr Munguia planning for outpatient XRT        Brief Oncology Summary:  - He was treated with six cycles of chemotherapy with Carboplatin and VP16 and found to have progressive cancer on subsequent scans despite therapy.   - He was started on immunologic therapy with Keytuda and received one cycle; however, he was subsequently referred to Dr Nguyen with rad/onc for palliative XRT due to progressive enlargement in the large chest mass with direct erosion into the 2nd through 4th ribs on the left, and also with lytic lesion being seen in the 3rd rib and a suspected fracture pathologically at the 6th rib on the left.  -  He completed ten treatments of XRT on 1/2/2022 and resumed keytruda on 3/2/22.      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 Fall, initial encounter    2. Weakness    3. Malignant neoplasm of lung, unspecified laterality, unspecified part of lung    4. Intracranial mass    5. Closed supracondylar fracture of right humerus with delayed healing, subsequent encounter    6. Hyponatremia    7. Brain lesion           Plan:     PLAN:          1. continue decadron steroids to reduce the swelling from the brain mets  2. appreciate Rad/onc consult - Dr Lindo planning for outpatient XRT  3. appreciate ortho consult for the right humerus fracture  4. Will follow with you - Dr SUDHAKAR Zacarias returns on Monday 3/7 and will  assume hematologic/oncologic management starting at 8:30am               Chepe Epps MD  Hematology/Oncology  [facility]

## 2022-03-06 NOTE — PROGRESS NOTES
[facility] Medicine  Progress Note    Patient Name: Kalen Eavns  MRN: [id number]  Patient Class: IP- Inpatient   Admission Date: 3/4/2022  Length of Stay: 2 days  Attending Physician: Kwesi Peña MD  Primary Care Provider: Phuc Pelletier MD        Subjective:     Principal Problem:Hyponatremia        HPI:  No notes on file    Overview/Hospital Course:  Patient was seen and examined along with Dr. Epps  He is awake alert and oriented.  Discussed in detail about his brain lesions possible metastasis.  He want to continue the treatment    3/6  Slight drowsy   But patient is very intelligent and can answer the questions appropriately  Discussed with possible side effects with memory loss status post radiation and he is aware of that      Interval History:     Review of Systems   Eyes:  Negative for visual disturbance.   Allergic/Immunologic: Positive for immunocompromised state.   Neurological:  Negative for headaches.   Objective:     Vital Signs (Most Recent):  Temp: 97.7 °F (36.5 °C) (03/06/22 1142)  Pulse: (!) 57 (03/06/22 1142)  Resp: 18 (03/06/22 1142)  BP: 117/62 (03/06/22 1142)  SpO2: (!) 93 % (03/06/22 1142)   Vital Signs (24h Range):  Temp:  [96.6 °F (35.9 °C)-98 °F (36.7 °C)] 97.7 °F (36.5 °C)  Pulse:  [57-87] 57  Resp:  [18-20] 18  SpO2:  [93 %-98 %] 93 %  BP: (117-131)/(62-79) 117/62     Weight: 81.6 kg (180 lb)  Body mass index is 26.58 kg/m².    Intake/Output Summary (Last 24 hours) at 3/6/2022 1416  Last data filed at 3/6/2022 0800  Gross per 24 hour   Intake 540 ml   Output 850 ml   Net -310 ml      Physical Exam  Constitutional:       General: He is not in acute distress.     Appearance: He is well-developed.   HENT:      Head: Normocephalic.      Nose: Nose normal.   Eyes:      Pupils: Pupils are equal, round, and reactive to light.   Cardiovascular:      Rate and Rhythm: Normal rate and regular rhythm.   Pulmonary:      Effort: Pulmonary effort is normal. No  respiratory distress.   Abdominal:      General: There is no distension.      Tenderness: There is no abdominal tenderness.   Musculoskeletal:         General: Normal range of motion.      Cervical back: Neck supple.   Skin:     Findings: No rash.   Neurological:      Mental Status: He is alert and oriented to person, place, and time.   Psychiatric:         Mood and Affect: Mood normal.       Significant Labs: All pertinent labs within the past 24 hours have been reviewed.  BMP:   Recent Labs   Lab 03/06/22  0435   *   *   K 3.9   CL 92*   CO2 21*   BUN 14   CREATININE 0.5   CALCIUM 10.3     CBC:   Recent Labs   Lab 03/05/22  0622 03/06/22  0435   WBC 10.30 12.90*   HGB 11.2* 12.3*   HCT 33.6* 36.1*    356     CMP:   Recent Labs   Lab 03/04/22  1708 03/05/22  0622 03/06/22  0435   * 127* 126*   K 4.0 3.5 3.9   CL 93* 92* 92*   CO2 23 23 21*    112* 159*   BUN 11 9 14   CREATININE 0.7 0.6 0.5   CALCIUM 10.1 9.9 10.3   PROT  --  6.8 7.1   ALBUMIN  --  2.7* 2.7*   BILITOT  --  1.2* 0.8   ALKPHOS  --  89 96   AST  --  45* 35   ALT  --  15 16   ANIONGAP 11 12 13   EGFRNONAA >60.0 >60.0 >60.0       Significant Imaging: I have reviewed all pertinent imaging results/findings within the past 24 hours.      Assessment/Plan:      Active Hospital Problems    Diagnosis    *Hyponatremia from cancer possiblie SIADH     most likely Metastasis to brain    Right supracondylar humerus fracture, sequela    Arm wound, right, sequela    Falls frequently    Neuroendocrine carcinoma of lung    S/P CABG x 4       1. Hyponatremia/weakness  2. Probable new metastases to the brain in the patient in patient with history of large cell carcinoma with neuroendocrine features, stage IV disease with intrapulmonary metastases.      3. Right Humerus fracture (POA)      4.  Right upper extremity ulcer      5.  Essential hypertension      6.  History of coronary artery disease status post  CABG  7.  Hyperlipidemia   8.  Frequent falls     PLAN   Patient was to continued active treatment  Radiation Oncology will arrange OP  for whole brain radiation  Continue to monitor hyponatremia. Fluid restrict   On  decadron   -seizure precautions  Follow orthopedic surgery  For possible ORIF on Monday   Typically he is hospice candidate   Recommended by oncologist  to hold keytruda at this time .  Patient does not have CP or CAD . CXR clear except cancer spread.  Revised cardiac risk index pre op risk index score is 0.cleared with moderate risk        VTE Risk Mitigation (From admission, onward)         Ordered     IP VTE LOW RISK PATIENT  Once         03/04/22 1321     Place sequential compression device  Until discontinued         03/04/22 1321                Discharge Planning   MEENA: 3/6/2022     Code Status: Full Code   Is the patient medically ready for discharge?:     Reason for patient still in hospital (select all that apply): Treatment  Discharge Plan A: Other (TBD)                  Kwesi Peña MD  Department of Hospital Medicine   [facility]

## 2022-03-06 NOTE — SUBJECTIVE & OBJECTIVE
Interval History:     Review of Systems   Eyes:  Negative for visual disturbance.   Allergic/Immunologic: Positive for immunocompromised state.   Neurological:  Negative for headaches.   Objective:     Vital Signs (Most Recent):  Temp: 97.7 °F (36.5 °C) (03/06/22 1142)  Pulse: (!) 57 (03/06/22 1142)  Resp: 18 (03/06/22 1142)  BP: 117/62 (03/06/22 1142)  SpO2: (!) 93 % (03/06/22 1142)   Vital Signs (24h Range):  Temp:  [96.6 °F (35.9 °C)-98 °F (36.7 °C)] 97.7 °F (36.5 °C)  Pulse:  [57-87] 57  Resp:  [18-20] 18  SpO2:  [93 %-98 %] 93 %  BP: (117-131)/(62-79) 117/62     Weight: 81.6 kg (180 lb)  Body mass index is 26.58 kg/m².    Intake/Output Summary (Last 24 hours) at 3/6/2022 1416  Last data filed at 3/6/2022 0800  Gross per 24 hour   Intake 540 ml   Output 850 ml   Net -310 ml      Physical Exam  Constitutional:       General: He is not in acute distress.     Appearance: He is well-developed.   HENT:      Head: Normocephalic.      Nose: Nose normal.   Eyes:      Pupils: Pupils are equal, round, and reactive to light.   Cardiovascular:      Rate and Rhythm: Normal rate and regular rhythm.   Pulmonary:      Effort: Pulmonary effort is normal. No respiratory distress.   Abdominal:      General: There is no distension.      Tenderness: There is no abdominal tenderness.   Musculoskeletal:         General: Normal range of motion.      Cervical back: Neck supple.   Skin:     Findings: No rash.   Neurological:      Mental Status: He is alert and oriented to person, place, and time.   Psychiatric:         Mood and Affect: Mood normal.       Significant Labs: All pertinent labs within the past 24 hours have been reviewed.  BMP:   Recent Labs   Lab 03/06/22  0435   *   *   K 3.9   CL 92*   CO2 21*   BUN 14   CREATININE 0.5   CALCIUM 10.3     CBC:   Recent Labs   Lab 03/05/22  0622 03/06/22  0435   WBC 10.30 12.90*   HGB 11.2* 12.3*   HCT 33.6* 36.1*    356     CMP:   Recent Labs   Lab 03/04/22  1862  03/05/22  0622 03/06/22  0435   * 127* 126*   K 4.0 3.5 3.9   CL 93* 92* 92*   CO2 23 23 21*    112* 159*   BUN 11 9 14   CREATININE 0.7 0.6 0.5   CALCIUM 10.1 9.9 10.3   PROT  --  6.8 7.1   ALBUMIN  --  2.7* 2.7*   BILITOT  --  1.2* 0.8   ALKPHOS  --  89 96   AST  --  45* 35   ALT  --  15 16   ANIONGAP 11 12 13   EGFRNONAA >60.0 >60.0 >60.0       Significant Imaging: I have reviewed all pertinent imaging results/findings within the past 24 hours.

## 2022-03-06 NOTE — NURSING
Found skin tear on patient buttocks while assisting to restroom, reminded patient the necissity to turn in bed so as no to develop a bed sore. Patient verbalized understanding but states they do not wish to turn. Repeated this throughout the day but patient was still refusing to be turned. Will continue to monitor.

## 2022-03-07 NOTE — PLAN OF CARE
Important Message from Medicare was verbally explained and given to patient/caregiver on 03/07/2022 at 9:02am    Spoke to patient at bedside, unable to sign IMM due to arm in sling     addressed any questions or concerns.    Important Message from Medicare document will be scanned into patient's medical record

## 2022-03-07 NOTE — PLAN OF CARE
03/07/22 0726   Patient Assessment/Suction   Level of Consciousness (AVPU) alert   Respiratory Effort Unlabored   All Lung Fields Breath Sounds diminished;clear   PRE-TX-O2   O2 Device (Oxygen Therapy) room air   SpO2 95 %   Pulse Oximetry Type Intermittent   $ Pulse Oximetry - Multiple Charge Pulse Oximetry - Multiple   Pulse 70   Resp 18   Aerosol Therapy   $ Aerosol Therapy Charges PRN treatment not required   Respiratory Evaluation   $ Care Plan Tech Time 15 min

## 2022-03-07 NOTE — NURSING
"Patient is very agitated regarding NPO status, attempted to educate patient, pt stated "I'm in Dr. Bass's office and you can call them so they can tell you that I can drink water". Pt's wife was called to notify her of pt's agitation, stated she is coming to sit with him soon. Will monitor and continue NPO status  "

## 2022-03-07 NOTE — OP NOTE
[facility]  Orthopedic Surgery   Operative Note    SUMMARY     Date of Procedure: 3/7/2022     Procedure:  IM rodding right humeral shaft pathologic fracture   Surgeon(s) and Role:     * Da Bass MD - Primary    Assistant:  Roel Dowell    Pre-Operative Diagnosis: Fracture, humerus [S42.309A] pathologic fracture right humerus shaft    Post-Operative Diagnosis: -Op Diagnosis Codes:  Pathologic fracture right humeral shaft   * Fracture, humerus [S42.309A]    Anesthesia: General      Estimated Blood Loss (EBL): * No values recorded between 3/7/2022 11:47 AM and 3/7/2022  1:45 PM *           Implants:   Implant Name Type Inv. Item Serial No.  Lot No. LRB No. Used Action   LOG [id number] - Neronote 3.5 CANNULATED SCREW SET - 1           LOG [id number] - [employer]eronote SMALL FRAGMENT - 1           Nail     [id number] Right 1 Implanted   reaming tatianna    SYNTHES [id number] Right 1 Implanted and Explanted       Specimens:   Specimen (24h ago, onward)            None           Complications:  None           Description of the Procedure:  The patient was given a general anesthetic and placed in a beach chair position arms and legs were well protected neck was well supported.  The right shoulder was then prepped Hibiclens and ChloraPrep and draped in a sterile manner the whole right upper extremity was prepped and draped in a sterile manner.  An anterolateral incision was made near the tip of the acromion the but deltoid muscle was dissected and then dissection was made down to the supraspinatus tendon the supraspinatus tendon was incised and then a guide pin placed near the articular margin of the humeral head it was placed into the medullary canal a 10 mm Reamer was then placed onto the guide pin and we went into the medullary canal.  A guidewire was then placed across the proximal part of the humerus across the pathologic fracture site and then into the distal segment the tatianna was measured at 270 mm tatianna was  then inserted into the medullary canal a lateral incision was made and a locking screw placed proximally another attempt was made to put a locking screw proximally but this was unsuccessful due to the poor sub support the poor quality of the bone they for proximal 1 which was placed in previous appeared to be adequate.  Attention was then turned to the distal locking screw the arm was abducted from the bed and then a perfect Quinault was found under fluoroscopy and a 26 mm screw was placed into the distal segment of the tatianna locking the tatianna in place x-rays show good placement of the tatianna in both the AP and lateral planes the wounds were irrigated with normal saline solution and they were repair of the rotator cuff was repaired with interrupted 0 Vicryl stitch 0 Vicryl was placed into the deltoid subcu was closed with 3-0 Vicryl stitch and then skin staples placed in the skin the other incisions were closed with skin staples a soft dressing was then applied to the proximal incision soft dressing along with an Ace wrap was placed on the other incisions the arm was then placed in a sling and swath.  The patient was stable to recovery
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
## 2022-03-07 NOTE — ANESTHESIA PREPROCEDURE EVALUATION
03/07/2022  Kalen Evans is a 76 y.o., male.      Patient Active Problem List   Diagnosis    Hyperlipidemia    Back pain    DDD (degenerative disc disease), lumbar    Lumbar spinal stenosis    Herniated lumbar intervertebral disc    Lumbosacral radiculopathy    Nicotine dependence    S/P CABG x 4    Chronic abdominal pain    Pseudocyst of pancreas    Pulmonary lesion    Pulmonary nodule    HTN (hypertension)    Chronic allergic rhinitis    Osteoarthritis of left knee    Obesity, Class II, BMI 35-39.9, with comorbidity    Seborrheic keratoses    Neck pain    Neuroendocrine carcinoma of lung    Chemotherapy induced neutropenia    Hyponatremia from cancer possiblie SIADH     Right supracondylar humerus fracture, sequela    Arm wound, right, sequela    Falls frequently    most likely Metastasis to brain       Past Surgical History:   Procedure Laterality Date    BACK SURGERY N/A 1967    CARDIAC SURGERY      FRACTURE SURGERY      right wrist    HERNIA REPAIR      ROTATOR CUFF REPAIR          Tobacco Use:  The patient  reports that he has quit smoking. His smoking use included cigarettes. He started smoking about 6 years ago. He smoked 0.50 packs per day. He has never used smokeless tobacco.     Results for orders placed or performed during the hospital encounter of 03/04/22   EKG 12-lead    Collection Time: 03/04/22  8:35 AM    Narrative    Test Reason : R53.1,    Vent. Rate : 077 BPM     Atrial Rate : 077 BPM     P-R Int : 156 ms          QRS Dur : 078 ms      QT Int : 386 ms       P-R-T Axes : 047 -12 048 degrees     QTc Int : 436 ms    Normal sinus rhythm  Normal ECG  No previous ECGs available  Confirmed by Norbert Arce MD ([id number]) on 3/7/2022 6:18:43 AM    Referred By:             Confirmed By:Norbert Arce MD        Imaging Results          CT Head Without Contrast (Final  result)  Result time 03/04/22 10:56:40    Final result by Mauro Knowles MD (03/04/22 10:56:40)                 Narrative:    CT head without contrast    CLINICAL DATA: Weakness, history of lung carcinoma.    CMS MANDATED QUALITY DATA - CT RADIATION  436    All CT scans at this facility utilize dose modulation, iterative reconstruction, and/or weight based dosing when appropriate to reduce radiation dose to as low as reasonably achievable.    Findings: Thin section axial noncontrast images are compared to a previous MR study from September 2021.    There is detrimental development of several intracranial mass lesions. 1.7 cm hyperdense mass is noted in the left posterior parietal region. There is a 12 mm hyperdense mass at the anteroinferior right temporal lobe. 15 mm round hypodense lesion is noted in the right cerebellum. Findings are highly suspicious for metastatic disease. The hyperdense appearance of the left posterior parietal and right temporal lesion suggests mild intratumoral petechial hemorrhage.    There is no midline shift. Ventricles and sulci are mildly prominent. There are no pathologic extra-axial fluid collections.    There is no evidence of acute ischemia. Changes of moderate chronic microvascular white matter disease are noted. The brainstem is unremarkable.    The calvarium is intact. Irregular 1.9 cm scalp soft tissue mass in the suboccipital region to the left of midline is nonspecific, but stable compared to 2017 and therefore likely benign.    IMPRESSION:  1. Interval development of intracranial mass lesions as detailed above, highly suspicious for metastatic disease. Follow-up MRI brain with and without contrast should be considered.  2. Moderate chronic microvascular white matter ischemic changes.  3. Scalp soft tissue mass in the suboccipital region is unchanged compared to 2017 and therefore presumed benign.    Electronically signed by:  Mauro Knowles MD  3/4/2022 10:56 AM CST  Workstation: [id number]                             X-Ray Chest AP Portable (Final result)  Result time 03/04/22 09:17:32    Final result by Eddi Caceres MD (03/04/22 09:17:32)                 Narrative:    CLINICAL HISTORY:  76 years (1945) Male weakness Weakness & dizziness    TECHNIQUE:  Portable AP radiograph the chest.    COMPARISON:  Most recent radiograph from January 31, 2022.    FINDINGS:  Diffuse scattered rounded pulmonary masses throughout both lungs, similar distribution of the previous exam. Costophrenic angles are seen without effusion. No pneumothorax is identified. The heart is top normal in size. Atheromatous calcifications are seen at the aortic arch. Osseous structures appear unchanged. The visualized upper abdomen is unremarkable.    IMPRESSION:  Diffuse scattered pulmonary masses throughout both lungs, similar distribution the previous exam, these may be slightly increased in size when accounting for differences in imaging technique.                  .            Electronically signed by:  Eddi Caceres MD  3/4/2022 9:17 AM CST Workstation: [id number]                               Lab Results   Component Value Date    WBC 21.76 (H) 03/07/2022    HGB 11.8 (L) 03/07/2022    HCT 35.6 (L) 03/07/2022    MCV 89 03/07/2022     03/07/2022     BMP  Lab Results   Component Value Date     (L) 03/07/2022    K 4.1 03/07/2022    CL 91 (L) 03/07/2022    CO2 23 03/07/2022    BUN 21 03/07/2022    CREATININE 0.6 03/07/2022    CALCIUM 10.4 03/07/2022    ANIONGAP 12 03/07/2022     (H) 03/07/2022     (H) 03/06/2022     (H) 03/05/2022       No results found for this or any previous visit.            Pre-op Assessment    I have reviewed the Patient Summary Reports.     I have reviewed the Nursing Notes. I have reviewed the NPO Status.   I have reviewed the Medications.     Review of Systems  Anesthesia Hx:  No problems with previous Anesthesia Denies Hx of Anesthetic  complications  Denies Family Hx of Anesthesia complications.   Denies Personal Hx of Anesthesia complications.   Social:  Former Smoker    Hematology/Oncology:         -- Anemia: Current/Recent Cancer. chemotherapy Oncology Comments: lung     EENT/Dental:EENT/Dental Normal   Cardiovascular:   Hypertension Past MI CAD asymptomatic CABG/stent  hyperlipidemia ECG has been reviewed.    Pulmonary:  Pulmonary Normal    Renal/:  Renal/ Normal     Hepatic/GI:  Hepatic/GI Normal    Musculoskeletal:   Arthritis   Spine Disorders: lumbar and cervical    Neurological:   Neuromuscular Disease,    Endocrine:  Endocrine Normal    Psych:  Psychiatric Normal           Physical Exam  General: Well nourished    Airway:  Mallampati: II   Mouth Opening: Normal  TM Distance: Normal  Tongue: Normal  Neck ROM: Normal ROM    Dental:  Intact    Chest/Lungs:  Clear to auscultation    Heart:  Rate: Normal  Rhythm: Regular Rhythm  Sounds: Normal        Anesthesia Plan  Type of Anesthesia, risks & benefits discussed:    Anesthesia Type: Gen ETT  Intra-op Monitoring Plan: Standard ASA Monitors  Post Op Pain Control Plan: multimodal analgesia  Induction:  IV  Airway Plan: Video and Direct  Informed Consent: Informed consent signed with the Patient and all parties understand the risks and agree with anesthesia plan.  All questions answered.   ASA Score: 2  Anesthesia Plan Notes: Ofirmev 1g    Ready For Surgery From Anesthesia Perspective.     .

## 2022-03-07 NOTE — PROGRESS NOTES
[facility] Medicine  Progress Note    Patient Name: Kalen Evans  MRN: [id number]  Patient Class: IP- Inpatient   Admission Date: 3/4/2022  Length of Stay: 3 days  Attending Physician: Kwesi Peña MD  Primary Care Provider: Phuc Pelletier MD        Subjective:     Principal Problem:Hyponatremia        HPI:  No notes on file    Overview/Hospital Course:  Patient was seen and examined along with Dr. Epps  He is awake alert and oriented.  Discussed in detail about his brain lesions possible metastasis.  He want to continue the treatment    3/6  Slight drowsy   But patient is very intelligent and can answer the questions appropriately  Discussed with possible side effects with memory loss status post radiation and he is aware of that    3/7  Saw prior to OR   No CP or SOB   WBC increasing . From steroid   NA more less the same , chronic         Interval History:     Review of Systems  Objective:     Vital Signs (Most Recent):  Temp: 97.9 °F (36.6 °C) (03/07/22 0736)  Pulse: 70 (03/07/22 0726)  Resp: 17 (03/07/22 0736)  BP: (!) 147/67 (03/07/22 1347)  SpO2: (!) 92 % (03/07/22 0736)   Vital Signs (24h Range):  Temp:  [97.6 °F (36.4 °C)-98 °F (36.7 °C)] 97.9 °F (36.6 °C)  Pulse:  [70-72] 70  Resp:  [17-22] 17  SpO2:  [92 %-96 %] 92 %  BP: (124-147)/(67-80) 147/67     Weight: 81.6 kg (180 lb)  Body mass index is 26.58 kg/m².    Intake/Output Summary (Last 24 hours) at 3/7/2022 1358  Last data filed at 3/7/2022 1353  Gross per 24 hour   Intake 1350 ml   Output 775 ml   Net 575 ml      Physical Exam    Significant Labs: All pertinent labs within the past 24 hours have been reviewed.  CBC:   Recent Labs   Lab 03/06/22 0435 03/07/22  0624   WBC 12.90* 21.76*   HGB 12.3* 11.8*   HCT 36.1* 35.6*    354     CMP:   Recent Labs   Lab 03/06/22 0435 03/07/22  0624   * 126*   K 3.9 4.1   CL 92* 91*   CO2 21* 23   * 154*   BUN 14 21   CREATININE 0.5 0.6   CALCIUM 10.3 10.4   PROT  7.1 7.1   ALBUMIN 2.7* 2.6*   BILITOT 0.8 0.7   ALKPHOS 96 89   AST 35 48*   ALT 16 25   ANIONGAP 13 12   EGFRNONAA >60.0 >60.0     Cardiac Markers: No results for input(s): CKMB, MYOGLOBIN, BNP, TROPISTAT in the last 48 hours.    Significant Imaging: I have reviewed all pertinent imaging results/findings within the past 24 hours.      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 Hyponatremia/weakness  2. Probable new metastases to the brain in the patient in patient with history of large cell carcinoma with neuroendocrine features, stage IV disease with intrapulmonary metastases.      3. Right Humerus fracture (POA)      4.  Right upper extremity ulcer      5.  Essential hypertension      6.  History of coronary artery disease status post CABG  7.  Hyperlipidemia   8.  Frequent falls     PLAN   Patient wants to continued active treatment  Radiation Oncology will arrange OP  for whole brain radiation  Continue to monitor hyponatremia. Fluid restrict   On  decadron   -seizure precautions  For ORIF  today   Typically he is hospice candidate   Recommended by oncologist  to hold keytruda at this time .         VTE Risk Mitigation (From admission, onward)         Ordered     IP VTE LOW RISK PATIENT  Once         03/04/22 1321     Place sequential compression device  Until discontinued         03/04/22 1321                Discharge Planning   MEENA: 3/6/2022     Code Status: Full Code   Is the patient medically ready for discharge?:     Reason for patient still in hospital (select all that apply): Treatment  Discharge Plan A: Other (TBD)                  Kwesi Peña MD  Department of Hospital Medicine   [facility]

## 2022-03-07 NOTE — SUBJECTIVE & OBJECTIVE
Interval History:     Review of Systems  Objective:     Vital Signs (Most Recent):  Temp: 97.9 °F (36.6 °C) (03/07/22 0736)  Pulse: 70 (03/07/22 0726)  Resp: 17 (03/07/22 0736)  BP: (!) 147/67 (03/07/22 1347)  SpO2: (!) 92 % (03/07/22 0736)   Vital Signs (24h Range):  Temp:  [97.6 °F (36.4 °C)-98 °F (36.7 °C)] 97.9 °F (36.6 °C)  Pulse:  [70-72] 70  Resp:  [17-22] 17  SpO2:  [92 %-96 %] 92 %  BP: (124-147)/(67-80) 147/67     Weight: 81.6 kg (180 lb)  Body mass index is 26.58 kg/m².    Intake/Output Summary (Last 24 hours) at 3/7/2022 1358  Last data filed at 3/7/2022 1353  Gross per 24 hour   Intake 1350 ml   Output 775 ml   Net 575 ml      Physical Exam    Significant Labs: All pertinent labs within the past 24 hours have been reviewed.  CBC:   Recent Labs   Lab 03/06/22  0435 03/07/22  0624   WBC 12.90* 21.76*   HGB 12.3* 11.8*   HCT 36.1* 35.6*    354     CMP:   Recent Labs   Lab 03/06/22  0435 03/07/22  0624   * 126*   K 3.9 4.1   CL 92* 91*   CO2 21* 23   * 154*   BUN 14 21   CREATININE 0.5 0.6   CALCIUM 10.3 10.4   PROT 7.1 7.1   ALBUMIN 2.7* 2.6*   BILITOT 0.8 0.7   ALKPHOS 96 89   AST 35 48*   ALT 16 25   ANIONGAP 13 12   EGFRNONAA >60.0 >60.0     Cardiac Markers: No results for input(s): CKMB, MYOGLOBIN, BNP, TROPISTAT in the last 48 hours.    Significant Imaging: I have reviewed all pertinent imaging results/findings within the past 24 hours.

## 2022-03-07 NOTE — TRANSFER OF CARE
"Anesthesia Transfer of Care Note    Patient: Kalen Evans    Procedure(s) Performed: Procedure(s) (LRB):  ORIF, FRACTURE, HUMERUS (Right)    Patient location: PACU    Anesthesia Type: general    Transport from OR: Transported from OR on 2-3 L/min O2 by NC with adequate spontaneous ventilation    Post pain: adequate analgesia    Post assessment: no apparent anesthetic complications    Post vital signs: stable    Level of consciousness: awake and alert    Nausea/Vomiting: no nausea/vomiting    Complications: none    Transfer of care protocol was followed      Last vitals:   Visit Vitals  /77   Pulse 70   Temp 36.6 °C (97.9 °F) (Oral)   Resp 17   Ht 5' 9" (1.753 m)   Wt 81.6 kg (180 lb)   SpO2 (!) 92%   BMI 26.58 kg/m²     "

## 2022-03-07 NOTE — ANESTHESIA PROCEDURE NOTES
Intubation    Date/Time: 3/7/2022 11:22 AM  Performed by: Venkatesh Alanis CRNA  Authorized by: Jozef Cisneros Jr., MD     Intubation:     Induction:  Intravenous    Intubated:  Postinduction    Mask Ventilation:  Easy mask    Attempts:  1    Attempted By:  CRNA    Method of Intubation:  Video laryngoscopy    Blade:  Azul 4    Laryngeal View Grade: Grade I - full view of cords      Difficult Airway Encountered?: No      Complications:  None    Airway Device:  Oral endotracheal tube    Airway Device Size:  7.5    Style/Cuff Inflation:  Cuffed (inflated to minimal occlusive pressure)    Tube secured:  23    Secured at:  The lips    Placement Verified By:  Capnometry    Complicating Factors:  None    Findings Post-Intubation:  BS equal bilateral and atraumatic/condition of teeth unchanged

## 2022-03-07 NOTE — CARE UPDATE
03/06/22 1912   Patient Assessment/Suction   Level of Consciousness (AVPU) alert   Respiratory Effort Unlabored   Expansion/Accessory Muscles/Retractions no retractions;no use of accessory muscles   All Lung Fields Breath Sounds clear   RML Breath Sounds diminished   RLL Breath Sounds diminished   Rhythm/Pattern, Respiratory no shortness of breath reported;pattern regular;unlabored   Cough Type fair;nonproductive   PRE-TX-O2   O2 Device (Oxygen Therapy) room air   SpO2 (!) 93 %   Pulse Oximetry Type Intermittent   $ Pulse Oximetry - Multiple Charge Pulse Oximetry - Multiple   Pulse 70   Resp (!) 22   Aerosol Therapy   $ Aerosol Therapy Charges Aerosol Treatment   Daily Review of Necessity (SVN) completed   Respiratory Treatment Status (SVN) given   Treatment Route (SVN) mask;oxygen   Patient Position (SVN) Shane's   Post Treatment Assessment (SVN) breath sounds unchanged   Signs of Intolerance (SVN) none   Respiratory Interventions   Cough And Deep Breathing done with encouragement   Breathing Techniques/Airway Clearance deep/controlled cough encouraged;diaphragmatic breathing promoted;pursed-lip breathing encouraged   Education   $ Education Bronchodilator;Other (see comment);15 min  (o2, prn tx, deep diaphragmatic breathing exercises)   Respiratory Evaluation   $ Care Plan Tech Time 15 min   $ Eval/Re-eval Charges Re-evaluation   Evaluation For   (care plan)

## 2022-03-07 NOTE — PLAN OF CARE
Problem: Adult Inpatient Plan of Care  Goal: Plan of Care Review  Outcome: Ongoing, Progressing  Goal: Absence of Hospital-Acquired Illness or Injury  Outcome: Ongoing, Progressing  Goal: Optimal Comfort and Wellbeing  Outcome: Ongoing, Progressing  Goal: Readiness for Transition of Care  Outcome: Ongoing, Progressing     Problem: Skin Injury Risk Increased  Goal: Skin Health and Integrity  Outcome: Ongoing, Progressing     Problem: Fall Injury Risk  Goal: Absence of Fall and Fall-Related Injury  Outcome: Ongoing, Progressing     Problem: Impaired Wound Healing  Goal: Optimal Wound Healing  Outcome: Ongoing, Progressing

## 2022-03-08 NOTE — CARE UPDATE
03/07/22 1948   Patient Assessment/Suction   Level of Consciousness (AVPU) alert   Respiratory Effort Unlabored   Expansion/Accessory Muscles/Retractions expansion symmetric;no retractions;no use of accessory muscles   All Lung Fields Breath Sounds clear   Rhythm/Pattern, Respiratory no shortness of breath reported;pattern regular;unlabored   PRE-TX-O2   O2 Device (Oxygen Therapy) room air   SpO2 96 %   Pulse Oximetry Type Intermittent   $ Pulse Oximetry - Multiple Charge Pulse Oximetry - Multiple   Pulse 73   Resp 14   Aerosol Therapy   $ Aerosol Therapy Charges PRN treatment not required   Respiratory Interventions   Cough And Deep Breathing done with encouragement   Breathing Techniques/Airway Clearance deep/controlled cough encouraged;diaphragmatic breathing promoted   Education   $ Education Bronchodilator;Other (see comment);15 min  (prn tx, pox, o2, deep diaphragmatic breathing exercises)   Respiratory Evaluation   $ Care Plan Tech Time 15 min   $ Eval/Re-eval Charges Re-evaluation   Evaluation For   (care plan)

## 2022-03-08 NOTE — ANESTHESIA POSTPROCEDURE EVALUATION
Anesthesia Post Evaluation    Patient: Kalen Evans    Procedure(s) Performed: Procedure(s) (LRB):  ORIF, FRACTURE, HUMERUS (Right)    Final Anesthesia Type: general      Patient location during evaluation: PACU  Patient participation: Yes- Able to Participate  Level of consciousness: awake and alert and oriented  Post-procedure vital signs: reviewed and stable  Pain management: adequate  Airway patency: patent    PONV status at discharge: No PONV  Anesthetic complications: no      Cardiovascular status: blood pressure returned to baseline, hemodynamically stable and stable  Respiratory status: unassisted, spontaneous ventilation and nasal cannula  Hydration status: euvolemic  Follow-up not needed.          Vitals Value Taken Time   /76 03/07/22 1618   Temp 36.6 °C (97.8 °F) 03/07/22 1618   Pulse 71 03/07/22 1618   Resp 18 03/07/22 1618   SpO2 97 % 03/07/22 1618         Event Time   Out of Recovery 03/07/2022 15:20:11         Pain/Sumanth Score: Pain Rating Prior to Med Admin: 6 (3/7/2022  2:33 PM)  Pain Rating Post Med Admin: 0 (3/7/2022  2:58 PM)  Sumanth Score: 9 (3/7/2022  3:00 PM)

## 2022-03-08 NOTE — PROGRESS NOTES
[facility] Medicine  Progress Note    Patient Name: Kalen Evans  MRN: [id number]  Patient Class: IP- Inpatient   Admission Date: 3/4/2022  Length of Stay: 4 days  Attending Physician: Kwesi Peña MD  Primary Care Provider: Phuc Pelletier MD        Subjective:     Principal Problem:Hyponatremia        HPI:  No notes on file    Overview/Hospital Course:  Patient was seen and examined along with Dr. Epps  He is awake alert and oriented.  Discussed in detail about his brain lesions possible metastasis.  He want to continue the treatment    3/6  Slight drowsy   But patient is very intelligent and can answer the questions appropriately  Discussed with possible side effects with memory loss status post radiation and he is aware of that    3/7  Saw prior to OR   No CP or SOB   WBC increasing . From steroid   NA more less the same , chronic     3/8  Seen earlier   No CP or SOB   Na normalized   Will DC if surgeon clear today         Interval History:     Review of Systems  As per subjective   Objective:     Vital Signs (Most Recent):  Temp: 97.6 °F (36.4 °C) (03/08/22 1142)  Pulse: 89 (03/08/22 1142)  Resp: 18 (03/08/22 1142)  BP: (!) 162/88 (03/08/22 1142)  SpO2: 95 % (03/08/22 1142)   Vital Signs (24h Range):  Temp:  [97 °F (36.1 °C)-98.1 °F (36.7 °C)] 97.6 °F (36.4 °C)  Pulse:  [73-89] 89  Resp:  [14-19] 18  SpO2:  [90 %-96 %] 95 %  BP: (139-162)/(70-88) 162/88     Weight: 81.6 kg (180 lb)  Body mass index is 26.58 kg/m².    Intake/Output Summary (Last 24 hours) at 3/8/2022 1659  Last data filed at 3/8/2022 0500  Gross per 24 hour   Intake --   Output 450 ml   Net -450 ml      Physical Exam  Constitutional:       General: He is not in acute distress.     Appearance: He is well-developed.   HENT:      Head: Normocephalic.      Mouth/Throat:      Mouth: Mucous membranes are moist.   Eyes:      Pupils: Pupils are equal, round, and reactive to light.   Cardiovascular:      Rate and Rhythm:  Normal rate and regular rhythm.   Pulmonary:      Effort: Pulmonary effort is normal. No respiratory distress.   Abdominal:      General: There is no distension.      Tenderness: There is no abdominal tenderness.   Musculoskeletal:         General: Normal range of motion.      Cervical back: Normal range of motion and neck supple.   Skin:     General: Skin is warm.      Findings: No rash.   Neurological:      Mental Status: He is alert and oriented to person, place, and time.       Significant Labs: All pertinent labs within the past 24 hours have been reviewed.  BMP:   Recent Labs   Lab 03/08/22  0648   *  137*   *  131*   K 4.1  4.1   CL 96  96   CO2 27  27   BUN 26*  26*   CREATININE 0.7  0.7   CALCIUM 10.4  10.4     CBC:   Recent Labs   Lab 03/07/22  0624 03/08/22  0648   WBC 21.76* 21.69*  21.69*   HGB 11.8* 12.2*  12.2*   HCT 35.6* 35.3*  35.3*    400  400     CMP:   Recent Labs   Lab 03/07/22  0624 03/08/22  0648   * 131*  131*   K 4.1 4.1  4.1   CL 91* 96  96   CO2 23 27  27   * 137*  137*   BUN 21 26*  26*   CREATININE 0.6 0.7  0.7   CALCIUM 10.4 10.4  10.4   PROT 7.1 6.8   ALBUMIN 2.6* 2.6*   BILITOT 0.7 0.7   ALKPHOS 89 93   AST 48* 36   ALT 25 23   ANIONGAP 12 8  8   EGFRNONAA >60.0 >60.0  >60.0       Significant Imaging: I have reviewed all pertinent imaging results/findings within the past 24 hours.      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 Hyponatremia/weakness  2. Probable new metastases to the brain in the patient in patient with history of large cell carcinoma with neuroendocrine features, stage IV disease with intrapulmonary metastases.      3. Right Humerus fracture  (POA)      4.  Right upper extremity ulcer      5.  Essential hypertension      6.  History of coronary artery disease status post CABG  7.  Hyperlipidemia   8.  Frequent falls     PLAN   Possible DC today / tomorrow if ortho clear the patient   Radiation Oncology will arrange OP  for whole brain radiation  Continue to monitor hyponatremia. Fluid restrict   On  decadron and DC with decadron   -seizure precautions  Typically he is hospice candidate   Recommended by oncologist  to hold keytruda at this time .  D/w dr Beltran        VTE Risk Mitigation (From admission, onward)         Ordered     IP VTE LOW RISK PATIENT  Once         03/04/22 1321     Place sequential compression device  Until discontinued         03/04/22 1321                Discharge Planning   MEENA: 3/6/2022     Code Status: Full Code   Is the patient medically ready for discharge?:     Reason for patient still in hospital (select all that apply): Treatment  Discharge Plan A: Other (TBD)                  Kwesi Peña MD  Department of Hospital Medicine   [facility]

## 2022-03-08 NOTE — SUBJECTIVE & OBJECTIVE
Interval History:     Review of Systems  As per subjective   Objective:     Vital Signs (Most Recent):  Temp: 97.6 °F (36.4 °C) (03/08/22 1142)  Pulse: 89 (03/08/22 1142)  Resp: 18 (03/08/22 1142)  BP: (!) 162/88 (03/08/22 1142)  SpO2: 95 % (03/08/22 1142)   Vital Signs (24h Range):  Temp:  [97 °F (36.1 °C)-98.1 °F (36.7 °C)] 97.6 °F (36.4 °C)  Pulse:  [73-89] 89  Resp:  [14-19] 18  SpO2:  [90 %-96 %] 95 %  BP: (139-162)/(70-88) 162/88     Weight: 81.6 kg (180 lb)  Body mass index is 26.58 kg/m².    Intake/Output Summary (Last 24 hours) at 3/8/2022 1659  Last data filed at 3/8/2022 0500  Gross per 24 hour   Intake --   Output 450 ml   Net -450 ml      Physical Exam  Constitutional:       General: He is not in acute distress.     Appearance: He is well-developed.   HENT:      Head: Normocephalic.      Mouth/Throat:      Mouth: Mucous membranes are moist.   Eyes:      Pupils: Pupils are equal, round, and reactive to light.   Cardiovascular:      Rate and Rhythm: Normal rate and regular rhythm.   Pulmonary:      Effort: Pulmonary effort is normal. No respiratory distress.   Abdominal:      General: There is no distension.      Tenderness: There is no abdominal tenderness.   Musculoskeletal:         General: Normal range of motion.      Cervical back: Normal range of motion and neck supple.   Skin:     General: Skin is warm.      Findings: No rash.   Neurological:      Mental Status: He is alert and oriented to person, place, and time.       Significant Labs: All pertinent labs within the past 24 hours have been reviewed.  BMP:   Recent Labs   Lab 03/08/22  0648   *  137*   *  131*   K 4.1  4.1   CL 96  96   CO2 27  27   BUN 26*  26*   CREATININE 0.7  0.7   CALCIUM 10.4  10.4     CBC:   Recent Labs   Lab 03/07/22  0624 03/08/22  0648   WBC 21.76* 21.69*  21.69*   HGB 11.8* 12.2*  12.2*   HCT 35.6* 35.3*  35.3*    400  400     CMP:   Recent Labs   Lab 03/07/22  0624 03/08/22  0648   NA  126* 131*  131*   K 4.1 4.1  4.1   CL 91* 96  96   CO2 23 27  27   * 137*  137*   BUN 21 26*  26*   CREATININE 0.6 0.7  0.7   CALCIUM 10.4 10.4  10.4   PROT 7.1 6.8   ALBUMIN 2.6* 2.6*   BILITOT 0.7 0.7   ALKPHOS 89 93   AST 48* 36   ALT 25 23   ANIONGAP 12 8  8   EGFRNONAA >60.0 >60.0  >60.0       Significant Imaging: I have reviewed all pertinent imaging results/findings within the past 24 hours.

## 2022-03-09 NOTE — SUBJECTIVE & OBJECTIVE
Interval History:     Review of Systems  As per subjective   Objective:     Vital Signs (Most Recent):  Temp: 97.9 °F (36.6 °C) (03/09/22 1100)  Pulse: 85 (03/09/22 1100)  Resp: 19 (03/09/22 1100)  BP: (!) 144/73 (03/09/22 1100)  SpO2: (!) 94 % (03/09/22 1100)   Vital Signs (24h Range):  Temp:  [97.3 °F (36.3 °C)-98.1 °F (36.7 °C)] 97.9 °F (36.6 °C)  Pulse:  [83-99] 85  Resp:  [17-20] 19  SpO2:  [92 %-98 %] 94 %  BP: (144-179)/(73-99) 144/73     Weight: 81.6 kg (180 lb)  Body mass index is 26.58 kg/m².    Intake/Output Summary (Last 24 hours) at 3/9/2022 1543  Last data filed at 3/9/2022 1200  Gross per 24 hour   Intake 720 ml   Output 800 ml   Net -80 ml      Physical Exam  Constitutional:       General: He is not in acute distress.     Appearance: He is well-developed.   HENT:      Head: Normocephalic.      Mouth/Throat:      Mouth: Mucous membranes are moist.   Eyes:      Pupils: Pupils are equal, round, and reactive to light.   Cardiovascular:      Rate and Rhythm: Normal rate and regular rhythm.      Pulses: Normal pulses.   Pulmonary:      Effort: No respiratory distress.   Abdominal:      General: Abdomen is flat. There is no distension.      Tenderness: There is no abdominal tenderness.   Musculoskeletal:         General: Normal range of motion.      Cervical back: Normal range of motion and neck supple.   Skin:     Findings: No rash.   Neurological:      Mental Status: He is alert and oriented to person, place, and time.       Significant Labs: All pertinent labs within the past 24 hours have been reviewed.  CBC:   Recent Labs   Lab 03/08/22  0648 03/09/22  0604   WBC 21.69*  21.69* 23.76*   HGB 12.2*  12.2* 12.3*   HCT 35.3*  35.3* 36.4*     400 395     CMP:   Recent Labs   Lab 03/08/22  0648 03/09/22  0604   *  131* 131*   K 4.1  4.1 3.9   CL 96  96 93*   CO2 27  27 26   *  137* 167*   BUN 26*  26* 28*   CREATININE 0.7  0.7 0.6   CALCIUM 10.4  10.4 10.1   PROT 6.8 6.9    ALBUMIN 2.6* 2.8*   BILITOT 0.7 0.7   ALKPHOS 93 102   AST 36 42*   ALT 23 24   ANIONGAP 8  8 12   EGFRNONAA >60.0  >60.0 >60.0     Cardiac Markers: No results for input(s): CKMB, MYOGLOBIN, BNP, TROPISTAT in the last 48 hours.    Significant Imaging: I have reviewed all pertinent imaging results/findings within the past 24 hours.

## 2022-03-09 NOTE — PROGRESS NOTES
[facility] Medicine  Progress Note    Patient Name: Kalen Evans  MRN: [id number]  Patient Class: IP- Inpatient   Admission Date: 3/4/2022  Length of Stay: 5 days  Attending Physician: Kwesi Peña MD  Primary Care Provider: Phuc Pelletier MD        Subjective:     Principal Problem:Hyponatremia        HPI:  No notes on file    Overview/Hospital Course:  Patient was seen and examined along with Dr. Epps  He is awake alert and oriented.  Discussed in detail about his brain lesions possible metastasis.  He want to continue the treatment    3/6  Slight drowsy   But patient is very intelligent and can answer the questions appropriately  Discussed with possible side effects with memory loss status post radiation and he is aware of that    3/7  Saw prior to OR   No CP or SOB   WBC increasing . From steroid   NA more less the same , chronic     3/8  Seen earlier   No CP or SOB   Na normalized   Will DC if surgeon clear today     3/9  Pt is much more drowsier than before   Wife bedside   Plan d/w pt and wife . Patient is confused   Wife made decision to speak with hospice care and consulted       Interval History:     Review of Systems  As per subjective   Objective:     Vital Signs (Most Recent):  Temp: 97.9 °F (36.6 °C) (03/09/22 1100)  Pulse: 85 (03/09/22 1100)  Resp: 19 (03/09/22 1100)  BP: (!) 144/73 (03/09/22 1100)  SpO2: (!) 94 % (03/09/22 1100)   Vital Signs (24h Range):  Temp:  [97.3 °F (36.3 °C)-98.1 °F (36.7 °C)] 97.9 °F (36.6 °C)  Pulse:  [83-99] 85  Resp:  [17-20] 19  SpO2:  [92 %-98 %] 94 %  BP: (144-179)/(73-99) 144/73     Weight: 81.6 kg (180 lb)  Body mass index is 26.58 kg/m².    Intake/Output Summary (Last 24 hours) at 3/9/2022 1543  Last data filed at 3/9/2022 1200  Gross per 24 hour   Intake 720 ml   Output 800 ml   Net -80 ml      Physical Exam  Constitutional:       General: He is not in acute distress.     Appearance: He is well-developed.   HENT:      Head:  Normocephalic.      Mouth/Throat:      Mouth: Mucous membranes are moist.   Eyes:      Pupils: Pupils are equal, round, and reactive to light.   Cardiovascular:      Rate and Rhythm: Normal rate and regular rhythm.      Pulses: Normal pulses.   Pulmonary:      Effort: No respiratory distress.   Abdominal:      General: Abdomen is flat. There is no distension.      Tenderness: There is no abdominal tenderness.   Musculoskeletal:         General: Normal range of motion.      Cervical back: Normal range of motion and neck supple.   Skin:     Findings: No rash.   Neurological:      Mental Status: He is alert and oriented to person, place, and time.       Significant Labs: All pertinent labs within the past 24 hours have been reviewed.  CBC:   Recent Labs   Lab 03/08/22  0648 03/09/22  0604   WBC 21.69*  21.69* 23.76*   HGB 12.2*  12.2* 12.3*   HCT 35.3*  35.3* 36.4*     400 395     CMP:   Recent Labs   Lab 03/08/22  0648 03/09/22  0604   *  131* 131*   K 4.1  4.1 3.9   CL 96  96 93*   CO2 27  27 26   *  137* 167*   BUN 26*  26* 28*   CREATININE 0.7  0.7 0.6   CALCIUM 10.4  10.4 10.1   PROT 6.8 6.9   ALBUMIN 2.6* 2.8*   BILITOT 0.7 0.7   ALKPHOS 93 102   AST 36 42*   ALT 23 24   ANIONGAP 8  8 12   EGFRNONAA >60.0  >60.0 >60.0     Cardiac Markers: No results for input(s): CKMB, MYOGLOBIN, BNP, TROPISTAT in the last 48 hours.    Significant Imaging: I have reviewed all pertinent imaging results/findings within the past 24 hours.      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  Patient is confused and not able to make a  decision.  Radiation Oncology will arrange OP  for whole brain radiation if patient not going hospice care .  PT consulted   Continue to monitor hyponatremia.   On  decadron and DC with decadron   -seizure precautions  Typically he is hospice candidate and wife want to speak with hospice   Recommended by oncologist  to hold keytruda at this time .  D/w Dr Allison and he cleared for discharge         VTE Risk Mitigation (From admission, onward)         Ordered     IP VTE LOW RISK PATIENT  Once         03/04/22 1321     Place sequential compression device  Until discontinued         03/04/22 1321                Discharge Planning   MEENA:      Code Status: Full Code   Is the patient medically ready for discharge?:     Reason for patient still in hospital (select all that apply): Treatment  Discharge Plan A: Hospice/home                  Kwesi Peña MD  Department of Hospital Medicine   [facility]

## 2022-03-09 NOTE — PROGRESS NOTES
"[facility]  Adult Nutrition   Progress Note (Initial Assessment)     SUMMARY     Recommendations/Interventions:    Recommendation/Intervention:  1. Continue Regular diet as tolerated with 1200ml FR per MD.   2. Recommend Unjury BID for added protein.   3. Menu  to obtain meal choices daily.    Goals:   1. Patient to consume >75% meals and supplements to meet estimated energy needs for healing.   2. Weight remain stable.    Nutrition Goal Status: new    Communication of RD Recs: reviewed with RN    Dietitian Rounds Brief:    Patient up in bed at visit, states he he ate 75% of his breakfast and his appetite is improving. Offered supplement and patient was agreeable to try chocolate Unjury for added protein. Patient with history of lung cancer with mets; new brain lesions noted. Patient does not appear malnourished. Patient weight change 45 in 3 months, if weights are correct. No significant.    Reason for Assessment  Reason For Assessment: length of stay  Diagnosis: trauma (s/p fall with right femur fracture)  Relevant Medical History: metastatic lung cancer, HTN, HLD, MI  Interdisciplinary Rounds: attended    Nutrition Risk Screen  Nutrition Risk Screen: no indicators present     MST Score: 1  Have you recently lost weight without trying?: No  Weight loss score: 0  Have you been eating poorly because of a decreased appetite?: Yes  Appetite score: 1       Nutrition/Diet History  Patient Reported Diet/Restrictions/Preferences: general  Food Allergies: NKFA  Factors Affecting Nutritional Intake: decreased appetite, other (see comments) (Lung cancer with mets possible to brain)    Anthropometrics  Temp: 97.9 °F (36.6 °C)  Height Method: Stated  Height: 5' 9" (175.3 cm)  Height (inches): 69 in  Weight Method: Bed Scale  Weight: 81.6 kg (180 lb)  Weight (lb): 180 lb  Ideal Body Weight (IBW), Male: 160 lb  % Ideal Body Weight, Male (lb): 112.5 %  BMI (Calculated): 26.6  BMI Grade: 25 - 29.9 - " overweight       Weight History:  Wt Readings from Last 10 Encounters:   03/04/22 81.6 kg (180 lb)   03/03/22 83.9 kg (185 lb)   03/02/22 81.2 kg (179 lb)   03/02/22 81.5 kg (179 lb 9.6 oz)   02/09/22 84.4 kg (186 lb)   02/08/22 84.4 kg (186 lb)   02/07/22 85.3 kg (188 lb)   01/27/22 84.4 kg (186 lb)   01/04/22 84.4 kg (186 lb)   01/03/22 85.3 kg (188 lb)   }  Lab/Procedures/Meds: Pertinent Labs Reviewed  Clinical Chemistry:  Recent Labs   Lab 03/09/22  0604   *   K 3.9   CL 93*   CO2 26   *   BUN 28*   CREATININE 0.6   CALCIUM 10.1   PROT 6.9   ALBUMIN 2.8*   BILITOT 0.7   ALKPHOS 102   AST 42*   ALT 24   ANIONGAP 12   ESTGFRAFRICA >60.0   EGFRNONAA >60.0     CBC:   Recent Labs   Lab 03/09/22  0604   WBC 23.76*   RBC 4.22*   HGB 12.3*   HCT 36.4*      MCV 86   MCH 29.1   MCHC 33.8     Lipid Panel:  No results for input(s): CHOL, HDL, LDLCALC, TRIG, CHOLHDL in the last 168 hours.  Cardiac Profile:  Recent Labs   Lab 03/04/22  0850 03/04/22  1543 03/04/22  1916   TROPONINI 0.034 0.032 0.036     Inflammatory Labs:  No results for input(s): CRP in the last 168 hours.  Diabetes:  No results for input(s): HGBA1C, POCTGLUCOSE in the last 168 hours.  Thyroid & Parathyroid:  No results for input(s): TSH, FREET4, U8NXFEW, V1PDBWG, THYROIDAB in the last 168 hours.    Medications: Pertinent Medications reviewed  Scheduled Meds:   [START ON 3/10/2022] amLODIPine  10 mg Oral Daily    And    [START ON 3/10/2022] benazepriL  40 mg Oral Daily    aspirin  81 mg Oral Daily    dexamethasone  6 mg Intravenous Q4H    doxycycline  100 mg Oral Q12H    levETIRAcetam in NaCl (iso-os)  500 mg Intravenous Q12H    metoprolol succinate  100 mg Oral Daily    multivitamin  1 tablet Oral Daily    pravastatin  20 mg Oral QHS    senna-docusate 8.6-50 mg  1 tablet Oral BID     Continuous Infusions:   loperamide       PRN Meds:.acetaminophen, albuterol-ipratropium, benzonatate, HYDROcodone-acetaminophen, HYDROmorphone,  loperamide, melatonin, ondansetron, promethazine, sodium chloride 0.9%, sodium chloride 0.9%    Antibiotics (From admission, onward)            Start     Stop Route Frequency Ordered    03/04/22 2100  doxycycline capsule 100 mg         -- Oral Every 12 hours 03/04/22 1650           Estimated/Assessed Needs  Weight Used For Calorie Calculations: 81.6 kg (179 lb 14.3 oz)  Energy Calorie Requirements (kcal): 4581-1563 kcal/day (25-30 kcal/kg)  Energy Need Method: Kcal/kg  Protein Requirements: 1.5-2.0 gm/kg (122-163 gm/day)  Weight Used For Protein Calculations: 81.6 kg (179 lb 14.3 oz)  Fluid Requirements (mL): 2040 ml/day (25ml/kg     RDA Method (mL): 2040       Nutrition Prescription Ordered    Current Diet Order: Regular diet  Oral Nutrition Supplement: Unjury BID    Evaluation of Received Nutrient/Fluid Intake    Energy Calories Required: meeting needs  Protein Required: meeting needs  Fluid Required: other (see comments) (per MD)  Total Fluid Intake (mL/kg): 1200 ml (fluid restriction per MD)  % Intake of Estimated Energy Needs: 50 - 75 %  % Meal Intake: 50 - 75 %    Intake/Output Summary (Last 24 hours) at 3/9/2022 1339  Last data filed at 3/9/2022 1200  Gross per 24 hour   Intake 720 ml   Output 800 ml   Net -80 ml      Nutrition Risk  Moderate-High    Monitor and Evaluation    Food and Nutrient Intake: energy intake, food and beverage intake  Food and Nutrient Adminstration: diet order  Knowledge/Beliefs/Attitudes: food and nutrition knowledge/skill  Physical Activity and Function: nutrition-related ADLs and IADLs  Anthropometric Measurements: weight, weight change, body mass index  Biochemical Data, Medical Tests and Procedures: electrolyte and renal panel, gastrointestinal profile, glucose/endocrine profile, inflammatory profile, lipid profile  Nutrition-Focused Physical Findings: overall appearance     Nutrition Follow-Up    RD Follow-up?: Yes    Julianna Kuo RD, LDN 03/09/2022 1:39 PM

## 2022-03-09 NOTE — CARE UPDATE
03/09/22 0923   Patient Assessment/Suction   Level of Consciousness (AVPU) alert   Respiratory Effort Normal;Unlabored   Expansion/Accessory Muscles/Retractions no use of accessory muscles   All Lung Fields Breath Sounds diminished;equal bilaterally   Rhythm/Pattern, Respiratory unlabored;pattern regular   PRE-TX-O2   O2 Device (Oxygen Therapy) nasal cannula   $ Is the patient on Low Flow Oxygen? Yes   Flow (L/min) 3   SpO2 (!) 94 %   Pulse Oximetry Type Intermittent   $ Pulse Oximetry - Multiple Charge Pulse Oximetry - Multiple   Pulse 85   Resp 20   Aerosol Therapy   $ Aerosol Therapy Charges PRN treatment not required   Education   $ Education 15 min  (02, sats)   Respiratory Evaluation   $ Care Plan Tech Time 15 min   $ Eval/Re-eval Charges Re-evaluation

## 2022-03-09 NOTE — PLAN OF CARE
Dr Peña requested hospice consult during 1 pm meeting with DONALD.  spoke with spouse, Noemy. Caregiver states waiting to see hospice and receive information about home hospice vs hospice house or inpatient hospice.  sent referral to [facility] and notified Laura and Santi at hospice of referral.       03/09/22 1511   Discharge Reassessment   Assessment Type Discharge Planning Reassessment   Did the patient's condition or plan change since previous assessment? Yes   Discharge Plan discussed with: Spouse/sig other;Patient   Name(s) and Number(s) Noemy   Discharge Plan A Hospice/home   Discharge Plan B Inpatient Hospice   Why the patient remains in the hospital Requires continued medical care   Post-Acute Status   Post-Acute Authorization Hospice   Hospice Status Referrals Sent

## 2022-03-09 NOTE — PLAN OF CARE
Checked in with patient to do a reassessment. Patient was tired but seemed in good spirits. No changes to current discharge plan

## 2022-03-10 NOTE — PROGRESS NOTES
Pharmacist Intervention IV to PO Note    Kalen Evans is a 76 y.o. male being treated with IV medication Levetiracetam    Patient Data:    Vital Signs (Most Recent):  Temp: 97.9 °F (36.6 °C) (03/10/22 0707)  Pulse: 76 (03/10/22 0707)  Resp: 18 (03/10/22 0707)  BP: (!) 158/82 (03/10/22 0707)  SpO2: 95 % (03/10/22 0707)   Vital Signs (72h Range):  Temp:  [97 °F (36.1 °C)-98.5 °F (36.9 °C)]   Pulse:  [71-99]   Resp:  [14-29]   BP: (121-179)/(58-99)   SpO2:  [90 %-98 %]      CBC:  Recent Labs   Lab 03/08/22  0648 03/09/22  0604 03/10/22  0457   WBC 21.69*  21.69* 23.76* 27.96*   RBC 4.15*  4.15* 4.22* 4.22*   HGB 12.2*  12.2* 12.3* 12.5*   HCT 35.3*  35.3* 36.4* 37.4*     400 395 367   MCV 85  85 86 89   MCH 29.4  29.4 29.1 29.6   MCHC 34.6  34.6 33.8 33.4     CMP:     Recent Labs   Lab 03/08/22  0648 03/09/22  0604 03/10/22  0456   *  137* 167* 172*   CALCIUM 10.4  10.4 10.1 9.7   ALBUMIN 2.6* 2.8* 2.7*   PROT 6.8 6.9 6.8   *  131* 131* 126*   K 4.1  4.1 3.9 4.3   CO2 27  27 26 25   CL 96  96 93* 91*   BUN 26*  26* 28* 30*   CREATININE 0.7  0.7 0.6 0.6   ALKPHOS 93 102 100   ALT 23 24 23   AST 36 42* 34   BILITOT 0.7 0.7 0.7       Dietary Orders:  Diet Orders  Report           Dietary nutrition supplements [facility]; Unjury starting at 03/09 1800    Diet Adult Regular (IDDSI Level 7): Regular starting at 03/07 1528            Based on the following criteria, this patient qualifies for intravenous to oral conversion:  [x] The patients gastrointestinal tract is functioning (tolerating medications via oral or enteral route for 24 hours and tolerating food or enteral feeds for 24 hours).    Levetiracetam 500 mg IV q 12 hrs will be changed to Levetiracetam 500 mg PO q 12 hrs    Pharmacist's Name: Cyril Llanes  Pharmacist's Extension: [phone]

## 2022-03-10 NOTE — CARE UPDATE
03/09/22 2004   PRE-TX-O2   O2 Device (Oxygen Therapy) nasal cannula   $ Is the patient on Low Flow Oxygen? Yes   Flow (L/min) 3   SpO2 (!) 93 %   Pulse Oximetry Type Intermittent   $ Pulse Oximetry - Multiple Charge Pulse Oximetry - Multiple   Pulse 83   Resp 19   Education   $ Education 15 min   Respiratory Evaluation   $ Care Plan Tech Time 15 min

## 2022-03-10 NOTE — SUBJECTIVE & OBJECTIVE
Interval History:     Review of Systems  Objective:     Vital Signs (Most Recent):  Temp: 97.8 °F (36.6 °C) (03/10/22 1100)  Pulse: 83 (03/10/22 1210)  Resp: 18 (03/10/22 1210)  BP: (!) 156/78 (03/10/22 1100)  SpO2: 95 % (03/10/22 1210)   Vital Signs (24h Range):  Temp:  [97.8 °F (36.6 °C)-98.5 °F (36.9 °C)] 97.8 °F (36.6 °C)  Pulse:  [76-90] 83  Resp:  [16-20] 18  SpO2:  [93 %-95 %] 95 %  BP: (147-168)/(76-99) 156/78     Weight: 81.6 kg (180 lb)  Body mass index is 26.58 kg/m².    Intake/Output Summary (Last 24 hours) at 3/10/2022 1359  Last data filed at 3/10/2022 1100  Gross per 24 hour   Intake 1240 ml   Output 1050 ml   Net 190 ml      Physical Exam  Constitutional:       General: He is not in acute distress.     Appearance: He is well-developed.   HENT:      Head: Normocephalic.   Eyes:      Pupils: Pupils are equal, round, and reactive to light.   Cardiovascular:      Rate and Rhythm: Normal rate and regular rhythm.   Pulmonary:      Effort: Pulmonary effort is normal. No respiratory distress.   Abdominal:      General: Abdomen is flat. There is no distension.      Tenderness: There is no abdominal tenderness.   Musculoskeletal:         General: Normal range of motion.      Cervical back: Neck supple.   Skin:     General: Skin is warm.      Findings: No rash.   Neurological:      Mental Status: He is alert. He is disoriented.   Psychiatric:         Mood and Affect: Mood normal.       Significant Labs: All pertinent labs within the past 24 hours have been reviewed.  BMP:   Recent Labs   Lab 03/10/22  0456   *   *   K 4.3   CL 91*   CO2 25   BUN 30*   CREATININE 0.6   CALCIUM 9.7     CBC:   Recent Labs   Lab 03/09/22  0604 03/10/22  0457   WBC 23.76* 27.96*   HGB 12.3* 12.5*   HCT 36.4* 37.4*    367     CMP:   Recent Labs   Lab 03/09/22  0604 03/10/22  0456   * 126*   K 3.9 4.3   CL 93* 91*   CO2 26 25   * 172*   BUN 28* 30*   CREATININE 0.6 0.6   CALCIUM 10.1 9.7   PROT 6.9 6.8    ALBUMIN 2.8* 2.7*   BILITOT 0.7 0.7   ALKPHOS 102 100   AST 42* 34   ALT 24 23   ANIONGAP 12 10   EGFRNONAA >60.0 >60.0       Significant Imaging: I have reviewed all pertinent imaging results/findings within the past 24 hours.

## 2022-03-10 NOTE — PROGRESS NOTES
Patient is postop day 2 inspected his wound is no drainage or cellulitis the patient appears to be comfortable in bed his arm is at his side in a sling and swath.  No further recommendations surgically at this time recommend keeping the arm in a sling and swath consulting PT for possible sitting up in bed or sitting up in a chair.  Patient can ambulate full weight-bearing as tolerated but will need maximum assistance.

## 2022-03-10 NOTE — PLAN OF CARE
Important Message from Medicare was sign, explained and given to patient/caregiver on 03/10/2022 at 8:22am     addressed any questions or concerns.    Important Message from Medicare document will be scanned into patient's medical record

## 2022-03-10 NOTE — PROGRESS NOTES
[facility] Medicine  Progress Note    Patient Name: Kalen Evans  MRN: [id number]  Patient Class: IP- Inpatient   Admission Date: 3/4/2022  Length of Stay: 6 days  Attending Physician: Kwesi Peña MD  Primary Care Provider: Phuc Pelletier MD        Subjective:     Principal Problem:Hyponatremia        HPI:  No notes on file    Overview/Hospital Course:  Patient was seen and examined along with Dr. Epps  He is awake alert and oriented.  Discussed in detail about his brain lesions possible metastasis.  He want to continue the treatment    3/6  Slight drowsy   But patient is very intelligent and can answer the questions appropriately  Discussed with possible side effects with memory loss status post radiation and he is aware of that    3/7  Saw prior to OR   No CP or SOB   WBC increasing . From steroid   NA more less the same , chronic     3/8  Seen earlier   No CP or SOB   Na normalized   Will DC if surgeon clear today     3/9  Pt is much more drowsier than before   Wife bedside   Plan d/w pt and wife . Patient is confused   Wife made decision to speak with hospice care and consulted     3/10  Drowsy   Response appropriately   Hospice placement in progress.        Interval History:     Review of Systems  Objective:     Vital Signs (Most Recent):  Temp: 97.8 °F (36.6 °C) (03/10/22 1100)  Pulse: 83 (03/10/22 1210)  Resp: 18 (03/10/22 1210)  BP: (!) 156/78 (03/10/22 1100)  SpO2: 95 % (03/10/22 1210)   Vital Signs (24h Range):  Temp:  [97.8 °F (36.6 °C)-98.5 °F (36.9 °C)] 97.8 °F (36.6 °C)  Pulse:  [76-90] 83  Resp:  [16-20] 18  SpO2:  [93 %-95 %] 95 %  BP: (147-168)/(76-99) 156/78     Weight: 81.6 kg (180 lb)  Body mass index is 26.58 kg/m².    Intake/Output Summary (Last 24 hours) at 3/10/2022 1359  Last data filed at 3/10/2022 1100  Gross per 24 hour   Intake 1240 ml   Output 1050 ml   Net 190 ml      Physical Exam  Constitutional:       General: He is not in acute distress.      Appearance: He is well-developed.   HENT:      Head: Normocephalic.   Eyes:      Pupils: Pupils are equal, round, and reactive to light.   Cardiovascular:      Rate and Rhythm: Normal rate and regular rhythm.   Pulmonary:      Effort: Pulmonary effort is normal. No respiratory distress.   Abdominal:      General: Abdomen is flat. There is no distension.      Tenderness: There is no abdominal tenderness.   Musculoskeletal:         General: Normal range of motion.      Cervical back: Neck supple.   Skin:     General: Skin is warm.      Findings: No rash.   Neurological:      Mental Status: He is alert. He is disoriented.   Psychiatric:         Mood and Affect: Mood normal.       Significant Labs: All pertinent labs within the past 24 hours have been reviewed.  BMP:   Recent Labs   Lab 03/10/22  0456   *   *   K 4.3   CL 91*   CO2 25   BUN 30*   CREATININE 0.6   CALCIUM 9.7     CBC:   Recent Labs   Lab 03/09/22  0604 03/10/22  0457   WBC 23.76* 27.96*   HGB 12.3* 12.5*   HCT 36.4* 37.4*    367     CMP:   Recent Labs   Lab 03/09/22  0604 03/10/22  0456   * 126*   K 3.9 4.3   CL 93* 91*   CO2 26 25   * 172*   BUN 28* 30*   CREATININE 0.6 0.6   CALCIUM 10.1 9.7   PROT 6.9 6.8   ALBUMIN 2.8* 2.7*   BILITOT 0.7 0.7   ALKPHOS 102 100   AST 42* 34   ALT 24 23   ANIONGAP 12 10   EGFRNONAA >60.0 >60.0       Significant Imaging: I have reviewed all pertinent imaging results/findings within the past 24 hours.      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 Patient need placement too   OP  for whole brain radiation if patient not going hospice care .  PT consulted   Continue to monitor hyponatremia.     On  decadron and DC with decadron   -seizure precautions   Recommended by oncologist  to hold keytruda at this time .  D/w Dr Allison and he cleared for discharge        VTE Risk Mitigation (From admission, onward)         Ordered     IP VTE LOW RISK PATIENT  Once         03/04/22 1321     Place sequential compression device  Until discontinued         03/04/22 1321                Discharge Planning   MEENA: 3/10/2022     Code Status: Full Code   Is the patient medically ready for discharge?:     Reason for patient still in hospital (select all that apply): Treatment  Discharge Plan A: Hospice/home                  Kwesi Peña MD  Department of Hospital Medicine   [facility]

## 2022-03-10 NOTE — CARE UPDATE
03/10/22 1210   Patient Assessment/Suction   Level of Consciousness (AVPU) alert   Respiratory Effort Normal;Unlabored   Expansion/Accessory Muscles/Retractions no use of accessory muscles   All Lung Fields Breath Sounds diminished   Rhythm/Pattern, Respiratory unlabored;pattern regular   PRE-TX-O2   O2 Device (Oxygen Therapy) nasal cannula   $ Is the patient on Low Flow Oxygen? Yes   Flow (L/min) 3   SpO2 95 %   Pulse Oximetry Type Intermittent   $ Pulse Oximetry - Multiple Charge Pulse Oximetry - Multiple   Pulse 83   Resp 18   Aerosol Therapy   $ Aerosol Therapy Charges PRN treatment not required   Education   $ Education 15 min  (sats o2)   Respiratory Evaluation   $ Care Plan Tech Time 15 min   $ Eval/Re-eval Charges Re-evaluation

## 2022-03-11 NOTE — PT/OT/SLP EVAL
"Physical Therapy Evaluation    Patient Name:  Kalen Evans   MRN:  [id number]    Recommendations:     Discharge Recommendations:   (Family is considering Inpatient  Hospice care)   Discharge Equipment Recommendations:  (TBD)   Barriers to discharge: Decreased caregiver support    Assessment:     Kalen Evans is a 76 y.o. male admitted with a medical diagnosis of Hyponatremia.  He presents with the following impairments/functional limitations:  weakness, impaired endurance, impaired functional mobilty, gait instability, impaired balance, decreased lower extremity function, decreased upper extremity function, decreased safety awareness, decreased ROM .  PT order for t/f training bed to chair. Pt has recent history of falls.  He no longer receives chemo as his wife explained " the doctors said  there is nothing else they can do for him."Wife  is having difficulty caring for pt due to progressive weakness and is considering Hospice care.    Rehab Prognosis: guarded; patient would benefit from acute skilled PT services to address these deficits and reach maximum level of function.    Recent Surgery: Procedure(s) (LRB):  ORIF, FRACTURE, HUMERUS (Right) 4 Days Post-Op    Plan:     During this hospitalization, patient to be seen 6 x/week to address the identified rehab impairments via therapeutic activities and progress toward the following goals:    · Plan of Care Expires:  04/11/22    Subjective     Chief Complaint: [place]  Patient/Family Comments/goals:TBD  Pain/Comfort:  · Pain Rating 1:  (Did not quantify)  · Location - Side 1: Right  · Location - Orientation 1: upper  · Location 1: shoulder  · Pain Addressed 1: Pre-medicate for activity, Reposition    Patients cultural, spiritual, Christian conflicts given the current situation:      Living Environment:  Pt  lives  With his wife in a 2 story house with 1st floor accommodating  pt's needs.  Prior to admission, patient "required help with everything."  Equipment used " at home: none.  DME owned (not currently used): none.  Upon discharge, patient will have assistance from wife/facility. Considering inpatient Hospice Care.    Objective:     Communicated with nurse prior to session.  Patient found supine with    upon PT entry to room.    General Precautions: Standard, fall   Orthopedic Precautions:    Braces:    Respiratory Status: Nasal cannula, flow 2  L/min    Exams:  · RLE ROM: WFL  · RLE Strength: 3+/5  · LLE ROM: WFL  · LLE Strength: 3+/5    Functional Mobility:  · Bed Mobility:     · Supine to Sit: maximal assistance and of 2 persons  · Transfers:     · Bed to Chair: total assistance and of 2 persons with  hand-held assist  using  Squat Pivot    Therapeutic Activities and Exercises:   Pt and his wife was educated on the role of PT  for assessment, treatment with emphasis on safety and increased mobility and D/C  Recommendations. Pt was left in chair with R UE on pillow for comfort. Wife was present.    AM-PAC 6 CLICK MOBILITY  Total Score:8     Patient left up in chair with all lines intact, call button in reach and his wife present.    GOALS:   Multidisciplinary Problems     Physical Therapy Goals        Problem: Physical Therapy Goal    Goal Priority Disciplines Outcome Goal Variances Interventions   Physical Therapy Goal     PT, PT/OT      Description: Goals to be met by: D/C    Patient will increase functional independence with mobility by performin. Supine to sit with Maximum Assistance  2. Bed to chair transfer with Maximum Assistance using No Assistive Device                     History:     Past Medical History:   Diagnosis Date    Hyperlipidemia     Hypertension     Myocardial infarction        Past Surgical History:   Procedure Laterality Date    BACK SURGERY N/A     CARDIAC SURGERY      FRACTURE SURGERY      right wrist    HERNIA REPAIR      ORIF HUMERUS FRACTURE Right 3/7/2022    Procedure: ORIF, FRACTURE, HUMERUS;  Surgeon: Da Bass,  MD;  Location: [facility];  Service: Orthopedics;  Laterality: Right;    ROTATOR CUFF REPAIR         Time Tracking:     PT Received On: 03/11/22  PT Start Time: 1118     PT Stop Time: 1140  PT Total Time (min): 22 min     Billable Minutes: Evaluation 11 minutes and Therapeutic Activity 11 minutes      03/11/2022

## 2022-03-11 NOTE — CARE UPDATE
03/10/22 2026   Patient Assessment/Suction   Level of Consciousness (AVPU) alert   Respiratory Effort Unlabored   Expansion/Accessory Muscles/Retractions no use of accessory muscles   All Lung Fields Breath Sounds clear;diminished   Rhythm/Pattern, Respiratory no shortness of breath reported   Cough Frequency no cough   PRE-TX-O2   O2 Device (Oxygen Therapy) nasal cannula   $ Is the patient on Low Flow Oxygen? Yes   Flow (L/min) 3   SpO2 (!) 3 %   Pulse Oximetry Type Intermittent   $ Pulse Oximetry - Multiple Charge Pulse Oximetry - Multiple   Pulse 66   Resp 20   Positioning   Head of Bed (HOB) Positioning HOB elevated;HOB at 30 degrees   Respiratory Evaluation   $ Care Plan Tech Time 15 min

## 2022-03-11 NOTE — SUBJECTIVE & OBJECTIVE
Interval History:     Review of Systems  Objective:     Vital Signs (Most Recent):  Temp: 97.6 °F (36.4 °C) (03/11/22 1105)  Pulse: 77 (03/11/22 1105)  Resp: 18 (03/11/22 1105)  BP: (!) 143/74 (03/11/22 1105)  SpO2: (!) 94 % (03/11/22 1105)   Vital Signs (24h Range):  Temp:  [97.6 °F (36.4 °C)-98.6 °F (37 °C)] 97.6 °F (36.4 °C)  Pulse:  [66-89] 77  Resp:  [17-20] 18  SpO2:  [3 %-95 %] 94 %  BP: (143-159)/(74-87) 143/74     Weight: 81.6 kg (180 lb)  Body mass index is 26.58 kg/m².    Intake/Output Summary (Last 24 hours) at 3/11/2022 1336  Last data filed at 3/11/2022 1106  Gross per 24 hour   Intake 480 ml   Output 1300 ml   Net -820 ml      Physical Exam  Constitutional:       General: He is not in acute distress.     Appearance: He is well-developed.   HENT:      Head: Normocephalic.   Eyes:      Pupils: Pupils are equal, round, and reactive to light.   Cardiovascular:      Rate and Rhythm: Normal rate and regular rhythm.   Pulmonary:      Effort: No respiratory distress.   Abdominal:      General: Abdomen is flat. There is no distension.      Tenderness: There is no abdominal tenderness.   Musculoskeletal:      Cervical back: Neck supple.   Skin:     Findings: No rash.   Neurological:      Mental Status: He is alert and oriented to person, place, and time. Mental status is at baseline.       Significant Labs: All pertinent labs within the past 24 hours have been reviewed.  CBC:   Recent Labs   Lab 03/10/22  0457 03/11/22  0453   WBC 27.96* 28.75*   HGB 12.5* 11.9*   HCT 37.4* 35.8*    372     CMP:   Recent Labs   Lab 03/10/22  0456 03/11/22  0453   * 131*   K 4.3 4.2   CL 91* 94*   CO2 25 25   * 190*   BUN 30* 32*   CREATININE 0.6 0.5   CALCIUM 9.7 9.7   PROT 6.8 6.5   ALBUMIN 2.7* 2.7*   BILITOT 0.7 0.8   ALKPHOS 100 99   AST 34 33   ALT 23 27   ANIONGAP 10 12   EGFRNONAA >60.0 >60.0     Cardiac Markers: No results for input(s): CKMB, MYOGLOBIN, BNP, TROPISTAT in the last 48  hours.    Significant Imaging: I have reviewed all pertinent imaging results/findings within the past 24 hours.

## 2022-03-11 NOTE — PROGRESS NOTES
[facility] Medicine  Progress Note    Patient Name: Kalen Evans  MRN: [id number]  Patient Class: IP- Inpatient   Admission Date: 3/4/2022  Length of Stay: 7 days  Attending Physician: Kwesi Peña MD  Primary Care Provider: Phuc Pelletier MD        Subjective:     Principal Problem:Hyponatremia        HPI:  No notes on file    Overview/Hospital Course:  Patient was seen and examined along with Dr. Epps  He is awake alert and oriented.  Discussed in detail about his brain lesions possible metastasis.  He want to continue the treatment    3/6  Slight drowsy   But patient is very intelligent and can answer the questions appropriately  Discussed with possible side effects with memory loss status post radiation and he is aware of that    3/7  Saw prior to OR   No CP or SOB   WBC increasing . From steroid   NA more less the same , chronic     3/8  Seen earlier   No CP or SOB   Na normalized   Will DC if surgeon clear today     3/9  Pt is much more drowsier than before   Wife bedside   Plan d/w pt and wife . Patient is confused   Wife made decision to speak with hospice care and consulted     3/10  Drowsy   Response appropriately   Hospice placement in progress.    3/11  More awake today   No CP or SOB   Still awaited for hospice . No bed space for hospice placement         Interval History:     Review of Systems  Objective:     Vital Signs (Most Recent):  Temp: 97.6 °F (36.4 °C) (03/11/22 1105)  Pulse: 77 (03/11/22 1105)  Resp: 18 (03/11/22 1105)  BP: (!) 143/74 (03/11/22 1105)  SpO2: (!) 94 % (03/11/22 1105)   Vital Signs (24h Range):  Temp:  [97.6 °F (36.4 °C)-98.6 °F (37 °C)] 97.6 °F (36.4 °C)  Pulse:  [66-89] 77  Resp:  [17-20] 18  SpO2:  [3 %-95 %] 94 %  BP: (143-159)/(74-87) 143/74     Weight: 81.6 kg (180 lb)  Body mass index is 26.58 kg/m².    Intake/Output Summary (Last 24 hours) at 3/11/2022 1336  Last data filed at 3/11/2022 1106  Gross per 24 hour   Intake 480 ml   Output 1300  ml   Net -820 ml      Physical Exam  Constitutional:       General: He is not in acute distress.     Appearance: He is well-developed.   HENT:      Head: Normocephalic.   Eyes:      Pupils: Pupils are equal, round, and reactive to light.   Cardiovascular:      Rate and Rhythm: Normal rate and regular rhythm.   Pulmonary:      Effort: No respiratory distress.   Abdominal:      General: Abdomen is flat. There is no distension.      Tenderness: There is no abdominal tenderness.   Musculoskeletal:      Cervical back: Neck supple.   Skin:     Findings: No rash.   Neurological:      Mental Status: He is alert and oriented to person, place, and time. Mental status is at baseline.       Significant Labs: All pertinent labs within the past 24 hours have been reviewed.  CBC:   Recent Labs   Lab 03/10/22  0457 03/11/22  0453   WBC 27.96* 28.75*   HGB 12.5* 11.9*   HCT 37.4* 35.8*    372     CMP:   Recent Labs   Lab 03/10/22  0456 03/11/22  0453   * 131*   K 4.3 4.2   CL 91* 94*   CO2 25 25   * 190*   BUN 30* 32*   CREATININE 0.6 0.5   CALCIUM 9.7 9.7   PROT 6.8 6.5   ALBUMIN 2.7* 2.7*   BILITOT 0.7 0.8   ALKPHOS 100 99   AST 34 33   ALT 23 27   ANIONGAP 10 12   EGFRNONAA >60.0 >60.0     Cardiac Markers: No results for input(s): CKMB, MYOGLOBIN, BNP, TROPISTAT in the last 48 hours.    Significant Imaging: I have reviewed all pertinent imaging results/findings within the past 24 hours.      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 Patient need placement too   OP  for whole brain radiation if patient not going hospice care .  Patient need to sit up   Continue to monitor hyponatremia.    On  decadron and DC with  decadron   -seizure precautions   Recommended by oncologist  to hold keytruda at this time .  D/w Dr Allison and he cleared for discharge   DC when hospice ready           VTE Risk Mitigation (From admission, onward)         Ordered     IP VTE LOW RISK PATIENT  Once         03/04/22 1321     Place sequential compression device  Until discontinued         03/04/22 1321                Discharge Planning   MEENA: 3/10/2022     Code Status: Full Code   Is the patient medically ready for discharge?:     Reason for patient still in hospital (select all that apply): Treatment  Discharge Plan A: Hospice/home                  Kwesi Peña MD  Department of Hospital Medicine   [facility]

## 2022-03-11 NOTE — CARE UPDATE
Patient seeking hospice but also needs placement.  contacted [facility], hospice house is full.  sent referral to [facility] to see if patient is a candidate for their inpatient unit.

## 2022-03-12 NOTE — SUBJECTIVE & OBJECTIVE
Interval History:     Oncology Treatment Plan:   OP PEMBROLIZUMAB 200MG Q3W    Medications:  Continuous Infusions:   loperamide       Scheduled Meds:   amino acids 4.25%-dkxtb-Lp-D6M   Intravenous Daily    [START ON 3/13/2022] amLODIPine  5 mg Oral Daily    And    [START ON 3/13/2022] benazepriL  40 mg Oral Daily    aspirin  81 mg Oral Daily    dexamethasone  6 mg Intravenous Q4H    doxycycline  100 mg Oral Q12H    levETIRAcetam  500 mg Oral BID    metoprolol succinate  100 mg Oral Daily    multivitamin  1 tablet Oral Daily    pravastatin  20 mg Oral QHS    senna-docusate 8.6-50 mg  1 tablet Oral BID     PRN Meds:acetaminophen, albuterol-ipratropium, benzonatate, HYDROcodone-acetaminophen, HYDROmorphone, loperamide, melatonin, ondansetron, promethazine, sodium chloride 0.9%, sodium chloride 0.9%     Review of Systems  Objective:     Vital Signs (Most Recent):  Temp: 97.8 °F (36.6 °C) (03/12/22 0813)  Pulse: 83 (03/12/22 0813)  Resp: 17 (03/12/22 0813)  BP: 122/72 (03/12/22 0813)  SpO2: (!) 93 % (03/12/22 0813)   Vital Signs (24h Range):  Temp:  [97.5 °F (36.4 °C)-98.1 °F (36.7 °C)] 97.8 °F (36.6 °C)  Pulse:  [66-83] 83  Resp:  [17-18] 17  SpO2:  [91 %-96 %] 93 %  BP: (122-144)/(72-85) 122/72     Weight: 81.6 kg (179 lb 14.3 oz)  Body mass index is 26.57 kg/m².  Body surface area is 1.99 meters squared.      Intake/Output Summary (Last 24 hours) at 3/12/2022 1147  Last data filed at 3/12/2022 0500  Gross per 24 hour   Intake 290 ml   Output 800 ml   Net -510 ml       Physical Exam    Significant Labs:   CBC:   Recent Labs   Lab 03/11/22  0453 03/12/22  0506   WBC 28.75* 27.77*   HGB 11.9* 12.4*   HCT 35.8* 36.5*    381    and CMP:   Recent Labs   Lab 03/11/22  0453 03/12/22  0506   * 129*   K 4.2 4.5   CL 94* 92*   CO2 25 24   * 198*   BUN 32* 36*   CREATININE 0.5 0.6   CALCIUM 9.7 9.9   PROT 6.5 6.3   ALBUMIN 2.7* 2.8*   BILITOT 0.8 0.8   ALKPHOS 99 99   AST 33 34   ALT 27 28   ANIONGAP 12 13    EGFRNONAA >60.0 >60.0       Diagnostic Results:  None

## 2022-03-12 NOTE — SUBJECTIVE & OBJECTIVE
Interval History:     Review of Systems  Objective:     Vital Signs (Most Recent):  Temp: 97.8 °F (36.6 °C) (03/12/22 0813)  Pulse: 83 (03/12/22 0813)  Resp: 17 (03/12/22 0813)  BP: 122/72 (03/12/22 0813)  SpO2: (!) 93 % (03/12/22 0813)   Vital Signs (24h Range):  Temp:  [97.5 °F (36.4 °C)-98.1 °F (36.7 °C)] 97.8 °F (36.6 °C)  Pulse:  [66-83] 83  Resp:  [17-18] 17  SpO2:  [91 %-96 %] 93 %  BP: (122-144)/(72-85) 122/72     Weight: 81.6 kg (179 lb 14.3 oz)  Body mass index is 26.57 kg/m².    Intake/Output Summary (Last 24 hours) at 3/12/2022 1115  Last data filed at 3/12/2022 0500  Gross per 24 hour   Intake 290 ml   Output 800 ml   Net -510 ml      Physical Exam  Constitutional:       General: He is not in acute distress.     Appearance: He is well-developed.   HENT:      Head: Normocephalic.   Eyes:      Pupils: Pupils are equal, round, and reactive to light.   Cardiovascular:      Rate and Rhythm: Normal rate and regular rhythm.   Pulmonary:      Effort: No respiratory distress.   Abdominal:      General: There is no distension.      Tenderness: There is no abdominal tenderness.   Musculoskeletal:      Cervical back: Neck supple.   Skin:     Findings: No rash.   Neurological:      Mental Status: He is alert and oriented to person, place, and time.   Psychiatric:         Mood and Affect: Mood normal.       Significant Labs: All pertinent labs within the past 24 hours have been reviewed.  BMP:   Recent Labs   Lab 03/12/22  0506   *   *   K 4.5   CL 92*   CO2 24   BUN 36*   CREATININE 0.6   CALCIUM 9.9     CBC:   Recent Labs   Lab 03/11/22 0453 03/12/22  0506   WBC 28.75* 27.77*   HGB 11.9* 12.4*   HCT 35.8* 36.5*    381     CMP:   Recent Labs   Lab 03/11/22 0453 03/12/22  0506   * 129*   K 4.2 4.5   CL 94* 92*   CO2 25 24   * 198*   BUN 32* 36*   CREATININE 0.5 0.6   CALCIUM 9.7 9.9   PROT 6.5 6.3   ALBUMIN 2.7* 2.8*   BILITOT 0.8 0.8   ALKPHOS 99 99   AST 33 34   ALT 27 28    ANIONGAP 12 13   EGFRNONAA >60.0 >60.0       Significant Imaging: I have reviewed all pertinent imaging results/findings within the past 24 hours.

## 2022-03-12 NOTE — ASSESSMENT & PLAN NOTE
No new issues today.  Patient moving towards hospice approach which appears most appropriate.  Continue comfort measures.

## 2022-03-12 NOTE — PLAN OF CARE
03/11/22 2025   Patient Assessment/Suction   Level of Consciousness (AVPU) alert   Respiratory Effort Normal;Unlabored   Expansion/Accessory Muscles/Retractions no use of accessory muscles   All Lung Fields Breath Sounds Anterior:;clear;diminished   Rhythm/Pattern, Respiratory unlabored   Cough Frequency infrequent   PRE-TX-O2   O2 Device (Oxygen Therapy) nasal cannula   $ Is the patient on Low Flow Oxygen? Yes   Flow (L/min) 3   SpO2 (!) 94 %   Pulse Oximetry Type Intermittent   $ Pulse Oximetry - Multiple Charge Pulse Oximetry - Multiple   Pulse 78   Resp 18   Aerosol Therapy   $ Aerosol Therapy Charges PRN treatment not required   Education   $ Education DME Oxygen;15 min   Respiratory Evaluation   $ Care Plan Tech Time 15 min
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
## 2022-03-12 NOTE — CARE UPDATE
following up on hospice this date. Yaneth from [facility] ([phone]) saw pt yesterday, and has spoken with his wife and daughter. Pt does not meet criteria for their inpatient unit at this time. However, she states pt's daughter was going to speak once more with her mother about taking pt home.  spoke with pt's daughter Olena ([phone]), who was currently driving back from [place] to [place]. She states she has not yet been able to speak with her mother, but they are planning to get friends, family, Judaism members together to assist with pt's care at home. Discussed sitter services as well, and Olena desires information. Discussed nursing home placement as an option that could be started if desired, and Olena did not wish to pursue further at this time (hospice social worker can assist with this if desired in the future).  to follow up tomorrow.

## 2022-03-12 NOTE — PT/OT/SLP PROGRESS
Physical Therapy Treatment    Patient Name:  Kalen Evans   MRN:  [id number]    Recommendations:     Discharge Recommendations:  hospice facility (wife will not be able to manage him at home without 24/7 assistance)   Discharge Equipment Recommendations:  (to be determined at next level of care)   Barriers to discharge: Decreased caregiver support    Assessment:     Kalen Evans is a 76 y.o. male admitted with a medical diagnosis of Hyponatremia.  He presents with the following impairments/functional limitations:  weakness, impaired functional mobilty, decreased safety awareness, impaired endurance, gait instability, pain, impaired self care skills, decreased upper extremity function, orthopedic precautions, impaired skin Pt requesting OOB. He requires max A x 2 for bed mobility, mod A x 2 for SPT to BSC. Toileting attended x 10 minutes with patient requiring additional time.VC's given to avoid Valsalva maneuver. Spouse present and remaining with patient until floor staff able to assist with cleaning.Pt with poor medical prognosis. Will need additional support to manage him as illness progresses.    Rehab Prognosis: Poor; patient would benefit from acute skilled PT services to address these deficits and reach maximum level of function and reuce burden of care.    Recent Surgery: Procedure(s) (LRB):  ORIF, FRACTURE, HUMERUS (Right) 5 Days Post-Op    Plan:     During this hospitalization, patient to be seen 5 x/week (remain for education, fammily support until long term POC established) to address the identified rehab impairments via therapeutic activities, therapeutic exercises and progress toward the following goals:    · Plan of Care Expires:  04/04/22    Subjective     Chief Complaint: weakness. fatigue  Patient/Family Comments/goals: transfer with decreased assistance, spouse will not be able to manage patient at current level  Pain/Comfort:  · Pain Rating 1: 4/10  · Location - Side 1: Left  · Location 1:  arm  · Pain Addressed 1: Distraction  · Pain Rating Post-Intervention 1: 4/10      Objective:     Communicated with nurse prior to session.  Patient found supine with peripheral IV, oxygen upon PT entry to room.     General Precautions: Standard, fall   Orthopedic Precautions:LUE non weight bearing (arm to remain sling and swath)   Braces: UE Sling  Respiratory Status: Nasal cannula, flow 3 L/min     Functional Mobility:  · Bed Mobility:     · Supine to Sit: maximal assistance and of 2 persons  · Transfers:     · Sit to Stand:  moderate assistance, maximal assistance and of 2 persons with hand-held assist  · Toilet Transfer: moderate assistance and maximal assistance with  no AD  using  Stand Pivot      AM-PAC 6 CLICK MOBILITY  Turning over in bed (including adjusting bedclothes, sheets and blankets)?: 2  Sitting down on and standing up from a chair with arms (e.g., wheelchair, bedside commode, etc.): 2  Moving from lying on back to sitting on the side of the bed?: 2  Moving to and from a bed to a chair (including a wheelchair)?: 2  Need to walk in hospital room?: 1  Climbing 3-5 steps with a railing?: 1  Basic Mobility Total Score: 10       Therapeutic Activities and Exercises:   Pt and spouse educated on use of call bed, safety    Patient left on BSC with all lines intact, call button in reach, floor staff notified and spouse present..    GOALS:   Multidisciplinary Problems     Physical Therapy Goals        Problem: Physical Therapy Goal    Goal Priority Disciplines Outcome Goal Variances Interventions   Physical Therapy Goal     PT, PT/OT      Description: Goals to be met by: D/C    Patient will increase functional independence with mobility by performin. Supine to sit with Maximum Assistance  2. Bed to chair transfer with Maximum Assistance using No Assistive Device                     Time Tracking:     PT Received On: 22  PT Start Time: 1104     PT Stop Time: 1123  PT Total Time (min): 19 min      Billable Minutes: Therapeutic Activity 19    Treatment Type: Treatment  PT/PTA: PT           03/12/2022

## 2022-03-12 NOTE — PROGRESS NOTES
[facility]  Hematology/Oncology  Progress Note    Patient Name: Kalen Evans  Admission Date: 3/4/2022  Hospital Length of Stay: 8 days  Code Status: Full Code     Subjective:     HPI:  Patient resting comfortably.  No new issues currently.        GEN: no apparent distress, comfortable  HEAD: atraumatic and normocephalic  EYES: no pallor,   ENT: external ears WNL; no nasal discharge  NECK: no visible masses, trachea midline  CHEST: Normal respiratory effort  ABDOM: Visibly Flat  SKIN: no visible rashes            Assessment/Plan:     Neuroendocrine carcinoma of lung  No new issues today.  Patient moving towards hospice approach which appears most appropriate.  Continue comfort measures.          Thank you for your consult. I will follow-up with patient. Please contact us if you have any additional questions.     Clovis Mcconnell MD  Hematology/Oncology  [facility]

## 2022-03-12 NOTE — CARE UPDATE
03/12/22 0752   Patient Assessment/Suction   Level of Consciousness (AVPU) alert   Respiratory Effort Normal;Unlabored   Expansion/Accessory Muscles/Retractions no use of accessory muscles;no retractions;expansion symmetric   All Lung Fields Breath Sounds clear   Rhythm/Pattern, Respiratory unlabored;pattern regular;depth regular;chest wiggle adequate;no shortness of breath reported   Cough Frequency no cough   PRE-TX-O2   O2 Device (Oxygen Therapy) nasal cannula   $ Is the patient on Low Flow Oxygen? Yes   Flow (L/min) 3   Pulse Oximetry Type Intermittent   $ Pulse Oximetry - Multiple Charge Pulse Oximetry - Multiple   Pulse 79   Resp 18   Aerosol Therapy   $ Aerosol Therapy Charges PRN treatment not required   Education   $ Education Bronchodilator;15 min   Respiratory Evaluation   $ Care Plan Tech Time 15 min

## 2022-03-12 NOTE — PROGRESS NOTES
[facility] Medicine  Progress Note    Patient Name: Kalen Evans  MRN: [id number]  Patient Class: IP- Inpatient   Admission Date: 3/4/2022  Length of Stay: 8 days  Attending Physician: Kwesi Peña MD  Primary Care Provider: Phuc Pelletier MD        Subjective:     Principal Problem:Hyponatremia        HPI:  No notes on file    Overview/Hospital Course:  Patient was seen and examined along with Dr. Epps  He is awake alert and oriented.  Discussed in detail about his brain lesions possible metastasis.  He want to continue the treatment    3/6  Slight drowsy   But patient is very intelligent and can answer the questions appropriately  Discussed with possible side effects with memory loss status post radiation and he is aware of that    3/7  Saw prior to OR   No CP or SOB   WBC increasing . From steroid   NA more less the same , chronic     3/8  Seen earlier   No CP or SOB   Na normalized   Will DC if surgeon clear today     3/9  Pt is much more drowsier than before   Wife bedside   Plan d/w pt and wife . Patient is confused   Wife made decision to speak with hospice care and consulted     3/10  Drowsy   Response appropriately   Hospice placement in progress.    3/11  More awake today   No CP or SOB   Still awaited for hospice . No bed space for hospice placement     3/12   More awake   Not eating well   He appear dry       Interval History:     Review of Systems  Objective:     Vital Signs (Most Recent):  Temp: 97.8 °F (36.6 °C) (03/12/22 0813)  Pulse: 83 (03/12/22 0813)  Resp: 17 (03/12/22 0813)  BP: 122/72 (03/12/22 0813)  SpO2: (!) 93 % (03/12/22 0813)   Vital Signs (24h Range):  Temp:  [97.5 °F (36.4 °C)-98.1 °F (36.7 °C)] 97.8 °F (36.6 °C)  Pulse:  [66-83] 83  Resp:  [17-18] 17  SpO2:  [91 %-96 %] 93 %  BP: (122-144)/(72-85) 122/72     Weight: 81.6 kg (179 lb 14.3 oz)  Body mass index is 26.57 kg/m².    Intake/Output Summary (Last 24 hours) at 3/12/2022 1115  Last data filed at  3/12/2022 0500  Gross per 24 hour   Intake 290 ml   Output 800 ml   Net -510 ml      Physical Exam  Constitutional:       General: He is not in acute distress.     Appearance: He is well-developed.   HENT:      Head: Normocephalic.   Eyes:      Pupils: Pupils are equal, round, and reactive to light.   Cardiovascular:      Rate and Rhythm: Normal rate and regular rhythm.   Pulmonary:      Effort: No respiratory distress.   Abdominal:      General: There is no distension.      Tenderness: There is no abdominal tenderness.   Musculoskeletal:      Cervical back: Neck supple.   Skin:     Findings: No rash.   Neurological:      Mental Status: He is alert and oriented to person, place, and time.   Psychiatric:         Mood and Affect: Mood normal.       Significant Labs: All pertinent labs within the past 24 hours have been reviewed.  BMP:   Recent Labs   Lab 03/12/22  0506   *   *   K 4.5   CL 92*   CO2 24   BUN 36*   CREATININE 0.6   CALCIUM 9.9     CBC:   Recent Labs   Lab 03/11/22  0453 03/12/22  0506   WBC 28.75* 27.77*   HGB 11.9* 12.4*   HCT 35.8* 36.5*    381     CMP:   Recent Labs   Lab 03/11/22  0453 03/12/22  0506   * 129*   K 4.2 4.5   CL 94* 92*   CO2 25 24   * 198*   BUN 32* 36*   CREATININE 0.5 0.6   CALCIUM 9.7 9.9   PROT 6.5 6.3   ALBUMIN 2.7* 2.8*   BILITOT 0.8 0.8   ALKPHOS 99 99   AST 33 34   ALT 27 28   ANIONGAP 12 13   EGFRNONAA >60.0 >60.0       Significant Imaging: I have reviewed all pertinent imaging results/findings within the past 24 hours.      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 Patient need placement too   OP  for whole brain radiation if patient not going  hospice care .  Patient need to sit up   Continue to monitor hyponatremia.    On  decadron and DC with decadron   -seizure precautions   Recommended by oncologist  to hold keytruda at this time .  D/w Dr Allison and he cleared for discharge   DC when hospice ready   Start clinimix      VTE Risk Mitigation (From admission, onward)         Ordered     IP VTE LOW RISK PATIENT  Once         03/04/22 1321     Place sequential compression device  Until discontinued         03/04/22 1321                Discharge Planning   MEENA: 3/10/2022     Code Status: Full Code   Is the patient medically ready for discharge?:     Reason for patient still in hospital (select all that apply): Treatment  Discharge Plan A: Hospice/home                  Kwesi Peña MD  Department of Hospital Medicine   [facility]

## 2022-03-13 NOTE — CARE UPDATE
03/13/22 0750   Patient Assessment/Suction   Level of Consciousness (AVPU) alert   Respiratory Effort Unlabored;Normal   Expansion/Accessory Muscles/Retractions no use of accessory muscles;no retractions;expansion symmetric   All Lung Fields Breath Sounds clear   Rhythm/Pattern, Respiratory unlabored;pattern regular;depth regular;chest wiggle adequate;no shortness of breath reported   Cough Frequency no cough   PRE-TX-O2   O2 Device (Oxygen Therapy) nasal cannula   $ Is the patient on Low Flow Oxygen? Yes   Flow (L/min) 2   SpO2 95 %   Pulse Oximetry Type Intermittent   $ Pulse Oximetry - Multiple Charge Pulse Oximetry - Multiple   Pulse 85   Resp 17   Aerosol Therapy   $ Aerosol Therapy Charges PRN treatment not required   Education   $ Education Bronchodilator;15 min   Respiratory Evaluation   $ Care Plan Tech Time 15 min

## 2022-03-13 NOTE — PLAN OF CARE
Problem: Adult Inpatient Plan of Care  Goal: Plan of Care Review  Outcome: Ongoing, Progressing  Goal: Patient-Specific Goal (Individualized)  Outcome: Ongoing, Progressing  Goal: Absence of Hospital-Acquired Illness or Injury  Outcome: Ongoing, Progressing  Goal: Optimal Comfort and Wellbeing  Outcome: Ongoing, Progressing  Goal: Readiness for Transition of Care  Outcome: Ongoing, Progressing     Problem: Skin Injury Risk Increased  Goal: Skin Health and Integrity  Outcome: Ongoing, Progressing     Problem: Fall Injury Risk  Goal: Absence of Fall and Fall-Related Injury  Outcome: Ongoing, Progressing     Problem: Impaired Wound Healing  Goal: Optimal Wound Healing  Outcome: Ongoing, Progressing

## 2022-03-13 NOTE — PROGRESS NOTES
[facility] Medicine  Progress Note    Patient Name: Kalen Evans  MRN: [id number]  Patient Class: IP- Inpatient   Admission Date: 3/4/2022  Length of Stay: 9 days  Attending Physician: Kwesi Peña MD  Primary Care Provider: Phuc Pelletier MD        Subjective:     Principal Problem:Hyponatremia        HPI:  No notes on file    Overview/Hospital Course:  Patient was seen and examined along with Dr. Epps  He is awake alert and oriented.  Discussed in detail about his brain lesions possible metastasis.  He want to continue the treatment    3/6  Slight drowsy   But patient is very intelligent and can answer the questions appropriately  Discussed with possible side effects with memory loss status post radiation and he is aware of that    3/7  Saw prior to OR   No CP or SOB   WBC increasing . From steroid   NA more less the same , chronic     3/8  Seen earlier   No CP or SOB   Na normalized   Will DC if surgeon clear today     3/9  Pt is much more drowsier than before   Wife bedside   Plan d/w pt and wife . Patient is confused   Wife made decision to speak with hospice care and consulted     3/10  Drowsy   Response appropriately   Hospice placement in progress.    3/11  More awake today   No CP or SOB   Still awaited for hospice . No bed space for hospice placement     3/12   More awake   Not eating well   He appear dry     3/13  D/w family in the room   Patient is more awake and alert         Interval History:     Review of Systems  Objective:     Vital Signs (Most Recent):  Temp: 97.6 °F (36.4 °C) (03/13/22 1032)  Pulse: 71 (03/13/22 1032)  Resp: 19 (03/13/22 1032)  BP: (!) 145/81 (03/13/22 1032)  SpO2: 96 % (03/13/22 1032)   Vital Signs (24h Range):  Temp:  [97 °F (36.1 °C)-98 °F (36.7 °C)] 97.6 °F (36.4 °C)  Pulse:  [67-86] 71  Resp:  [17-19] 19  SpO2:  [90 %-98 %] 96 %  BP: (135-167)/(70-81) 145/81     Weight: 81.6 kg (179 lb 14.3 oz)  Body mass index is 26.57 kg/m².    Intake/Output  Summary (Last 24 hours) at 3/13/2022 1545  Last data filed at 3/13/2022 1458  Gross per 24 hour   Intake 100 ml   Output 1925 ml   Net -1825 ml      Physical Exam  Constitutional:       General: He is not in acute distress.     Appearance: He is well-developed.   HENT:      Head: Normocephalic.   Eyes:      Pupils: Pupils are equal, round, and reactive to light.   Cardiovascular:      Rate and Rhythm: Normal rate and regular rhythm.   Pulmonary:      Effort: No respiratory distress.   Abdominal:      General: Abdomen is flat. There is no distension.      Tenderness: There is no abdominal tenderness.   Musculoskeletal:      Cervical back: Neck supple.   Skin:     Findings: No rash.   Neurological:      Mental Status: He is alert and oriented to person, place, and time.   Psychiatric:         Mood and Affect: Mood normal.       Significant Labs: All pertinent labs within the past 24 hours have been reviewed.  CBC:   Recent Labs   Lab 03/12/22  0506   WBC 27.77*   HGB 12.4*   HCT 36.5*        CMP:   Recent Labs   Lab 03/12/22  0506   *   K 4.5   CL 92*   CO2 24   *   BUN 36*   CREATININE 0.6   CALCIUM 9.9   PROT 6.3   ALBUMIN 2.8*   BILITOT 0.8   ALKPHOS 99   AST 34   ALT 28   ANIONGAP 13   EGFRNONAA >60.0     Cardiac Markers: No results for input(s): CKMB, MYOGLOBIN, BNP, TROPISTAT in the last 48 hours.    Significant Imaging: I have reviewed all pertinent imaging results/findings within the past 24 hours.      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 Patient need placement too   OP  for whole brain radiation if patient not going hospice care .  Patient need to sit up   Continue to monitor  hyponatremia.    On  decadron and DC with decadron   -seizure precautions   Recommended by oncologist  to hold keytruda at this time .  D/w Dr Allison and he cleared for discharge   DC when hospice ready   clinimix   VTE Risk Mitigation (From admission, onward)         Ordered     IP VTE LOW RISK PATIENT  Once         03/04/22 1321     Place sequential compression device  Until discontinued         03/04/22 1321                Discharge Planning   MEENA: 3/10/2022     Code Status: Full Code   Is the patient medically ready for discharge?:     Reason for patient still in hospital (select all that apply): Treatment  Discharge Plan A: (P) Hospice/home                  Kwesi Peña MD  Department of Hospital Medicine   [facility]

## 2022-03-13 NOTE — NURSING
Bedside report given. No patient complaints at this time. Patient at baseline orientation and safety intact. VSS. Bed alarm on and working.

## 2022-03-13 NOTE — SUBJECTIVE & OBJECTIVE
Interval History:     Review of Systems  Objective:     Vital Signs (Most Recent):  Temp: 97.6 °F (36.4 °C) (03/13/22 1032)  Pulse: 71 (03/13/22 1032)  Resp: 19 (03/13/22 1032)  BP: (!) 145/81 (03/13/22 1032)  SpO2: 96 % (03/13/22 1032)   Vital Signs (24h Range):  Temp:  [97 °F (36.1 °C)-98 °F (36.7 °C)] 97.6 °F (36.4 °C)  Pulse:  [67-86] 71  Resp:  [17-19] 19  SpO2:  [90 %-98 %] 96 %  BP: (135-167)/(70-81) 145/81     Weight: 81.6 kg (179 lb 14.3 oz)  Body mass index is 26.57 kg/m².    Intake/Output Summary (Last 24 hours) at 3/13/2022 1545  Last data filed at 3/13/2022 1458  Gross per 24 hour   Intake 100 ml   Output 1925 ml   Net -1825 ml      Physical Exam  Constitutional:       General: He is not in acute distress.     Appearance: He is well-developed.   HENT:      Head: Normocephalic.   Eyes:      Pupils: Pupils are equal, round, and reactive to light.   Cardiovascular:      Rate and Rhythm: Normal rate and regular rhythm.   Pulmonary:      Effort: No respiratory distress.   Abdominal:      General: Abdomen is flat. There is no distension.      Tenderness: There is no abdominal tenderness.   Musculoskeletal:      Cervical back: Neck supple.   Skin:     Findings: No rash.   Neurological:      Mental Status: He is alert and oriented to person, place, and time.   Psychiatric:         Mood and Affect: Mood normal.       Significant Labs: All pertinent labs within the past 24 hours have been reviewed.  CBC:   Recent Labs   Lab 03/12/22  0506   WBC 27.77*   HGB 12.4*   HCT 36.5*        CMP:   Recent Labs   Lab 03/12/22  0506   *   K 4.5   CL 92*   CO2 24   *   BUN 36*   CREATININE 0.6   CALCIUM 9.9   PROT 6.3   ALBUMIN 2.8*   BILITOT 0.8   ALKPHOS 99   AST 34   ALT 28   ANIONGAP 13   EGFRNONAA >60.0     Cardiac Markers: No results for input(s): CKMB, MYOGLOBIN, BNP, TROPISTAT in the last 48 hours.    Significant Imaging: I have reviewed all pertinent imaging results/findings within the past 24  hours.

## 2022-03-13 NOTE — PLAN OF CARE
03/12/22 2020   Patient Assessment/Suction   Level of Consciousness (AVPU) alert   Respiratory Effort Normal;Unlabored   Expansion/Accessory Muscles/Retractions no use of accessory muscles   All Lung Fields Breath Sounds Anterior:;clear;diminished   Rhythm/Pattern, Respiratory unlabored   Cough Frequency infrequent   PRE-TX-O2   O2 Device (Oxygen Therapy) nasal cannula   $ Is the patient on Low Flow Oxygen? Yes   Flow (L/min) 3   SpO2 97 %   Pulse Oximetry Type Intermittent   $ Pulse Oximetry - Multiple Charge Pulse Oximetry - Multiple   Pulse 68   Resp 18   Aerosol Therapy   $ Aerosol Therapy Charges PRN treatment not required   Education   $ Education DME Oxygen;15 min   Respiratory Evaluation   $ Care Plan Tech Time 15 min

## 2022-03-14 NOTE — PT/OT/SLP PROGRESS
"Physical Therapy      Patient Name:  Kalen Evans   MRN:  [id number]    Patient not seen today secondary to Patient ill (Comment)  " I just don't feel good". Will follow-up 3/15/2022.        "

## 2022-03-14 NOTE — PLAN OF CARE
Important Message from Medicare was verbally explained and given to patient/caregiver on 03/14/2022 at 8:45am    Spoke to patient at bedside patient is unable to sign due to illness     addressed any questions or concerns.    Important Message from Medicare document will be scanned into patient's medical record

## 2022-03-14 NOTE — CARE UPDATE
spoke with Addie at [facility], DME being delivered this morning. Hospice will notify  when all equipment is in place at patients home.  contacted patients spouse, Noemy. Debbie (daughter) will be home to let DME company in and spouse will come to the hospital to support patient.

## 2022-03-14 NOTE — PLAN OF CARE
Problem: Adult Inpatient Plan of Care  Goal: Plan of Care Review  Outcome: Ongoing, Progressing  Goal: Patient-Specific Goal (Individualized)  Outcome: Ongoing, Progressing  Goal: Optimal Comfort and Wellbeing  Outcome: Ongoing, Progressing  Goal: Readiness for Transition of Care  Outcome: Ongoing, Progressing     Problem: Fall Injury Risk  Goal: Absence of Fall and Fall-Related Injury  Outcome: Ongoing, Progressing

## 2022-03-14 NOTE — NURSING
Pt discharged to home with hospice at this time via ambulance transport- a&o x4, no distress to note. Pt wife is at bedside for discharge teaching, and paperwork provided at this time. Verbalizes understanding of d/c instructions

## 2022-03-14 NOTE — PLAN OF CARE
Chart and discharge orders reviewed.  Patient discharging home with [facility].   waiting for [facility] ambulance auth. Hospice has delivered all DME. No further case management needs noted.    [id number]       03/14/22 1403   Final Note   Assessment Type Final Discharge Note   Anticipated Discharge Disposition HospiceHome   What phone number can be called within the next 1-3 days to see how you are doing after discharge? [phone]   Post-Acute Status   Hospice Status Set-up Complete/Auth obtained   Discharge Delays (!) Other

## 2022-03-14 NOTE — PLAN OF CARE
03/13/22 1940   Patient Assessment/Suction   Level of Consciousness (AVPU) alert   Respiratory Effort Normal;Unlabored   Expansion/Accessory Muscles/Retractions no use of accessory muscles   All Lung Fields Breath Sounds Anterior:   Rhythm/Pattern, Respiratory unlabored   PRE-TX-O2   O2 Device (Oxygen Therapy) nasal cannula   $ Is the patient on Low Flow Oxygen? Yes   Flow (L/min) 3   SpO2 96 %   Pulse Oximetry Type Intermittent   $ Pulse Oximetry - Multiple Charge Pulse Oximetry - Multiple   Pulse 74   Resp 20   Aerosol Therapy   $ Aerosol Therapy Charges PRN treatment not required   Education   $ Education DME Oxygen;15 min   Respiratory Evaluation   $ Care Plan Tech Time 15 min

## 2022-03-14 NOTE — DISCHARGE SUMMARY
[facility] Medicine  Discharge Summary      Patient Name: Kalen Evans  MRN: [id number]  Patient Class: IP- Inpatient  Admission Date: 3/4/2022  Hospital Length of Stay: 10 days  Discharge Date and Time:  03/14/2022 4:24 PM  Attending Physician: No att. providers found   Discharging Provider: Kwesi Peña MD  Primary Care Provider: Phuc Pelletier MD      HPI:   No notes on file    Procedure(s) (LRB):  ORIF, FRACTURE, HUMERUS (Right)      Hospital Course:     76 yr old male known hx of Neuroendocrine cancer,Lung Cancer hyperlipidemia, HTN, prior CABG presents to ED with  Recurrent Mult falls and profound weakness     The patient states he fell yesterday and actually was seen at our facility he was found to have a possible pathological proximal right humerus fracture.  He was seen and splint in ED with followups follow up Orthopedics outpatient basis.  However this morning he sustained another fall.  He denied any head trauma no visual changes no unilateral weakness.     The patient had the 3 weeks ago right humerus pathological fracture and had plastic splint and sling with a swath.  He was supposed to follow-up with orthopedics but was unable to get an appointment present.  He was complaining that the brace/splint was too tight.  Yesterday 82 of unable to get an appointment for 3 weeks out the patient also had pathological rib fractures and was trying to see and oncology orthopedic specialist     Nevertheless in the emergency room he was found to be hyponatremic he was started on IV fluids orthopedics and heme oncology works consulted    Hospital course  Patient was admitted with lung cancer metastasis to the brain and also pathological fracture of the right arm.  ORIF was done by the orthopedic surgeon and uneventful.  Patient was started on Decadron to decrease brain swelling .  Patient was seen by the oncologist and was planning to give brain irradiation but later patient quickly  declined and patient and family daughter decided to go for hospice and discharged with home hospice.  Continued Decadron at discharge.           Goals of Care Treatment Preferences:  Code Status: Full Code      Consults:   Consults (From admission, onward)        Status Ordering Provider     Inpatient consult to Social Work/Case Management  Once        Provider:  (Not yet assigned)    LEATHA Cassidy     Inpatient consult to Radiation Oncology  Once        Provider:  Dylon Lindo Jr., MD    Completed CHEPE RUTLEDGE     Inpatient consult to Hematology Oncology  Once        Provider:  Chepe Rutledge MD    Acknowledged ABDIEL ELLISON     Inpatient consult to Hospitalist  Once        Provider:  Soco Brady NP    Acknowledged ABDIEL ELLISON          No new Assessment & Plan notes have been filed under this hospital service since the last note was generated.  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    Specialties: Hematology, Oncology, Hematology and Oncology  Why: As needed  Contact information:  [address]  [address]  [place] [address]  [phone]                   Contact information for after-discharge care     Home Medical Care     [facility] .     Service: Home Health Services  Contact information:  [address]  [place] [address]  [phone]           [facility] HOSP[facility], .    Service: Home Hospice  Contact information:  [address]  [facility] [address]  [phone]                           Patient Instructions:      Diet Adult Regular     Activity as tolerated       Significant Diagnostic Studies: Labs: CMP No results for input(s): NA, K, CL, CO2, GLU, BUN, CREATININE, CALCIUM, PROT, ALBUMIN, BILITOT, ALKPHOS, AST, ALT, ANIONGAP, ESTGFRAFRICA, EGFRNONAA in the last 48 hours. and CBC No results for input(s): WBC, HGB, HCT, PLT in the last 48 hours.    Pending Diagnostic Studies:     None         Medications:  Reconciled Home Medications:      Medication List      START taking these medications    amLODIPine 5 MG tablet  Commonly known as: NORVASC  Take 1 tablet (5 mg total) by mouth once daily.  Start taking on: March 15, 2022     dexAMETHasone 6 MG tablet  Commonly known as: DECADRON  Take 1 tablet (6 mg total) by mouth every 6 (six) hours. for 23 days        CHANGE how you take these medications    fluticasone propionate 50 mcg/actuation nasal spray  Commonly known as: FLONASE  USE 2 SPRAYS INTO EACH NOSTRIL AS NEEDED  What changed: See the new instructions.        CONTINUE taking these medications    benzonatate 100 MG capsule  Commonly known as: TESSALON PERLES  Take 1 capsule (100 mg total) by mouth 3 (three) times daily as needed for Cough.     HYDROcodone-acetaminophen 5-325 mg per tablet  Commonly known as: NORCO  Take 1 tablet by mouth every 4 (four) hours as needed for Pain.     multivitamin capsule  Take 1 capsule by mouth once daily.     ondansetron 8 MG tablet  Commonly known as: ZOFRAN  Take 1 tablet (8 mg total) by mouth every 8 (eight) hours as needed.     oxyCODONE-acetaminophen 5-325 mg per tablet  Commonly known as: PERCOCET  Take 1 tablet by mouth every 6  (six) hours as needed for Pain.     pravastatin 20 MG tablet  Commonly known as: PRAVACHOL  Take 1 tablet (20 mg total) by mouth once daily.     promethazine 25 MG tablet  Commonly known as: PHENERGAN  Take 1 tablet (25 mg total) by mouth every 4 to 6 hours as needed.        STOP taking these medications    amLODIPine-benazepriL 5-40 mg per capsule  Commonly known as: LOTREL     aspirin 81 MG EC tablet  Commonly known as: ECOTRIN     doxycycline 100 MG Cap  Commonly known as: VIBRAMYCIN     glucosamine-chondroitin 250-200 mg Tab     hydrocortisone 25 mg suppository  Commonly known as: ANUSOL-HC     metoprolol succinate 100 MG 24 hr tablet  Commonly known as: TOPROL-XL     tadalafiL 5 MG tablet  Commonly known as: CIALIS     ZENPEP 40,000-126,000- 168,000 unit Cpdr  Generic drug: lipase-protease-amylase            Indwelling Lines/Drains at time of discharge:   Lines/Drains/Airways     None               General: Patient resting comfortably in no acute distress. Appears as stated age. Calm  Eyes: EOM intact. No conjunctivae injection. No scleral icterus.  ENT: Hearing grossly intact. No discharge from ears. No nasal discharge.   CVS: RRR. No LE edema BL.  Lungs: CTA BL, no wheezing or crackles. Good breath sounds. No accessory muscle use. No acute respiratory distress  Neuro: non focal , Follows commands. Responds appropriately  Time spent on the discharge of patient: 34 minutes         Kwesi Peña MD  Department of Hospital Medicine  [facility]

## 2022-03-21 ENCOUNTER — TELEPHONE (OUTPATIENT)
Dept: INFUSION THERAPY | Facility: HOSPITAL | Age: 77
End: 2022-03-21
Payer: MEDICARE

## 2022-03-21 NOTE — TELEPHONE ENCOUNTER
LM for patient on voicemail regarding need for labs prior to infusion tommorow. Call back number given.

## 2022-03-22 ENCOUNTER — TELEPHONE (OUTPATIENT)
Dept: INFUSION THERAPY | Facility: HOSPITAL | Age: 77
End: 2022-03-22
Payer: MEDICARE

## 2022-03-22 NOTE — TELEPHONE ENCOUNTER
Notified patient to move apt time from 1500 to 1400, spoke with patient wife and was informed patient passed away on Sunday. Charge nurse MARK Yousif RN informed as well as scheduling.

## (undated) DEVICE — SUTURE VICRYL 1 CT-1 27 J261H

## (undated) DEVICE — Device

## (undated) DEVICE — PENCIL BOVIE E2515H

## (undated) DEVICE — DRESSING ADAPTIC 3X8 2015

## (undated) DEVICE — STAPLER SKIN NON ROTATE PXW35

## (undated) DEVICE — BANDAGE ACE STERILE 4 REB3114

## (undated) DEVICE — TRAY SKIN PREP DRY

## (undated) DEVICE — STOCKINETTE IMPERVIOUS 9X44

## (undated) DEVICE — PADDING CAST 4 STERILE 30-321

## (undated) DEVICE — SLING ARM  MEDIUM BLUE 79-84155

## (undated) DEVICE — SUTURE VICRYL 3-0 SH 27 VCP416H

## (undated) DEVICE — BANDAGE COBAN 4 TAN

## (undated) DEVICE — GLOVE BIOGEL PI   GOLD SIZE 8

## (undated) DEVICE — TRAY GENERAL SURGERY

## (undated) DEVICE — SUTURE VICRYL 0 CT-1 27 VCP260H

## (undated) DEVICE — DRAPE IMPERVIOUS SPLIT 89331

## (undated) DEVICE — PACK SHOULDER 88491

## (undated) DEVICE — PAD BOVIE ADULT

## (undated) DEVICE — SUTURE VICRYL 2-0 CT-1 36 VCP945H

## (undated) DEVICE — DRILL BIT

## (undated) DEVICE — DRAPE U-SLOT 1019

## (undated) DEVICE — SOLUTION IRRI NS BOTTLE 1000ML R5200-01

## (undated) DEVICE — SPONGE GAUZE 10S 4X4  442214

## (undated) DEVICE — DRAPE C-ARM (FITS NEW C-ARM) 07-CA108